# Patient Record
Sex: FEMALE | Race: OTHER | HISPANIC OR LATINO | Employment: UNEMPLOYED | URBAN - METROPOLITAN AREA
[De-identification: names, ages, dates, MRNs, and addresses within clinical notes are randomized per-mention and may not be internally consistent; named-entity substitution may affect disease eponyms.]

---

## 2024-03-02 ENCOUNTER — HOSPITAL ENCOUNTER (EMERGENCY)
Facility: HOSPITAL | Age: 34
Discharge: HOME | End: 2024-03-02
Attending: EMERGENCY MEDICINE
Payer: MEDICAID

## 2024-03-02 VITALS
BODY MASS INDEX: 26.22 KG/M2 | WEIGHT: 148 LBS | SYSTOLIC BLOOD PRESSURE: 111 MMHG | OXYGEN SATURATION: 98 % | DIASTOLIC BLOOD PRESSURE: 76 MMHG | HEIGHT: 63 IN | TEMPERATURE: 98 F | RESPIRATION RATE: 16 BRPM | HEART RATE: 78 BPM

## 2024-03-02 DIAGNOSIS — Z34.91 FIRST TRIMESTER PREGNANCY: ICD-10-CM

## 2024-03-02 DIAGNOSIS — B96.89 BACTERIAL VAGINOSIS: Primary | ICD-10-CM

## 2024-03-02 DIAGNOSIS — N76.0 BACTERIAL VAGINOSIS: Primary | ICD-10-CM

## 2024-03-02 DIAGNOSIS — R10.2 PELVIC PAIN: ICD-10-CM

## 2024-03-02 LAB
BACTERIA #/AREA URNS AUTO: NORMAL /HPF
BACTERIA GENITAL QL WET PREP: ABNORMAL
BILIRUB UR QL STRIP: NEGATIVE
CLARITY UR REFRACT.AUTO: CLEAR
CLUE CELLS VAG QL WET PREP: ABNORMAL
COLOR UR AUTO: YELLOW
FILAMENT FUNGI VAG WET PREP-#/AREA: ABNORMAL
GLUCOSE UR QL STRIP: ABNORMAL
HGB UR QL STRIP: NEGATIVE
KETONES UR QL STRIP: ABNORMAL
LEUKOCYTE ESTERASE UR QL STRIP: ABNORMAL
MICROSCOPIC COMMENT: NORMAL
NITRITE UR QL STRIP: NEGATIVE
PH UR STRIP: 7 [PH] (ref 5–8)
PROT UR QL STRIP: NEGATIVE
RBC #/AREA URNS AUTO: 1 /HPF (ref 0–4)
SP GR UR STRIP: >1.03 (ref 1–1.03)
SPECIMEN SOURCE: ABNORMAL
SQUAMOUS #/AREA URNS AUTO: 3 /HPF
T VAGINALIS GENITAL QL WET PREP: ABNORMAL
URN SPEC COLLECT METH UR: ABNORMAL
WBC #/AREA URNS AUTO: 1 /HPF (ref 0–5)
WBC #/AREA VAG WET PREP: ABNORMAL
YEAST GENITAL QL WET PREP: ABNORMAL
YEAST UR QL AUTO: NORMAL

## 2024-03-02 PROCEDURE — 87491 CHLMYD TRACH DNA AMP PROBE: CPT

## 2024-03-02 PROCEDURE — 87210 SMEAR WET MOUNT SALINE/INK: CPT

## 2024-03-02 PROCEDURE — 81001 URINALYSIS AUTO W/SCOPE: CPT

## 2024-03-02 PROCEDURE — 99284 EMERGENCY DEPT VISIT MOD MDM: CPT

## 2024-03-02 RX ORDER — METRONIDAZOLE 7.5 MG/G
1 GEL VAGINAL 2 TIMES DAILY
Qty: 70 G | Refills: 0 | Status: SHIPPED | OUTPATIENT
Start: 2024-03-02 | End: 2024-03-07

## 2024-03-03 NOTE — ED PROVIDER NOTES
Encounter Date: 3/2/2024       History     Chief Complaint   Patient presents with    Vaginal Discharge     6 weeks preg, lower abd pain yellow discharge, pain with urination, [facility]d [facility]     33-year-old female  Monday 6wks gestation presents to the emergency department due to lower abdominal discomfort over the last few days.  She was previously evaluated for same presentation last week a [facility].  She had a ultrasound with a confirmed IUP.  Patient was presenting today however because the discomfort has continued it was also experiencing white vaginal discharge Denies history of STIs.  She was in a monogamous relationship No vaginal bleeding.  No changes in bowel movements.  No fever nausea vomiting.      Review of patient's allergies indicates:  No Known Allergies  Past Medical History:   Diagnosis Date    Diabetes mellitus      History reviewed. No pertinent surgical history.  History reviewed. No pertinent family history.  Social History     Tobacco Use    Smoking status: Unknown   Substance Use Topics    Drug use: Never     Review of Systems  See HPI  Physical Exam     Initial Vitals [24 1849]   BP Pulse Resp Temp SpO2   (!) 182/91 93 18 98.2 °F (36.8 °C) 100 %      MAP       --         Physical Exam    Vitals reviewed.  Constitutional: She appears well-developed and well-nourished.   Patient was well-appearing comfortable on exam   HENT:   Head: Normocephalic and atraumatic.   Eyes: Conjunctivae and EOM are normal.   Neck:   Normal range of motion.  Cardiovascular:  Normal rate.           Pulmonary/Chest: No respiratory distress.   Abdominal: Abdomen is soft. She exhibits no distension. There is no abdominal tenderness. There is no rebound.   Genitourinary:    Genitourinary Comments: Chaperoned   Exam performed no external lesions or rashes  No vaginal bleeding, there was copious vaginal white discharge present  No significant erythema of the vaginal  canal, cervical os closed, no adnexal tenderness.     Musculoskeletal:         General: Normal range of motion.      Cervical back: Normal range of motion.     Neurological: She is alert and oriented to person, place, and time.   Skin: Skin is warm and dry.   Psychiatric: She has a normal mood and affect. Thought content normal.         ED Course   Procedures  Labs Reviewed   VAGINAL SCREEN - Abnormal; Notable for the following components:       Result Value    Clue Cells Rare (*)     WBC - Vaginal Screen Moderate (*)     Bacteria - Vaginal Screen Many (*)     All other components within normal limits    Narrative:     Release to patient->Immediate   URINALYSIS, REFLEX TO URINE CULTURE - Abnormal; Notable for the following components:    Specific Gravity, UA >1.030 (*)     Glucose, UA 4+ (*)     Ketones, UA Trace (*)     Leukocytes, UA 1+ (*)     All other components within normal limits    Narrative:     Specimen Source->Urine   C. TRACHOMATIS/N. GONORRHOEAE BY AMP DNA   URINALYSIS MICROSCOPIC    Narrative:     Specimen Source->Urine          Imaging Results    None          Medications - No data to display  Medical Decision Making  33-year-old female presenting to emergency department for evaluation of lower abdominal pain and vaginal discharge.  She was a confirmed IUP no vaginal bleeding.  I performed a pelvic exam to rule out G/C bacterial vaginosis and Trichomonas.  Along with vaginal candidiasis.  Also ordered a UA.  No repeat imaging needed today as IUP has been confirmed at [facility] and patient was not experiencing any vaginal bleeding.  Labwork was remarkable for positive bacterial vaginosis prescribed topical metronidazole.  I have also placed a referral to obstetrician so patient can options establish care for this pregnancy.  In the presence of abdominal discomfort in the 1st trimester I explained the signs of miscarriage.  Discussed return precautions she verbalized understanding with plan discharged  home    Amount and/or Complexity of Data Reviewed  Labs: ordered.    Risk  Prescription drug management.                                      Clinical Impression:  Final diagnoses:  [N76.0, B96.89] Bacterial vaginosis (Primary)  [R10.2] Pelvic pain  [Z34.91] First trimester pregnancy          ED Disposition Condition    Discharge Stable          ED Prescriptions       Medication Sig Dispense Start Date End Date Auth. Provider    metroNIDAZOLE (METROGEL) 0.75 % (37.5mg/5 gram) vaginal gel Place 1 applicator vaginally 2 (two) times daily. for 5 days 70 g 3/2/2024 3/7/2024 Blanka Dumont PA-C          Follow-up Information       Follow up With Specialties Details Why Contact Info Additional Information    [facility] Obstetrics and Gynecology Schedule an appointment as soon as possible for a visit   [address]  [facility] [address] [phone] OB GYN - [facility], 5th Floor Please park in [address] and use [place] elevators    [facility] Obstetrics and Gynecology Schedule an appointment as soon as possible for a visit   [address] [address] [phone] Please park in [address] or [address] and use [address]ie [address]. Take Medical Office Bldg. elevators.             Blanka Dumont PA-C  03/04/24 [id number]

## 2024-03-03 NOTE — DISCHARGE INSTRUCTIONS
Call the clinic listed above to establish care with an obstetrician for appropriate labs and imaging during pregnancy.  They can also help manage her medications during pregnancy.  You can take Tylenol or acetaminophen for pain.  Avoid ibuprofen or anti-inflammatories    Your labwork showed bacteria vaginosis.  This has not a sexually transmitted infection but it can cause a thick white discharge.  I have prescribed you a medicine that you will put in the vaginal canal to help clear it.

## 2024-03-03 NOTE — ED NOTES
Sri Cassandra Leslye Farrell, an 33 y.o. female presents to the ED   Abdominal pain, fever and vaginal discharge for 2 days    Chief Complaint   Patient presents with    Vaginal Discharge     6 weeks preg, lower abd pain yellow discharge, pain with urination, 8d ago had ultrasound at [facility]     Review of patient's allergies indicates:  Not on File  No past medical history on file.        LOC: The patient is awake, alert, aware of environment with an appropriate affect. Oriented x4, speaking appropriately  APPEARANCE: Pt resting comfortably, in no acute distress, pt is clean and well groomed, clothing properly fastened  SKIN:The skin is warm and dry, color consistent with ethnicity, patient has normal skin turgor and moist mucus membranes  RESPIRATORY:Airway is open and patent, respirations are spontaneous, patient has a normal effort and rate, no accessory muscle use noted.  CARDIAC: Normal rate and rhythm, no peripheral edema noted, capillary refill < 3 seconds, bilateral radial pulses 2+.  ABDOMEN: +abdominal pain 6 weeks pregnant  NEUROLOGIC: PERRLA, facial expression is symmetrical, patient moving all extremities spontaneously, normal sensation in all extremities when touched with a finger.  Follows all commands appropriately  MUSCULOSKELETAL: Patient moving all extremities spontaneously, no obvious swelling or deformities noted.

## 2024-03-05 LAB
C TRACH DNA SPEC QL NAA+PROBE: NOT DETECTED
N GONORRHOEA DNA SPEC QL NAA+PROBE: NOT DETECTED

## 2024-03-07 ENCOUNTER — INITIAL PRENATAL (OUTPATIENT)
Dept: OBSTETRICS AND GYNECOLOGY | Facility: CLINIC | Age: 34
End: 2024-03-07
Payer: MEDICAID

## 2024-03-07 VITALS
BODY MASS INDEX: 26.99 KG/M2 | WEIGHT: 152.31 LBS | DIASTOLIC BLOOD PRESSURE: 65 MMHG | SYSTOLIC BLOOD PRESSURE: 110 MMHG

## 2024-03-07 DIAGNOSIS — O24.111 PREGNANCY WITH TYPE 2 DIABETES MELLITUS IN FIRST TRIMESTER: ICD-10-CM

## 2024-03-07 DIAGNOSIS — R10.2 PELVIC PAIN: ICD-10-CM

## 2024-03-07 DIAGNOSIS — Z34.91 FIRST TRIMESTER PREGNANCY: ICD-10-CM

## 2024-03-07 DIAGNOSIS — O24.111 TYPE 2 DIABETES MELLITUS AFFECTING PREGNANCY IN FIRST TRIMESTER, ANTEPARTUM: ICD-10-CM

## 2024-03-07 DIAGNOSIS — K59.00 CONSTIPATION, UNSPECIFIED CONSTIPATION TYPE: Primary | ICD-10-CM

## 2024-03-07 PROCEDURE — 81514 NFCT DS BV&VAGINITIS DNA ALG: CPT | Performed by: STUDENT IN AN ORGANIZED HEALTH CARE EDUCATION/TRAINING PROGRAM

## 2024-03-07 PROCEDURE — 99214 OFFICE O/P EST MOD 30 MIN: CPT | Mod: PBBFAC,TH | Performed by: STUDENT IN AN ORGANIZED HEALTH CARE EDUCATION/TRAINING PROGRAM

## 2024-03-07 PROCEDURE — 99204 OFFICE O/P NEW MOD 45 MIN: CPT | Mod: TH,S$PBB,, | Performed by: STUDENT IN AN ORGANIZED HEALTH CARE EDUCATION/TRAINING PROGRAM

## 2024-03-07 PROCEDURE — 87624 HPV HI-RISK TYP POOLED RSLT: CPT | Performed by: STUDENT IN AN ORGANIZED HEALTH CARE EDUCATION/TRAINING PROGRAM

## 2024-03-07 PROCEDURE — 99999 PR PBB SHADOW E&M-EST. PATIENT-LVL IV: CPT | Mod: PBBFAC,,, | Performed by: STUDENT IN AN ORGANIZED HEALTH CARE EDUCATION/TRAINING PROGRAM

## 2024-03-07 PROCEDURE — 87086 URINE CULTURE/COLONY COUNT: CPT | Performed by: STUDENT IN AN ORGANIZED HEALTH CARE EDUCATION/TRAINING PROGRAM

## 2024-03-07 PROCEDURE — 88175 CYTOPATH C/V AUTO FLUID REDO: CPT | Performed by: STUDENT IN AN ORGANIZED HEALTH CARE EDUCATION/TRAINING PROGRAM

## 2024-03-07 PROCEDURE — 87491 CHLMYD TRACH DNA AMP PROBE: CPT | Performed by: STUDENT IN AN ORGANIZED HEALTH CARE EDUCATION/TRAINING PROGRAM

## 2024-03-07 RX ORDER — METFORMIN HYDROCHLORIDE 500 MG/1
500 TABLET ORAL
Qty: 30 TABLET | Refills: 0 | Status: SHIPPED | OUTPATIENT
Start: 2024-03-07 | End: 2024-03-25 | Stop reason: ALTCHOICE

## 2024-03-07 RX ORDER — SENNOSIDES 8.6 MG/1
1 TABLET ORAL DAILY
Qty: 30 TABLET | Refills: 1 | Status: SHIPPED | OUTPATIENT
Start: 2024-03-07

## 2024-03-07 NOTE — PROGRESS NOTES
34 yo  at 8w1d by approximate LMP presents for IOB    Had an ED visit on  that showed a GS and YS but no comment on fetal pole    Previously lived in [place] and took metformin (850mg BID) and glibenclamide (glynase), has been prescribed insulin since moving to  but day before yesterday has run out.      fasting today, reports using 44 units of insulin but does not remember which type.  A1c obtained at ED visit 12.2 on 24      Vitals  BP: 110/65  Weight: 69.1 kg (152 lb 5.4 oz)     Assessment & Plan    - Discussed importance of good glycemic control in pregnancy and likelihood of needing to resume insulin. Pt could not remember type of insulin but says she took it at night  - Plan to re-start metformin at 500 daily and increase as tolerated  - Information for obtaining diabetic testing supplies at [facility] drugs provided to pt, instructed on way to track BG 4x daily  - Will place MFM consultation for pre-existing DM in pregnancy after dating US performed  - Continue PNV  - Discussed that only GS and YS seen on US, will need to repeat to obtain to confirm pregnancy and LETI  - RTC in about 2 weeks     Problem Noted   Type 2 Diabetes Mellitus Affecting Pregnancy in First Trimester, Antepartum 3/8/2024    [facility] checklist for antepartum care of pregestational DM  First Trimester  [ ] Record onset age of DM  [ ] History of DM complications  - ketoacidosis, hypoglycemia, gastroparesis, nephropathy, retinopathy, HTN, CAD  [ ] PE of lower extremities  [ ] referral for retinal exam  [ ] Pneumococcal vaccine  [ ] Lab tests  - A1c   - CMP  - TSH  - P/Cr   - EKG if >36 yo or cardiac risk factors present    Second trimester  [ ] ASA-81 between 12 and 28 weeks  [ ] Detailed anatomy US  [ ] Fetal echo    Third trimester  [ ] Antepartum surveillance at 32-34 weeks     Delivery planning  [ ] US for fetal growth (consider CB if EFW >4500g)  [ ] Delivery timing  - Good glycemic control: 39 0/7 to 39 6/7  - Poor glycemic  control: 36 0/7 to 38 6/7  - Complications such as FGR, Pre-E: individualized

## 2024-03-08 PROBLEM — O24.111 TYPE 2 DIABETES MELLITUS AFFECTING PREGNANCY IN FIRST TRIMESTER, ANTEPARTUM: Status: ACTIVE | Noted: 2024-03-08

## 2024-03-09 LAB
BACTERIA UR CULT: NORMAL
C TRACH DNA SPEC QL NAA+PROBE: NOT DETECTED
N GONORRHOEA DNA SPEC QL NAA+PROBE: NOT DETECTED

## 2024-03-11 ENCOUNTER — PROCEDURE VISIT (OUTPATIENT)
Dept: MATERNAL FETAL MEDICINE | Facility: CLINIC | Age: 34
End: 2024-03-11
Payer: MEDICAID

## 2024-03-11 DIAGNOSIS — Z36.89 ENCOUNTER FOR FETAL ANATOMIC SURVEY: Primary | ICD-10-CM

## 2024-03-11 DIAGNOSIS — Z34.91 FIRST TRIMESTER PREGNANCY: ICD-10-CM

## 2024-03-11 LAB
BACTERIAL VAGINOSIS DNA: NEGATIVE
CANDIDA GLABRATA DNA: NEGATIVE
CANDIDA KRUSEI DNA: NEGATIVE
CANDIDA RRNA VAG QL PROBE: POSITIVE
T VAGINALIS RRNA GENITAL QL PROBE: NEGATIVE

## 2024-03-11 PROCEDURE — 76801 OB US < 14 WKS SINGLE FETUS: CPT | Mod: PBBFAC | Performed by: OBSTETRICS & GYNECOLOGY

## 2024-03-12 ENCOUNTER — TELEPHONE (OUTPATIENT)
Dept: MATERNAL FETAL MEDICINE | Facility: CLINIC | Age: 34
End: 2024-03-12
Payer: MEDICAID

## 2024-03-12 ENCOUNTER — LAB VISIT (OUTPATIENT)
Dept: LAB | Facility: HOSPITAL | Age: 34
End: 2024-03-12
Payer: MEDICAID

## 2024-03-12 ENCOUNTER — TELEPHONE (OUTPATIENT)
Dept: OBSTETRICS AND GYNECOLOGY | Facility: CLINIC | Age: 34
End: 2024-03-12
Payer: MEDICAID

## 2024-03-12 DIAGNOSIS — O24.111 PREGNANCY WITH TYPE 2 DIABETES MELLITUS IN FIRST TRIMESTER: ICD-10-CM

## 2024-03-12 DIAGNOSIS — O24.111 TYPE 2 DIABETES MELLITUS AFFECTING PREGNANCY IN FIRST TRIMESTER, ANTEPARTUM: Primary | ICD-10-CM

## 2024-03-12 DIAGNOSIS — Z34.91 FIRST TRIMESTER PREGNANCY: ICD-10-CM

## 2024-03-12 DIAGNOSIS — B37.31 VAGINAL CANDIDIASIS: Primary | ICD-10-CM

## 2024-03-12 DIAGNOSIS — Z34.91 FETAL HEART TONES PRESENT, FIRST TRIMESTER: Primary | ICD-10-CM

## 2024-03-12 LAB
ABO + RH BLD: NORMAL
ALBUMIN SERPL BCP-MCNC: 3.7 G/DL (ref 3.5–5.2)
ALP SERPL-CCNC: 95 U/L (ref 55–135)
ALT SERPL W/O P-5'-P-CCNC: 19 U/L (ref 10–44)
ANION GAP SERPL CALC-SCNC: 6 MMOL/L (ref 8–16)
AST SERPL-CCNC: 14 U/L (ref 10–40)
BASOPHILS # BLD AUTO: 0.04 K/UL (ref 0–0.2)
BASOPHILS NFR BLD: 0.4 % (ref 0–1.9)
BILIRUB SERPL-MCNC: 0.3 MG/DL (ref 0.1–1)
BLD GP AB SCN CELLS X3 SERPL QL: NORMAL
BUN SERPL-MCNC: 11 MG/DL (ref 6–20)
CALCIUM SERPL-MCNC: 9.4 MG/DL (ref 8.7–10.5)
CHLORIDE SERPL-SCNC: 105 MMOL/L (ref 95–110)
CO2 SERPL-SCNC: 23 MMOL/L (ref 23–29)
CREAT SERPL-MCNC: 0.7 MG/DL (ref 0.5–1.4)
DIFFERENTIAL METHOD BLD: ABNORMAL
EOSINOPHIL # BLD AUTO: 0.1 K/UL (ref 0–0.5)
EOSINOPHIL NFR BLD: 1.2 % (ref 0–8)
ERYTHROCYTE [DISTWIDTH] IN BLOOD BY AUTOMATED COUNT: 14.2 % (ref 11.5–14.5)
EST. GFR  (NO RACE VARIABLE): >60 ML/MIN/1.73 M^2
GLUCOSE SERPL-MCNC: 247 MG/DL (ref 70–110)
HBV SURFACE AG SERPL QL IA: NORMAL
HCT VFR BLD AUTO: 36.5 % (ref 37–48.5)
HCV AB SERPL QL IA: NORMAL
HGB BLD-MCNC: 12 G/DL (ref 12–16)
HIV 1+2 AB+HIV1 P24 AG SERPL QL IA: NORMAL
IMM GRANULOCYTES # BLD AUTO: 0.06 K/UL (ref 0–0.04)
IMM GRANULOCYTES NFR BLD AUTO: 0.6 % (ref 0–0.5)
LYMPHOCYTES # BLD AUTO: 2.4 K/UL (ref 1–4.8)
LYMPHOCYTES NFR BLD: 22.9 % (ref 18–48)
MCH RBC QN AUTO: 27.2 PG (ref 27–31)
MCHC RBC AUTO-ENTMCNC: 32.9 G/DL (ref 32–36)
MCV RBC AUTO: 83 FL (ref 82–98)
MONOCYTES # BLD AUTO: 0.4 K/UL (ref 0.3–1)
MONOCYTES NFR BLD: 4.2 % (ref 4–15)
NEUTROPHILS # BLD AUTO: 7.4 K/UL (ref 1.8–7.7)
NEUTROPHILS NFR BLD: 70.7 % (ref 38–73)
NRBC BLD-RTO: 0 /100 WBC
PLATELET # BLD AUTO: 358 K/UL (ref 150–450)
PMV BLD AUTO: 10.8 FL (ref 9.2–12.9)
POTASSIUM SERPL-SCNC: 3.8 MMOL/L (ref 3.5–5.1)
PROT SERPL-MCNC: 7.6 G/DL (ref 6–8.4)
RBC # BLD AUTO: 4.41 M/UL (ref 4–5.4)
SODIUM SERPL-SCNC: 134 MMOL/L (ref 136–145)
SPECIMEN OUTDATE: NORMAL
WBC # BLD AUTO: 10.45 K/UL (ref 3.9–12.7)

## 2024-03-12 PROCEDURE — 87340 HEPATITIS B SURFACE AG IA: CPT | Performed by: STUDENT IN AN ORGANIZED HEALTH CARE EDUCATION/TRAINING PROGRAM

## 2024-03-12 PROCEDURE — 86803 HEPATITIS C AB TEST: CPT | Performed by: STUDENT IN AN ORGANIZED HEALTH CARE EDUCATION/TRAINING PROGRAM

## 2024-03-12 PROCEDURE — 83020 HEMOGLOBIN ELECTROPHORESIS: CPT | Performed by: STUDENT IN AN ORGANIZED HEALTH CARE EDUCATION/TRAINING PROGRAM

## 2024-03-12 PROCEDURE — 85025 COMPLETE CBC W/AUTO DIFF WBC: CPT | Performed by: STUDENT IN AN ORGANIZED HEALTH CARE EDUCATION/TRAINING PROGRAM

## 2024-03-12 PROCEDURE — 80053 COMPREHEN METABOLIC PANEL: CPT | Performed by: STUDENT IN AN ORGANIZED HEALTH CARE EDUCATION/TRAINING PROGRAM

## 2024-03-12 PROCEDURE — 36415 COLL VENOUS BLD VENIPUNCTURE: CPT | Performed by: STUDENT IN AN ORGANIZED HEALTH CARE EDUCATION/TRAINING PROGRAM

## 2024-03-12 PROCEDURE — 86592 SYPHILIS TEST NON-TREP QUAL: CPT | Performed by: STUDENT IN AN ORGANIZED HEALTH CARE EDUCATION/TRAINING PROGRAM

## 2024-03-12 PROCEDURE — 86901 BLOOD TYPING SEROLOGIC RH(D): CPT | Performed by: STUDENT IN AN ORGANIZED HEALTH CARE EDUCATION/TRAINING PROGRAM

## 2024-03-12 PROCEDURE — 87389 HIV-1 AG W/HIV-1&-2 AB AG IA: CPT | Performed by: STUDENT IN AN ORGANIZED HEALTH CARE EDUCATION/TRAINING PROGRAM

## 2024-03-12 PROCEDURE — 86762 RUBELLA ANTIBODY: CPT | Performed by: STUDENT IN AN ORGANIZED HEALTH CARE EDUCATION/TRAINING PROGRAM

## 2024-03-12 RX ORDER — ASPIRIN 325 MG
1 TABLET, DELAYED RELEASE (ENTERIC COATED) ORAL NIGHTLY
Qty: 45 G | Refills: 1 | Status: SHIPPED | OUTPATIENT
Start: 2024-03-12 | End: 2024-03-19

## 2024-03-12 NOTE — TELEPHONE ENCOUNTER
Called and spoke to patient using an [facility] . Relayed message from provider. Pt RADHA Porter MA  [phone]    ----- Message from Red Cid III, MD sent at 3/12/2024 10:33 AM CDT -----  Please let Sri know I sent a prescription in for a yeast infection, she is to use it nightly for 7 nights in the vagina.   Also I have placed a referral to maternal fetal medicine to make a plan for diabetes management and to expect to be contacted for this

## 2024-03-12 NOTE — TELEPHONE ENCOUNTER
Call to patient with [facility]  number [phone]. Called to schedule her Maternal Fetal Medicine consult. Asked patient if she was recording her blood sugars and she stated yes. Asked if any were over 200 on her recordings and she stated no. The highest was 180. She stated she was taking Metformin 500mg with breakfast. Scheduled patient for first available appointment with office. She was instructed to bring her blood sugar logs with her to the appointment. She stated understanding.

## 2024-03-13 LAB
RPR SER QL: NORMAL
RUBV IGG SER-ACNC: 24.6 IU/ML
RUBV IGG SER-IMP: REACTIVE

## 2024-03-15 LAB
HGB A2 MFR BLD HPLC: 2.6 % (ref 2.2–3.2)
HGB FRACT BLD ELPH-IMP: NORMAL
HGB FRACT BLD ELPH-IMP: NORMAL

## 2024-03-18 ENCOUNTER — PROCEDURE VISIT (OUTPATIENT)
Dept: MATERNAL FETAL MEDICINE | Facility: CLINIC | Age: 34
End: 2024-03-18
Payer: MEDICAID

## 2024-03-18 ENCOUNTER — OFFICE VISIT (OUTPATIENT)
Dept: MATERNAL FETAL MEDICINE | Facility: CLINIC | Age: 34
End: 2024-03-18
Payer: MEDICAID

## 2024-03-18 VITALS
SYSTOLIC BLOOD PRESSURE: 110 MMHG | WEIGHT: 152.75 LBS | BODY MASS INDEX: 27.07 KG/M2 | HEIGHT: 63 IN | DIASTOLIC BLOOD PRESSURE: 62 MMHG

## 2024-03-18 DIAGNOSIS — O24.111 TYPE 2 DIABETES MELLITUS AFFECTING PREGNANCY IN FIRST TRIMESTER, ANTEPARTUM: ICD-10-CM

## 2024-03-18 DIAGNOSIS — O24.111 TYPE 2 DIABETES MELLITUS AFFECTING PREGNANCY IN FIRST TRIMESTER, ANTEPARTUM: Primary | ICD-10-CM

## 2024-03-18 DIAGNOSIS — Z34.91 FETAL HEART TONES PRESENT, FIRST TRIMESTER: ICD-10-CM

## 2024-03-18 DIAGNOSIS — Z36.82 ENCOUNTER FOR ANTENATAL SCREENING FOR NUCHAL TRANSLUCENCY: Primary | ICD-10-CM

## 2024-03-18 LAB
FINAL PATHOLOGIC DIAGNOSIS: NORMAL
Lab: NORMAL

## 2024-03-18 PROCEDURE — 3046F HEMOGLOBIN A1C LEVEL >9.0%: CPT | Mod: CPTII,,, | Performed by: OBSTETRICS & GYNECOLOGY

## 2024-03-18 PROCEDURE — 3074F SYST BP LT 130 MM HG: CPT | Mod: CPTII,,, | Performed by: OBSTETRICS & GYNECOLOGY

## 2024-03-18 PROCEDURE — 3078F DIAST BP <80 MM HG: CPT | Mod: CPTII,,, | Performed by: OBSTETRICS & GYNECOLOGY

## 2024-03-18 PROCEDURE — 99204 OFFICE O/P NEW MOD 45 MIN: CPT | Mod: S$PBB,TH,, | Performed by: OBSTETRICS & GYNECOLOGY

## 2024-03-18 PROCEDURE — 76815 OB US LIMITED FETUS(S): CPT | Mod: PBBFAC | Performed by: OBSTETRICS & GYNECOLOGY

## 2024-03-18 PROCEDURE — 3008F BODY MASS INDEX DOCD: CPT | Mod: CPTII,,, | Performed by: OBSTETRICS & GYNECOLOGY

## 2024-03-18 PROCEDURE — 99213 OFFICE O/P EST LOW 20 MIN: CPT | Mod: PBBFAC,TH,25 | Performed by: OBSTETRICS & GYNECOLOGY

## 2024-03-18 PROCEDURE — 1159F MED LIST DOCD IN RCRD: CPT | Mod: CPTII,,, | Performed by: OBSTETRICS & GYNECOLOGY

## 2024-03-18 PROCEDURE — 99999 PR PBB SHADOW E&M-EST. PATIENT-LVL III: CPT | Mod: PBBFAC,,, | Performed by: OBSTETRICS & GYNECOLOGY

## 2024-03-18 PROCEDURE — 76815 OB US LIMITED FETUS(S): CPT | Mod: 26,S$PBB,, | Performed by: OBSTETRICS & GYNECOLOGY

## 2024-03-18 RX ORDER — INSULIN ASPART 100 [IU]/ML
5 INJECTION, SOLUTION INTRAVENOUS; SUBCUTANEOUS
Qty: 9 ML | Refills: 4 | Status: SHIPPED | OUTPATIENT
Start: 2024-03-18 | End: 2024-03-25

## 2024-03-18 RX ORDER — LANCETS 33 GAUGE
EACH MISCELLANEOUS
COMMUNITY
Start: 2024-03-07 | End: 2024-03-25 | Stop reason: SDUPTHER

## 2024-03-18 RX ORDER — CALCIUM CITRATE/VITAMIN D3 200MG-6.25
TABLET ORAL
COMMUNITY
Start: 2024-03-07 | End: 2024-03-25 | Stop reason: SDUPTHER

## 2024-03-18 RX ORDER — PEN NEEDLE, DIABETIC 30 GX3/16"
1 NEEDLE, DISPOSABLE MISCELLANEOUS 4 TIMES DAILY
Qty: 200 EACH | Refills: 3 | Status: SHIPPED | OUTPATIENT
Start: 2024-03-18 | End: 2024-06-08 | Stop reason: SDUPTHER

## 2024-03-18 RX ORDER — BLOOD-GLUCOSE METER
EACH MISCELLANEOUS
COMMUNITY
Start: 2024-03-07

## 2024-03-18 RX ORDER — FOLIC ACID 0.4 MG
400 TABLET ORAL DAILY
COMMUNITY

## 2024-03-18 RX ORDER — INSULIN DETEMIR 100 [IU]/ML
20 INJECTION, SOLUTION SUBCUTANEOUS NIGHTLY
Qty: 9 ML | Refills: 4 | Status: SHIPPED | OUTPATIENT
Start: 2024-03-18 | End: 2024-03-25

## 2024-03-18 NOTE — PROGRESS NOTES
"MATERNAL-FETAL MEDICINE   CONSULT NOTE    Provider requesting consultation: [facility]    Visit was conducted in the patient's native language - Turkmen    SUBJECTIVE:     Ms. Sri Gomez is a 33 y.o.  female with IUP at 8w6d who is seen in consultation by MFM for evaluation and management of:  Problem   Type 2 Diabetes Mellitus Affecting Pregnancy in First Trimester, Antepartum     Patient has no complaints today. She is overall feeling well.  Patient denies any contractions/cramping, vaginal bleeding or leakage of fluid.       Medication List with Changes/Refills   Current Medications    METFORMIN (GLUCOPHAGE) 500 MG TABLET    Take 1 tablet (500 mg total) by mouth daily with breakfast.    TRUE METRIX GLUCOSE METER MISC    use TO test blood glucose FOUR TIMES DAILY    TRUE METRIX GLUCOSE TEST STRIP STRP    use TO test blood glucose FOUR TIMES DAILY    TRUEPLUS LANCETS 33 GAUGE MISC    use TO test blood glucose FOUR TIMES DAILY   PNV daily    Review of patient's allergies indicates:  No Known Allergies    PMH:  Past Medical History:   Diagnosis Date    Diabetes mellitus      PObHx:  OB History    Para Term  AB Living   1             SAB IAB Ectopic Multiple Live Births                  # Outcome Date GA Lbr Juan/2nd Weight Sex Delivery Anes PTL Lv   1 Current              PSH:No past surgical history on file.    Family history:family history is not on file.    Social history: reports that she has never smoked. She has never used smokeless tobacco. She reports that she does not currently use alcohol. She reports that she does not use drugs.    Genetic history: The patient denies any inherited genetic diseases or birth defects in herself or her partner's personal history or family.    Objective:   /62 (BP Location: Left arm)   Ht 5' 2.9" (1.598 m)   Wt 69.3 kg (152 lb 12.5 oz)   LMP 01/10/2024 (Approximate)   BMI 27.15 kg/m²     Physical Exam  deferred    Ultrasound performed. See " "viewpoint for full ultrasound report.  Severino live IUP    Significant labs/imaging:  Lab Results   Component Value Date    HGBA1C 12.2 (H) 2024     Lab Results   Component Value Date    WBC 10.45 2024    HGB 12.0 2024    HCT 36.5 (L) 2024    MCV 83 2024     2024       Lab Results   Component Value Date    ALT 19 2024    AST 14 2024    ALKPHOS 95 2024    BILITOT 0.3 2024     Lab Results   Component Value Date    CREATININE 0.7 2024         ASSESSMENT/PLAN:     33 y.o.  female with IUP at 8w6d     Type 2 diabetes mellitus affecting pregnancy in first trimester, antepartum  The patient reports diagnosis of T2DM in [place] 9 years prior.  She was initially on Metfomin/Glyburide and then switched to Lantus once she immigrated to the  2 years prior. She also reports having a hx of hyperthyroidism, but has been off of medications for "years"  She report being on Lantus 44u just prior to pregnancy, but has not been on any medications since about 3 weeks prior to conception. She was recently started on Metformin 500mg daily, which was increased to BID a few days prior    The patient was counseled regarding the risks of diabetes in pregnancy. These risks include but are not limited to an increased risk of , preeclampsia/gestational hypertension, fetal structural anomalies, macrosomia, prematurity, shoulder dystocia/birth injury,  hyperbilirubinemia and electrolyte issues, pulmonary immaturity and sudden stillbirth especially in those patients with poor glucose control. I also discussed with the patient the need for increased fetal surveillance with serial fetal growth assessments and third trimester fetal testing. I also reviewed the possibility of need for early delivery in those with poor glucose control or evidence of fetal growth abnormalities.    Recommendations (Please refer to [facility] guidelines):  Baseline evaluation (to " be ordered by primary OB provider):  24 hour urine protein or urine P/C ratio  Maternal EKG; echocardiogram if EKG is abnormal  Maternal ophthalmic exam (if hasn't been performed in last year) and podiatry exam  TSH - Especially in setting of report hx of thyroid disease  Hemoglobin A1C every trimester  Diabetic education referral and counseling re: goal blood sugars (< 95 mg/dl for fasting, < 120 mg/dl for 2 hour postprandial) - order placed for this  Start/Continue to check blood sugars 4x daily  Fasting and 2-hour postprandial glucose monitoring.   Goals of fasting levels below 95 mg/dL and postprandial levels below 120 mg/dL.   Patient will send BS log weekly through portal on those weeks she is not seen in clinic  Medications:   Start low dose aspirin 81 mg daily at 12-16 weeks for preeclampsia risk reduction  1 mg folic acid daily  Insulin - see below  Multiple dose injectable insulin  Patient's current regimen is:  Metformin 500 BID  After consultation, the following adjustments were made:  Levemir 20u Qhs  Aspart 5/5/5 WM  Stop Metformin  Ultrasounds:  Nuchal translucency scan at 12-13 weeks - ordered  Targeted anatomy survey at 20 weeks  Serial growth ultrasounds q 4-6 weeks at 26-28 weeks  A fetal echocardiogram is recommended at 22-24 weeks with pediatric cardiology - ordered  Prenatal testing  Twice weekly BPP/ NST + AFV starting at 32 weeks. (Should be ordered and arranged by the patient's primary OB provider).    Delivery timin 0/7 to 38 and 6/7 weeks if under good control without comorbidities  37 0/7 to 37 and 6/7 weeks if longstanding diabetes or poorly controlled, polyhydramnios, EFW>90th percentile, or BMI >= 40  36 0/7 to 37 and 6/7 weeks if vascular complications, prior stillbirth or other complicating conditions.  Recommend consideration of earlier delivery if IUGR, HTN, or other complications  Recommend offering  for delivery is EFW is 4500g or more near the time of  "delivery    Insulin management during labor and delivery  Give usual dose of intermediate acting (NPH) or long-acting insulin at bedtime  Hold morning dose of insulin or reduce to ½ dose   Patients who require insulin should be scheduled as the first available (7 am or 7:30 am)  given the need for NPO status  Check glucose every 2-4 hours in latent labor; hourly in active labor  If blood glucose is less than 70 mg/dl, change IVF to D5 ½ NS at rate of 100-150 cc/hr and monitor blood glucose hourly   IV infusion of regular insulin is recommended if glucose levels exceed 120 mg/dl  Postpartum management  Insulin should be reduced by 1/2 of the pre-delivery dose within the first 6-24 hours following delivery.  Patients who were well-controlled on oral regimens can often return to these following delivery.  Patients who are breastfeeding should be advised to have small snacks before breastfeeding to reduce the risk of hypoglycemia.  Patients should be referred to primary MD or Endocrinology for postpartum management.    The patient was advised to call for blood sugars less than 70 or greater than 200 prior to her next appointment. She was also advised that if she notes nausea/vomiting, inability to tolerate PO, or concerns about being able to take her insulin that she should contact her provider or present to the OB ED.    Strict hypoglycemic precautions were reviewed, including symptoms that may be associated to hypoglycemia (glucose less than 70).  Steps reviewed if hypoglycemia occurs: Have help available/calling for help, follow the "15-15 rule": Take 15 grams of carbohydrate (see examples below) to raise your blood sugar and recheck it after 15 minutes. If still below 70 mg/dL, take another 15 gram serving of carbohydrate.  Repeat these steps until your blood sugar is at least 70 mg/dL. Once your blood sugar is back to normal, eat a small serving of protein to make sure it doesnt lower again. Call 911 if " persistent hypoglycemia or LOC.  Examples of 15g of carbohydrate:  *4 ounces (1/2 cup) of juice or regular soda (not diet)  *1 tablespoon of sugar, honey, or corn syrup  *Hard candies, jellybeans, or gumdrops--see food label for how many to consume  When treating a low blood glucose, the choice of carbohydrate source is important. Complex carbohydrates or foods that contain fats along with carbs (like chocolate) can slow the absorption of glucose and should not be used to treat an emergency low.  Make a note about any episodes of low blood sugar and talk with your provider if they are recurrent       FOLLOW UP:   A follow up ultrasound will be made for 4 weeks from today. A follow up MFM MD visit will be made for virtual BS check in 1 week.       The patient was given an opportunity to ask questions about the management of her high risk pregnancy problems. She expressed an understanding of and agreement to the above impression and plan. All questions were answered to her satisfaction.    55 minutes of total time spent on the encounter, which includes face to face time and non-face to face time preparing to see the patient (eg, review of tests), obtaining and/or reviewing separately obtained history, documenting clinical information in the electronic or other health record, independently interpreting results (not separately reported) and communicating results to the patient/family/caregiver, or care coordination (not separately reported).        Robinson Joiner MD   Maternal-Fetal Medicine      Electronically Signed by Robinson Joiner March 18, 2024

## 2024-03-18 NOTE — ASSESSMENT & PLAN NOTE
"The patient reports diagnosis of T2DM in [place] 9 years prior.  She was initially on Metfomin/Glyburide and then switched to Lantus once [place] immig[place] to [place] 2 years prior. She also reports having a hx of hyperthyroidism, but has been off of medications for "years"  She report being on Lantus 44u just prior to pregnancy, but has not been on any medications since about 3 weeks prior to conception. She was recently started on Metformin 500mg daily, which was increased to BID a few days prior    The patient was counseled regarding the risks of diabetes in pregnancy. These risks include but are not limited to an increased risk of , preeclampsia/gestational hypertension, fetal structural anomalies, macrosomia, prematurity, shoulder dystocia/birth injury,  hyperbilirubinemia and electrolyte issues, pulmonary immaturity and sudden stillbirth especially in those patients with poor glucose control. I also discussed with the patient the need for increased fetal surveillance with serial fetal growth assessments and third trimester fetal testing. I also reviewed the possibility of need for early delivery in those with poor glucose control or evidence of fetal growth abnormalities.    Recommendations (Please refer to [facility] guidelines):  Baseline evaluation (to be ordered by primary OB provider):  24 hour urine protein or urine P/C ratio  Maternal EKG; echocardiogram if EKG is abnormal  Maternal ophthalmic exam (if hasn't been performed in last year) and podiatry exam  TSH - Especially in setting of report hx of thyroid disease  Hemoglobin A1C every trimester  Diabetic education referral and counseling re: goal blood sugars (< 95 mg/dl for fasting, < 120 mg/dl for 2 hour postprandial) - order placed for this  Start/Continue to check blood sugars 4x daily  Fasting and 2-hour postprandial glucose monitoring.   Goals of fasting levels below 95 mg/dL and postprandial levels below 120 mg/dL.   Patient will send " BS log weekly through portal on those weeks she is not seen in clinic  Medications:   Start low dose aspirin 81 mg daily at 12-16 weeks for preeclampsia risk reduction  1 mg folic acid daily  Insulin - see below  Multiple dose injectable insulin  Patient's current regimen is:  Metformin 500 BID  After consultation, the following adjustments were made:  Levemir 20u Qhs  Aspart /5/5 WM  Stop Metformin  Ultrasounds:  Nuchal translucency scan at 12-13 weeks - ordered  Targeted anatomy survey at 20 weeks  Serial growth ultrasounds q 4-6 weeks at 26-28 weeks  A fetal echocardiogram is recommended at 22-24 weeks with pediatric cardiology - ordered  Prenatal testing  Twice weekly BPP/ NST + AFV starting at 32 weeks. (Should be ordered and arranged by the patient's primary OB provider).    Delivery timin 0/7 to 38 and 6/7 weeks if under good control without comorbidities  37 0/7 to 37 and 6/7 weeks if longstanding diabetes or poorly controlled, polyhydramnios, EFW>90th percentile, or BMI >= 40  36 0/7 to 37 and 6/7 weeks if vascular complications, prior stillbirth or other complicating conditions.  Recommend consideration of earlier delivery if IUGR, HTN, or other complications  Recommend offering  for delivery is EFW is 4500g or more near the time of delivery    Insulin management during labor and delivery  Give usual dose of intermediate acting (NPH) or long-acting insulin at bedtime  Hold morning dose of insulin or reduce to ½ dose   Patients who require insulin should be scheduled as the first available (7 am or 7:30 am)  given the need for NPO status  Check glucose every 2-4 hours in latent labor; hourly in active labor  If blood glucose is less than 70 mg/dl, change IVF to D5 ½ NS at rate of 100-150 cc/hr and monitor blood glucose hourly   IV infusion of regular insulin is recommended if glucose levels exceed 120 mg/dl  Postpartum management  Insulin should be reduced by 1/2 of the pre-delivery  "dose within the first 6-24 hours following delivery.  Patients who were well-controlled on oral regimens can often return to these following delivery.  Patients who are breastfeeding should be advised to have small snacks before breastfeeding to reduce the risk of hypoglycemia.  Patients should be referred to primary MD or Endocrinology for postpartum management.    The patient was advised to call for blood sugars less than 70 or greater than 200 prior to her next appointment. She was also advised that if she notes nausea/vomiting, inability to tolerate PO, or concerns about being able to take her insulin that she should contact her provider or present to the OB ED.    Strict hypoglycemic precautions were reviewed, including symptoms that may be associated to hypoglycemia (glucose less than 70).  Steps reviewed if hypoglycemia occurs: Have help available/calling for help, follow the "15-15 rule": Take 15 grams of carbohydrate (see examples below) to raise your blood sugar and recheck it after 15 minutes. If still below 70 mg/dL, take another 15 gram serving of carbohydrate.  Repeat these steps until your blood sugar is at least 70 mg/dL. Once your blood sugar is back to normal, eat a small serving of protein to make sure it doesnt lower again. Call 911 if persistent hypoglycemia or LOC.  Examples of 15g of carbohydrate:  *4 ounces (1/2 cup) of juice or regular soda (not diet)  *1 tablespoon of sugar, honey, or corn syrup  *Hard candies, jellybeans, or gumdrops--see food label for how many to consume  When treating a low blood glucose, the choice of carbohydrate source is important. Complex carbohydrates or foods that contain fats along with carbs (like chocolate) can slow the absorption of glucose and should not be used to treat an emergency low.  Make a note about any episodes of low blood sugar and talk with your provider if they are recurrent     "

## 2024-03-19 LAB
HPV HR 12 DNA SPEC QL NAA+PROBE: NEGATIVE
HPV16 AG SPEC QL: NEGATIVE
HPV18 DNA SPEC QL NAA+PROBE: NEGATIVE

## 2024-03-21 ENCOUNTER — ROUTINE PRENATAL (OUTPATIENT)
Dept: OBSTETRICS AND GYNECOLOGY | Facility: CLINIC | Age: 34
End: 2024-03-21
Payer: MEDICAID

## 2024-03-21 VITALS
DIASTOLIC BLOOD PRESSURE: 66 MMHG | BODY MASS INDEX: 27.15 KG/M2 | SYSTOLIC BLOOD PRESSURE: 107 MMHG | WEIGHT: 152.75 LBS

## 2024-03-21 DIAGNOSIS — O09.91 SUPERVISION OF HIGH RISK PREGNANCY IN FIRST TRIMESTER: Primary | ICD-10-CM

## 2024-03-21 DIAGNOSIS — O24.311 PRE-EXISTING DIABETES MELLITUS DURING PREGNANCY IN FIRST TRIMESTER: ICD-10-CM

## 2024-03-21 DIAGNOSIS — Z3A.09 9 WEEKS GESTATION OF PREGNANCY: ICD-10-CM

## 2024-03-21 DIAGNOSIS — K59.00 CONSTIPATION, UNSPECIFIED CONSTIPATION TYPE: ICD-10-CM

## 2024-03-21 PROCEDURE — 99999 PR PBB SHADOW E&M-EST. PATIENT-LVL II: CPT | Mod: PBBFAC,,, | Performed by: STUDENT IN AN ORGANIZED HEALTH CARE EDUCATION/TRAINING PROGRAM

## 2024-03-21 PROCEDURE — 99212 OFFICE O/P EST SF 10 MIN: CPT | Mod: PBBFAC,TH | Performed by: STUDENT IN AN ORGANIZED HEALTH CARE EDUCATION/TRAINING PROGRAM

## 2024-03-21 PROCEDURE — 99214 OFFICE O/P EST MOD 30 MIN: CPT | Mod: TH,S$PBB,, | Performed by: STUDENT IN AN ORGANIZED HEALTH CARE EDUCATION/TRAINING PROGRAM

## 2024-03-21 RX ORDER — POLYETHYLENE GLYCOL 3350 17 G/17G
17 POWDER, FOR SOLUTION ORAL DAILY
Qty: 10 EACH | Refills: 0 | Status: SHIPPED | OUTPATIENT
Start: 2024-03-21

## 2024-03-21 NOTE — PROGRESS NOTES
34 yo  at 9w2d presents for [facility] #[id number]    Reports no issues administering insulin or checking BG. Has had periumbilical pain but is also still constipated.     BG reviewed - since starting insulin 3/18:  F: 126-142 ()  B: 106-119 ()  L: 128-154 (3/3)  D: 128-173 ()     Vitals  BP: 107/66  Weight: 69.3 kg (152 lb 12.5 oz)     Assessment & Plan   - Continue PNV, start ASA-81 at 12 weeks ()  - Continue logging BG, follow up with MFM to titrate insulin  - Miralax as needed for constipation but hold if having watery stools. Counseled on fiber intake   - Rec tylenol for abdominal pain if lingering after BM  - Discussed additional bloodwork (MFM consult note), will place order. Pt cannot have drawn today due to lab being closed.  - Asking about transferring to [place] because it is close to work    Follow up in 2 weeks to check BG log

## 2024-03-25 ENCOUNTER — PATIENT MESSAGE (OUTPATIENT)
Dept: MATERNAL FETAL MEDICINE | Facility: CLINIC | Age: 34
End: 2024-03-25
Payer: MEDICAID

## 2024-03-25 ENCOUNTER — OFFICE VISIT (OUTPATIENT)
Dept: MATERNAL FETAL MEDICINE | Facility: CLINIC | Age: 34
End: 2024-03-25
Payer: MEDICAID

## 2024-03-25 DIAGNOSIS — O24.111 TYPE 2 DIABETES MELLITUS AFFECTING PREGNANCY IN FIRST TRIMESTER, ANTEPARTUM: Primary | ICD-10-CM

## 2024-03-25 PROCEDURE — 99214 OFFICE O/P EST MOD 30 MIN: CPT | Mod: TH,95,, | Performed by: OBSTETRICS & GYNECOLOGY

## 2024-03-25 PROCEDURE — 3046F HEMOGLOBIN A1C LEVEL >9.0%: CPT | Mod: CPTII,95,, | Performed by: OBSTETRICS & GYNECOLOGY

## 2024-03-25 RX ORDER — CALCIUM CITRATE/VITAMIN D3 200MG-6.25
1 TABLET ORAL 4 TIMES DAILY
Qty: 200 EACH | Refills: 2 | Status: SHIPPED | OUTPATIENT
Start: 2024-03-25

## 2024-03-25 RX ORDER — LANCETS 33 GAUGE
1 EACH MISCELLANEOUS 4 TIMES DAILY
Qty: 200 EACH | Refills: 3 | Status: SHIPPED | OUTPATIENT
Start: 2024-03-25

## 2024-03-25 RX ORDER — INSULIN ASPART 100 [IU]/ML
INJECTION, SOLUTION INTRAVENOUS; SUBCUTANEOUS
Qty: 9 ML | Refills: 4 | Status: SHIPPED | OUTPATIENT
Start: 2024-03-25 | End: 2024-04-03

## 2024-03-25 RX ORDER — INSULIN DETEMIR 100 [IU]/ML
26 INJECTION, SOLUTION SUBCUTANEOUS NIGHTLY
Qty: 9 ML | Refills: 4 | Status: SHIPPED | OUTPATIENT
Start: 2024-03-25 | End: 2024-04-03 | Stop reason: ALTCHOICE

## 2024-03-25 NOTE — ASSESSMENT & PLAN NOTE
"The patient reports diagnosis of T2DM in [place] 9 years prior.  She was initially on Metfomin/Glyburide and then switched to Lantus once [place] 2 [place] prior. She also reports having a hx of hyperthyroidism, but has been off of medications for "years"  She report being on Lantus 44u just prior to pregnancy, but has not been on any medications since about 3 weeks prior to conception. She was recently started on Metformin 500mg daily, which was increased to BID a few days prior    Age of Dx:   Comorbidities: None  Prepregnancy regimen:           Lantus 44u daily  Baseline HgA1c: 12.2% (24)  Last A1c:   Lab Results   Component Value Date    HGBA1C 12.2 (H) 2024     Current regimen (as of 2024):   Levemir 20u Qhs  Aspart 5/5/5 WM  FETAL ECHO - ordered  MATERNAL ECHO - pending  Dietary counseling - scheduled for     Blood sugar review:  - Sugars per above. Control is improved, but still suboptimal  She reports good tolerance of insulin    Recommendations:  Given sugars values above we will adjust her regimen:  Levemir 26u Qhs  Aspart 5/6/6 WM  Continue to check BS 4x daily  Send BS log weekly through portal.   Hypoglycemia precautions were once again reviewed.  F/U check in 1 week - virtual scheduled  Twice weekly BPP/ NST + AFV starting at 32 weeks  Growth in 4 weeks from prior (scheduled)  Please see prior note for baseline labs/workup recommended - patient to have blood drawn today  Rx for lancets and strips sent to pharmacy today.    Delivery timin 0/7 to 38 and 6/7 weeks if under good control without comorbidities  37 0/7 to 37 and 67 weeks if longstanding diabetes or poorly controlled, polyhydramnios, EFW>90th percentile, or BMI >= 40  36 0/7 to 37 and 6/7 weeks if vascular complications, prior stillbirth or other complicating conditions.  Recommend consideration of earlier delivery if IUGR, HTN, or other complications  Recommend offering  for delivery is EFW is " 4500g or more near the time of delivery

## 2024-03-25 NOTE — PROGRESS NOTES
"Maternal Fetal Medicine follow up consult    The patient location is: home  The chief complaint leading to consultation is: BS check    Visit type: audiovisual    Face to Face time with patient: 17m 25s    Each patient to whom he or she provides medical services by telemedicine is:  (1) informed of the relationship between the physician and patient and the respective role of any other health care provider with respect to management of the patient; and (2) notified that he or she may decline to receive medical services by telemedicine and may withdraw from such care at any time.    SUBJECTIVE:     Sri Gomez is a 33 y.o.  female with IUP at 9w6d who is seen in follow up consultation by [facility].  Pregnancy complications include:   Problem   Type 2 Diabetes Mellitus Affecting Pregnancy in First Trimester, Antepartum     Previous notes reviewed.   No changes to medical, surgical, family, social, or obstetric history.    Interval history since last M visit:   Patient has no complaints today. She is overall feeling well.  Patient denies any contractions/cramping, vaginal bleeding or leakage of fluid.    Medications:  Current Outpatient Medications   Medication Instructions    folic acid (FOLVITE) 400 mcg, Oral, Daily    insulin aspart U-100 (NOVOLOG) 100 unit/mL (3 mL) InPn pen Inject 5 Units into the skin before breakfast AND 6 Units with lunch AND 6 Units before dinner.    LEVEMIR FLEXPEN 26 Units, Subcutaneous, Nightly    pen needle, diabetic 32 gauge x 5/32" Ndle 1 Syringe, Misc.(Non-Drug; Combo Route), 4 times daily    polyethylene glycol (GLYCOLAX) 17 g, Oral, Daily    senna (SENOKOT) 8.6 mg tablet 1 tablet, Oral, Daily    TRUE METRIX GLUCOSE METER Misc use TO test blood glucose FOUR TIMES DAILY    TRUE METRIX GLUCOSE TEST STRIP Strp 1 strip, Subcutaneous, 4 times daily    TRUEPLUS LANCETS 33 gauge Misc 1 lancet , Subcutaneous, 4 times daily     Care team members:  Jose Roberto - Primary OB   " "  OBJECTIVE:   LMP 01/10/2024 (Approximate)     Physical Exam:  Deferred    Significant labs/imaging:      ASSESSMENT/PLAN:     33 y.o.  female with IUP at 9w6d    Type 2 diabetes mellitus affecting pregnancy in first trimester, antepartum  The patient reports diagnosis of T2DM in [place] 9 years prior.  She was initially on Metfomin/Glyburide and then switched to Lantus once [place]. She also reports having a hx of hyperthyroidism, but has been off of medications for "years"  She report being on Lantus 44u just prior to pregnancy, but has not been on any medications since about 3 weeks prior to conception. She was recently started on Metformin 500mg daily, which was increased to BID a few days prior    Age of Dx:   Comorbidities: None  Prepregnancy regimen:           Lantus 44u daily  Baseline HgA1c: 12.2% (24)  Last A1c:   Lab Results   Component Value Date    HGBA1C 12.2 (H) 2024     Current regimen (as of 2024):   Levemir 20u Qhs  Aspart 5/5/5 WM  FETAL ECHO - ordered  MATERNAL ECHO - pending  Dietary counseling - scheduled for     Blood sugar review:  - Sugars per above. Control is improved, but still suboptimal  She reports good tolerance of insulin    Recommendations:  Given sugars values above we will adjust her regimen:  Levemir 26u Qhs  Aspart 5/6/6 WM  Continue to check BS 4x daily  Send BS log weekly through portal.   Hypoglycemia precautions were once again reviewed.  F/U check in 1 week - virtual scheduled  Twice weekly BPP/ NST + AFV starting at 32 weeks  Growth in 4 weeks from prior (scheduled)  Please see prior note for baseline labs/workup recommended - patient to have blood drawn today  Rx for lancets and strips sent to pharmacy today.    Delivery timin 0/7 to 38 and 6/7 weeks if under good control without comorbidities  37 0/7 to 37 and 67 weeks if longstanding diabetes or poorly controlled, polyhydramnios, EFW>90th percentile, or BMI " >= 40  36 0/7 to 37 and 6/7 weeks if vascular complications, prior stillbirth or other complicating conditions.  Recommend consideration of earlier delivery if IUGR, HTN, or other complications  Recommend offering  for delivery is EFW is 4500g or more near the time of delivery      FOLLOW UP:   A follow up MFM MD visit will be made for 1 week for BS check.       Robinson Joiner MD   Maternal-Fetal Medicine      Electronically Signed by Robinson Joiner 2024

## 2024-03-27 ENCOUNTER — LAB VISIT (OUTPATIENT)
Dept: LAB | Facility: HOSPITAL | Age: 34
End: 2024-03-27
Payer: MEDICAID

## 2024-03-27 ENCOUNTER — PATIENT MESSAGE (OUTPATIENT)
Dept: PEDIATRIC CARDIOLOGY | Facility: CLINIC | Age: 34
End: 2024-03-27
Payer: MEDICAID

## 2024-03-27 ENCOUNTER — TELEPHONE (OUTPATIENT)
Dept: PEDIATRIC CARDIOLOGY | Facility: CLINIC | Age: 34
End: 2024-03-27
Payer: MEDICAID

## 2024-03-27 DIAGNOSIS — O24.311 PRE-EXISTING DIABETES MELLITUS DURING PREGNANCY IN FIRST TRIMESTER: ICD-10-CM

## 2024-03-27 LAB
ESTIMATED AVG GLUCOSE: 229 MG/DL (ref 68–131)
HBA1C MFR BLD: 9.6 % (ref 4–5.6)
T4 FREE SERPL-MCNC: 0.83 NG/DL (ref 0.71–1.51)
TSH SERPL DL<=0.005 MIU/L-ACNC: 4.56 UIU/ML (ref 0.4–4)

## 2024-03-27 PROCEDURE — 84439 ASSAY OF FREE THYROXINE: CPT | Performed by: STUDENT IN AN ORGANIZED HEALTH CARE EDUCATION/TRAINING PROGRAM

## 2024-03-27 PROCEDURE — 84443 ASSAY THYROID STIM HORMONE: CPT | Performed by: STUDENT IN AN ORGANIZED HEALTH CARE EDUCATION/TRAINING PROGRAM

## 2024-03-27 PROCEDURE — 83036 HEMOGLOBIN GLYCOSYLATED A1C: CPT | Performed by: STUDENT IN AN ORGANIZED HEALTH CARE EDUCATION/TRAINING PROGRAM

## 2024-03-27 PROCEDURE — 36415 COLL VENOUS BLD VENIPUNCTURE: CPT | Performed by: STUDENT IN AN ORGANIZED HEALTH CARE EDUCATION/TRAINING PROGRAM

## 2024-03-27 NOTE — TELEPHONE ENCOUNTER
Call placed to patient with the assistance of Ken Valadez with [facility] in an attempt to schedule her fetal echo. Call answered by voicemail. A voicemail was left by Allyson requesting a return call to [phone] to schedule her fetal echo.

## 2024-04-03 ENCOUNTER — PATIENT MESSAGE (OUTPATIENT)
Dept: MATERNAL FETAL MEDICINE | Facility: CLINIC | Age: 34
End: 2024-04-03

## 2024-04-03 ENCOUNTER — OFFICE VISIT (OUTPATIENT)
Dept: MATERNAL FETAL MEDICINE | Facility: CLINIC | Age: 34
End: 2024-04-03
Payer: MEDICAID

## 2024-04-03 DIAGNOSIS — O24.111 TYPE 2 DIABETES MELLITUS AFFECTING PREGNANCY IN FIRST TRIMESTER, ANTEPARTUM: Primary | ICD-10-CM

## 2024-04-03 PROCEDURE — 3046F HEMOGLOBIN A1C LEVEL >9.0%: CPT | Mod: CPTII,95,, | Performed by: OBSTETRICS & GYNECOLOGY

## 2024-04-03 PROCEDURE — 99214 OFFICE O/P EST MOD 30 MIN: CPT | Mod: TH,95,, | Performed by: OBSTETRICS & GYNECOLOGY

## 2024-04-03 RX ORDER — INSULIN HUMAN 100 [IU]/ML
INJECTION, SUSPENSION SUBCUTANEOUS
Qty: 10.2 ML | Refills: 11 | Status: SHIPPED | OUTPATIENT
Start: 2024-04-03 | End: 2024-04-10

## 2024-04-03 RX ORDER — INSULIN ASPART 100 [IU]/ML
INJECTION, SOLUTION INTRAVENOUS; SUBCUTANEOUS
Qty: 9 ML | Refills: 4 | Status: SHIPPED | OUTPATIENT
Start: 2024-04-03 | End: 2025-04-03

## 2024-04-03 NOTE — ASSESSMENT & PLAN NOTE
Age of Dx: 2015 (in [place] 9 years prior)  Comorbidities: None  Prepregnancy regimen:           Lantus 44u daily  Baseline HgA1c: 12.2% (2/20/24)  Last A1c:   Lab Results   Component Value Date    HGBA1C 9.6 (H) 03/27/2024     Current regimen (as of 04/03/2024):   Levemir 25u Qhs  Aspart 5/6/6 WM  FETAL ECHO - ordered  MATERNAL ECHO - pending  Dietary counseling - scheduled for 4/4    Blood sugar review:  - Sugars per above. Control is still suboptimal, specifically for fastings and occasionally in late meals.  She reports good tolerance of insulin.  We discussed the mode of action for her insulin type. Given that most of her control issue is during fastings, I believe switching to a different 'long' acting insulin will help, specifically NPH. We discussed the mid acting timing and the needs for twice daily dosing. We discussed the risks for switching and the risk of hypoglycemia with new insulin type.   Thyroid studies consistent with pregnancy changes on thyroid. No intervention needed.    Recommendations:  Given sugars values above we will adjust her regimen:  NPH 20/14  Aspart 5/x/6 WM (stop lunch insulin)  Stop Levemir  Continue to check BS 4x daily  Send BS log weekly through portal.   Hypoglycemia precautions were once again reviewed.  F/U check in 1 week - virtual scheduled  Twice weekly BPP/ NST + AFV starting at 32 weeks  Growth in 4 weeks from prior (scheduled)  New A1c/Thyroid labs noted per above.    Delivery timing:  Per prior recs

## 2024-04-03 NOTE — PROGRESS NOTES
"Maternal Fetal Medicine follow up consult    The patient location is: home  The chief complaint leading to consultation is: BS check    Visit type: audiovisual    Face to Face time with patient: 9m 54s    Each patient to whom he or she provides medical services by telemedicine is:  (1) informed of the relationship between the physician and patient and the respective role of any other health care provider with respect to management of the patient; and (2) notified that he or she may decline to receive medical services by telemedicine and may withdraw from such care at any time.    SUBJECTIVE:     Sri Gomez is a 33 y.o.  female with IUP at 11w1d who is seen in follow up consultation by M.  Pregnancy complications include:   Problem   Type 2 Diabetes Mellitus Affecting Pregnancy in First Trimester, Antepartum     Previous notes reviewed.   No changes to medical, surgical, family, social, or obstetric history.    Interval history since last M visit:   Patient has no complaints today. She is overall feeling well.  Patient denies any contractions/cramping, vaginal bleeding or leakage of fluid.  Tolerating meds well.    Medications:  Current Outpatient Medications   Medication Instructions    folic acid (FOLVITE) 400 mcg, Oral, Daily    insulin aspart U-100 (NOVOLOG) 100 unit/mL (3 mL) InPn pen Inject 5 Units into the skin before breakfast AND 6 Units before dinner.    insulin NPH isoph U-100 human (HUMULIN N NPH INSULIN KWIKPEN) 100 unit/mL (3 mL) InPn Inject 20 Units into the skin before breakfast AND 14 Units every evening.    pen needle, diabetic 32 gauge x 5/32" Ndle 1 Syringe, Misc.(Non-Drug; Combo Route), 4 times daily    polyethylene glycol (GLYCOLAX) 17 g, Oral, Daily    senna (SENOKOT) 8.6 mg tablet 1 tablet, Oral, Daily    TRUE METRIX GLUCOSE METER Misc use TO test blood glucose FOUR TIMES DAILY    TRUE METRIX GLUCOSE TEST STRIP Strp 1 strip, Subcutaneous, 4 times daily    " TRUEPLUS LANCETS 33 gauge Misc 1 lancet , Subcutaneous, 4 times daily     Care team members:  Jose Roberto > Darryl - Primary OB     OBJECTIVE:   LMP 01/10/2024 (Approximate)     Physical Exam:  Deferred    Significant labs/imaging:      ASSESSMENT/PLAN:     33 y.o.  female with IUP at 11w1d    Type 2 diabetes mellitus affecting pregnancy in first trimester, antepartum  Age of Dx:  (in [place] 9 years prior)  Comorbidities: None  Prepregnancy regimen:           Lantus 44u daily  Baseline HgA1c: 12.2% (24)  Last A1c:   Lab Results   Component Value Date    HGBA1C 9.6 (H) 2024     Current regimen (as of 2024):   Levemir 25u Qhs  Aspart 5/6/6 WM  FETAL ECHO - ordered  MATERNAL ECHO - pending  Dietary counseling - scheduled for     Blood sugar review:  - Sugars per above. Control is still suboptimal, specifically for fastings and occasionally in late meals.  She reports good tolerance of insulin.  We discussed the mode of action for her insulin type. Given that most of her control issue is during fastings, I believe switching to a different 'long' acting insulin will help, specifically NPH. We discussed the mid acting timing and the needs for twice daily dosing. We discussed the risks for switching and the risk of hypoglycemia with new insulin type.   Thyroid studies consistent with pregnancy changes on thyroid. No intervention needed.    Recommendations:  Given sugars values above we will adjust her regimen:  NPH 20/14  Aspart 5/x/6 WM (stop lunch insulin)  Stop Levemir  Continue to check BS 4x daily  Send BS log weekly through portal.   Hypoglycemia precautions were once again reviewed.  F/U check in 1 week - virtual scheduled  Twice weekly BPP/ NST + AFV starting at 32 weeks  Growth in 4 weeks from prior (scheduled)  New A1c/Thyroid labs noted per above.    Delivery timing:  Per prior recs      FOLLOW UP:   A follow up [facility] visit will be made for 1 week for BS check.       Robinson Joiner MD    Maternal-Fetal Medicine      Electronically Signed by Robinson Joiner April 3, 2024

## 2024-04-04 ENCOUNTER — CLINICAL SUPPORT (OUTPATIENT)
Dept: DIABETES | Facility: CLINIC | Age: 34
End: 2024-04-04
Payer: MEDICAID

## 2024-04-04 DIAGNOSIS — O24.111 TYPE 2 DIABETES MELLITUS AFFECTING PREGNANCY IN FIRST TRIMESTER, ANTEPARTUM: ICD-10-CM

## 2024-04-04 PROCEDURE — G0108 DIAB MANAGE TRN  PER INDIV: HCPCS | Mod: PBBFAC,PO | Performed by: DIETITIAN, REGISTERED

## 2024-04-04 PROCEDURE — 99999PBSHW PR PBB SHADOW TECHNICAL ONLY FILED TO HB: Mod: PBBFAC,,,

## 2024-04-05 NOTE — PROGRESS NOTES
Diabetes Care Specialist Progress Note  Author: Yodit Corbin RD  Date: 4/5/2024    Program Intake  Reason for Diabetes Program Visit:: Initial Diabetes Assessment  Current diabetes risk level:: moderate  In the last 12 months, have you:: used emergency room services  Was the ER or hospital admission related to diabetes?: Yes  Permission to speak with others about care:: no  Continuous Glucose Monitoring  Patient has CGM: No    Lab Results   Component Value Date    HGBA1C 9.6 (H) 03/27/2024       Clinical    Problem Review  Reviewed Problem List with Patient: yes  Active comorbidities affecting diabetes self-care.: no    Clinical Assessment  Current Diabetes Treatment: Injectable  Have you ever experienced hypoglycemia (low blood sugar)?: no (unsure - she gets really hungry between meals, but does not know if it is a low BG or just related to hunger.)  Have you ever experienced hyperglycemia (high blood sugar)?: yes  In the last month, how often have you experienced high blood sugar?: once a day  Are you able to tell when your blood sugar is high?: No (comment)  Have you ever been hospitalized because your blood sugar was high?: no (was in ER for hyperglycemia)    Medication Information  How do you obtain your medications?: Patient drives  How many days a week do you miss your medications?: Never  Do you sometimes have difficulty refilling your medications?: No  Medication adherence impacting ability to self-manage diabetes?: No    Labs  Do you have regular lab work to monitor your medications?: Yes  Type of Regular Lab Work: A1c, BMP  Lab Compliance Barriers: No    Nutritional Status  Diet: Regular  Meal Plan 24 Hour Recall: Breakfast, Lunch, Dinner, Snack  Meal Plan 24 Hour Recall - Breakfast: milk, papaya, tortilla with cheese  Meal Plan 24 Hour Recall - Lunch: checken stew, lettuce, 1/2 potato  Meal Plan 24 Hour Recall - Dinner: same as lunch or dinner  Meal Plan 24 Hour Recall - Snack: fruit, drinks water or  water with pineapple juice  Change in appetite?: Yes  Dentation:: Intact  Recent Changes in Weight: Weight Gain (due to pregnancy)  Current nutritional status an area of need that is impacting patient's ability to self-manage diabetes?: Yes    Additional Social History    Support  Does anyone support you with your diabetes care?: yes  Who supports you?: self, family member  Who takes you to your medical appointments?: self  Does the current support meet the patient's needs?: Yes  Is Support an area impacting ability to self-manage diabetes?: No    Access to Mass Media & Technology  Does the patient have access to any of the following devices or technologies?: Smart phone  Media or technology needs impacting ability to self-manage diabetes?: No    Cognitive/Behavioral Health  Alert and Oriented: Yes  Difficulty Thinking: No  Requires Prompting: No  Requires assistance for routine expression?: No  Cognitive or behavioral barriers impacting ability to self-manage diabetes?: No    Culture/Synagogue  Culture or Islam beliefs that may impact ability to access healthcare: No    Communication  Language preference: English  Hearing Problems: No  Vision Problems: No  Communication needs impacting ability to self-manage diabetes?: No    Health Literacy  Preferred Learning Method: Face to Face  How often do you need to have someone help you read instructions, pamphlets, or written material from your doctor or pharmacy?: Rarely  Health literacy needs impacting ability to self-manage diabetes?: No      Diabetes Self-Management Skills Assessment    Diabetes Disease Process/Treatment Options  Patient/caregiver able to state what happens when someone has diabetes.: somewhat  Patient/caregiver knows what type of diabetes they have.: yes  Diabetes Type : Type II  Patient/caregiver able to identify at least three signs and symptoms of diabetes.: yes  Identified signs and symptoms:: increased thirst, frequent urination  Patient able  to identify at least three risk factors for diabetes.: no  Diabetes Disease Process/Treatment Options: Skills Assessment Completed: Yes  Assessment indicates:: Instruction Needed  Area of need?: Yes    Nutrition/Healthy Eating  Challenges to healthy eating:: portion control  Method of carbohydrate measurement:: no method  Patient can identify foods that impact blood sugar.: yes  Patient-identified foods:: sweets, starches (bread, pasta, rice, cereal)  Nutrition/Healthy Eating Skills Assessment Completed:: Yes  Assessment indicates:: Instruction Needed  Area of need?: Yes    Physical Activity/Exercise  Patient's daily activity level:: lightly active  Patient formally exercises outside of work.: yes  Exercise Type: walking  Intensity: Low  Frequency: four or more times a week  Duration: 30 min  Patient can identify forms of physical activity.: yes  Stated forms of physical activity:: any movement performed by muscles that uses energy  Patient can identify reasons why exercise/physical activity is important in diabetes management.: no  Physical Activity/Exercise Skills Assessment Completed: : Yes  Assessment indicates:: Instruction Needed  Area of need?: Yes    Medications  Patient is able to describe current diabetes management routine.: yes  Diabetes management routine:: oral medications, insulin, diet  Patient is able to identify current diabetes medications, dosages, and appropriate timing of medications.: yes  Patient understands the purpose of the medications taken for diabetes.: yes  Patient reports problems or concerns with current medication regimen.: no  Medication Skills Assessment Completed:: Yes  Assessment indicates:: Adequate understanding  Area of need?: No    Home Blood Glucose Monitoring  Patient states that blood sugar is checked at home daily.: yes  Monitoring Method:: home glucometer  How often do you check your blood sugar?: 4 times a day  When do you check your blood sugar?: Before breakfast, 2  hours after meal  When you check what is your typical blood sugar range? : Fasting BG are elevated - 117-162 with kelton in the 120-130 range; Postprandial: after breakfast below 120 mg/dL; lunch and dinner: 110-178 with most in the 120-150 mg/dL  Blood glucose logs:: yes  Blood glucose logs reviewed today?: yes  Home Blood Glucose Monitoring Skills Assessment Completed: : Yes  Assessment indicates:: Instruction Needed  Area of need?: Yes    Acute Complications  Acute Complications Skills Assessment Completed: : No  Deffered due to:: Time    Chronic Complications  Chronic Complications Skills Assessment Completed: : No  Deferred due to:: Time    Psychosocial/Coping  Psychosocial/Coping Skills Assessment Completed: : No  Deffered due to:: Time      Assessment Summary and Plan    Based on today's diabetes care assessment, the following areas of need were identified:          4/4/2024    12:01 AM   Social   Support No   Access to Mass Media/Tech No   Cognitive/Behavioral Health No   Culture/Episcopalian No   Communication No   Health Literacy No            4/4/2024    12:01 AM   Clinical   Medication Adherence No   Lab Compliance No   Nutritional Status Yes, see care plan below            4/4/2024    12:01 AM   Diabetes Self-Management Skills   Diabetes Disease Process/Treatment Options Yes, taught pathos of DM and how it affects pregnancy   Nutrition/Healthy Eating Yes, se care plan below   Physical Activity/Exercise Yes,  taught effects of activity on BG   Medication No   Home Blood Glucose Monitoring Yes, taught BG target ranges          Today's interventions were provided through individual discussion, instruction, and written materials were provided.      Patient verbalized understanding of instruction and written materials.  Pt was able to return back demonstration of instructions today. Patient understood key points, needs reinforcement and further instruction.     Diabetes Self-Management Care Plan:    Today's  Diabetes Self-Management Care Plan was developed with Sri's input. Sri has agreed to work toward the following goal(s) to improve his/her overall diabetes control.            Follow Up Plan     Follow up in about 1 month (around 5/4/2024). Review BG log, carb counting and meal planning, check weight; assess for hypoglycemia.    Today's care plan and follow up schedule was discussed with patient.  Sri verbalized understanding of the care plan, goals, and agrees to follow up plan.        The patient was encouraged to communicate with his/her health care provider/physician and care team regarding his/her condition(s) and treatment.  I provided the patient with my contact information today and encouraged to contact me via phone or [facility] Patient Portal as needed.     Length of Visit   Total Time: 60 Minutes

## 2024-04-08 ENCOUNTER — ROUTINE PRENATAL (OUTPATIENT)
Dept: OBSTETRICS AND GYNECOLOGY | Facility: CLINIC | Age: 34
End: 2024-04-08
Payer: MEDICAID

## 2024-04-08 ENCOUNTER — PATIENT MESSAGE (OUTPATIENT)
Dept: MATERNAL FETAL MEDICINE | Facility: CLINIC | Age: 34
End: 2024-04-08
Payer: MEDICAID

## 2024-04-08 VITALS — BODY MASS INDEX: 27.72 KG/M2 | WEIGHT: 156 LBS | DIASTOLIC BLOOD PRESSURE: 73 MMHG | SYSTOLIC BLOOD PRESSURE: 109 MMHG

## 2024-04-08 DIAGNOSIS — O09.91 SUPERVISION OF HIGH RISK PREGNANCY IN FIRST TRIMESTER: Primary | ICD-10-CM

## 2024-04-08 DIAGNOSIS — O24.311 PRE-EXISTING DIABETES MELLITUS DURING PREGNANCY IN FIRST TRIMESTER: ICD-10-CM

## 2024-04-08 PROCEDURE — 99212 OFFICE O/P EST SF 10 MIN: CPT | Mod: PBBFAC,TH,PO | Performed by: OBSTETRICS & GYNECOLOGY

## 2024-04-08 PROCEDURE — 99999 PR PBB SHADOW E&M-EST. PATIENT-LVL II: CPT | Mod: PBBFAC,,, | Performed by: OBSTETRICS & GYNECOLOGY

## 2024-04-08 PROCEDURE — 99214 OFFICE O/P EST MOD 30 MIN: CPT | Mod: TH,S$PBB,, | Performed by: OBSTETRICS & GYNECOLOGY

## 2024-04-08 NOTE — PROGRESS NOTES
#[id number]    HPI:   34 y.o.        Patient doing well except constipation and epigastric discomfort. Can use Miralax and Pepcid. No vaginal bleeding or cramping noted. Discussed the need for an anatomy scan approximately at 20 weeks. Discussed quad/aneuploidy screen.     ROS:  GENERAL: Denies weight gain or weight loss. Feeling well overall.   HEAD: No headache.   ABDOMEN: No  diarrhea, nausea, vomiting.   URINARY: No frequency, dysuria, hematuria, or burning on urination.  EXTREMITIES: No swelling     Vitals signs, FHTs, urine dip, and PE findings documented, reviewed and available in OB flow chart.       I spent a total of 15 minutes on the day of the visit.This includes face to face time and non-face to face time preparing to see the patient (eg, review of tests), Obtaining and/or reviewing separately obtained history, Documenting clinical information in the electronic or other health record, Independently interpreting resultsand communicating results to the patient/family/caregiver, or Care coordination.           ASSESSMENT:  11w6d  Reflux  Constipation  Type 2 DM--forgot BS log--says 80 fasting this am and 110 after lunch  [facility] doing virtual management of BS     PLAN:  RTO 5 weeks

## 2024-04-10 ENCOUNTER — OFFICE VISIT (OUTPATIENT)
Dept: MATERNAL FETAL MEDICINE | Facility: CLINIC | Age: 34
End: 2024-04-10
Payer: MEDICAID

## 2024-04-10 ENCOUNTER — PATIENT MESSAGE (OUTPATIENT)
Dept: MATERNAL FETAL MEDICINE | Facility: CLINIC | Age: 34
End: 2024-04-10
Payer: MEDICAID

## 2024-04-10 DIAGNOSIS — O24.111 TYPE 2 DIABETES MELLITUS AFFECTING PREGNANCY IN FIRST TRIMESTER, ANTEPARTUM: Primary | ICD-10-CM

## 2024-04-10 PROCEDURE — 99214 OFFICE O/P EST MOD 30 MIN: CPT | Mod: TH,95,, | Performed by: OBSTETRICS & GYNECOLOGY

## 2024-04-10 PROCEDURE — 3046F HEMOGLOBIN A1C LEVEL >9.0%: CPT | Mod: CPTII,95,, | Performed by: OBSTETRICS & GYNECOLOGY

## 2024-04-10 RX ORDER — INSULIN HUMAN 100 [IU]/ML
INJECTION, SUSPENSION SUBCUTANEOUS
Qty: 12 ML | Refills: 11 | Status: SHIPPED | OUTPATIENT
Start: 2024-04-10 | End: 2024-04-17

## 2024-04-10 NOTE — PROGRESS NOTES
"Maternal Fetal Medicine follow up consult    The patient location is: home  The chief complaint leading to consultation is: BS check    Visit type: audiovisual    Face to Face time with patient: 13m 13s    Each patient to whom he or she provides medical services by telemedicine is:  (1) informed of the relationship between the physician and patient and the respective role of any other health care provider with respect to management of the patient; and (2) notified that he or she may decline to receive medical services by telemedicine and may withdraw from such care at any time.    SUBJECTIVE:     Sri Gomez is a 34 y.o.  female with IUP at 12w1d who is seen in follow up consultation by M.  Pregnancy complications include:   Problem   Type 2 Diabetes Mellitus Affecting Pregnancy in First Trimester, Antepartum     Previous notes reviewed.   No changes to medical, surgical, family, social, or obstetric history.    Interval history since last M visit:   She is overall feeling well. Does report some epigastric pain/discomfort  Patient denies any contractions/cramping, vaginal bleeding or leakage of fluid.  Tolerating meds well.    Medications:  Current Outpatient Medications   Medication Instructions    folic acid (FOLVITE) 400 mcg, Oral, Daily    insulin aspart U-100 (NOVOLOG) 100 unit/mL (3 mL) InPn pen Inject 5 Units into the skin before breakfast AND 6 Units before dinner.    insulin NPH isoph U-100 human (HUMULIN N NPH INSULIN KWIKPEN) 100 unit/mL (3 mL) InPn Inject 22 Units into the skin before breakfast AND 18 Units every evening.    pen needle, diabetic 32 gauge x 5/32" Ndle 1 Syringe, Misc.(Non-Drug; Combo Route), 4 times daily    polyethylene glycol (GLYCOLAX) 17 g, Oral, Daily    senna (SENOKOT) 8.6 mg tablet 1 tablet, Oral, Daily    TRUE METRIX GLUCOSE METER Misc use TO test blood glucose FOUR TIMES DAILY    TRUE METRIX GLUCOSE TEST STRIP Strp 1 strip, Subcutaneous, 4 times " daily    TRUEPLUS LANCETS 33 gauge Misc 1 lancet , Subcutaneous, 4 times daily     Care team members:  Darryl - Primary OB     OBJECTIVE:   LMP 01/10/2024 (Approximate)     Physical Exam:  Deferred    Significant labs/imaging:      ASSESSMENT/PLAN:     34 y.o.  female with IUP at 12w1d    Type 2 diabetes mellitus affecting pregnancy in first trimester, antepartum  Age of Dx:  (in [place] 9 years prior)  Comorbidities: None  Prepregnancy regimen:           Lantus 44u daily  Baseline HgA1c: 12.2% (24)  Last A1c:   Lab Results   Component Value Date    HGBA1C 9.6 (H) 2024     Current regimen (as of 04/10/2024):   NPH 20/  Aspart 5/X/6 WM  FETAL ECHO - ordered  MATERNAL ECHO - pending  Dietary counseling - scheduled for     Blood sugar review:  - Sugars per above. Control is still suboptimal, specifically for fastings. Post meals mostly at goal, except for lunch (barely above)  She reports good tolerance of insulin.  Will adjust nigghttime insulin    Recommendations:  Given sugars values above we will adjust her regimen:  NPH /  Aspart 5/x/6 WM   Continue to check BS 4x daily  Send BS log weekly through portal.   Hypoglycemia precautions were once again reviewed.  F/U check in 1 week - scheduled  Twice weekly BPP/ NST + AFV starting at 32 weeks  Growth in 4 weeks from prior (scheduled)    Delivery timing:  Per prior recs      Recommend patient to start Tums for possible GI issues/GERD.  Discussed upper back pain and the use of Tylenol.       FOLLOW UP:   A follow up [facility] visit was previously made for 1 week for BS check and growth.      Robinson Joiner MD   Maternal-Fetal Medicine      Electronically Signed by Robinson Joiner April 10, 2024

## 2024-04-10 NOTE — ASSESSMENT & PLAN NOTE
Age of Dx: 2015 (in [place] 9 years prior)  Comorbidities: None  Prepregnancy regimen:           Lantus 44u daily  Baseline HgA1c: 12.2% (2/20/24)  Last A1c:   Lab Results   Component Value Date    HGBA1C 9.6 (H) 03/27/2024     Current regimen (as of 04/10/2024):   NPH 20/14  Aspart 5/X/6 WM  FETAL ECHO - ordered  MATERNAL ECHO - pending  Dietary counseling - scheduled for 4/4    Blood sugar review:  - Sugars per above. Control is still suboptimal, specifically for fastings. Post meals mostly at goal, except for lunch (barely above)  She reports good tolerance of insulin.  Will adjust nigghttime insulin    Recommendations:  Given sugars values above we will adjust her regimen:  NPH 22/18  Aspart 5/x/6 WM   Continue to check BS 4x daily  Send BS log weekly through portal.   Hypoglycemia precautions were once again reviewed.  F/U check in 1 week - scheduled  Twice weekly BPP/ NST + AFV starting at 32 weeks  Growth in 4 weeks from prior (scheduled)    Delivery timing:  Per prior recs

## 2024-04-16 ENCOUNTER — PATIENT MESSAGE (OUTPATIENT)
Dept: MATERNAL FETAL MEDICINE | Facility: CLINIC | Age: 34
End: 2024-04-16
Payer: MEDICAID

## 2024-04-17 ENCOUNTER — OFFICE VISIT (OUTPATIENT)
Dept: MATERNAL FETAL MEDICINE | Facility: CLINIC | Age: 34
End: 2024-04-17
Payer: MEDICAID

## 2024-04-17 ENCOUNTER — PROCEDURE VISIT (OUTPATIENT)
Dept: MATERNAL FETAL MEDICINE | Facility: CLINIC | Age: 34
End: 2024-04-17
Payer: MEDICAID

## 2024-04-17 VITALS
WEIGHT: 153.88 LBS | DIASTOLIC BLOOD PRESSURE: 76 MMHG | BODY MASS INDEX: 27.27 KG/M2 | SYSTOLIC BLOOD PRESSURE: 112 MMHG | HEIGHT: 63 IN

## 2024-04-17 DIAGNOSIS — Z36.89 ENCOUNTER FOR FETAL ANATOMIC SURVEY: Primary | ICD-10-CM

## 2024-04-17 DIAGNOSIS — Z36.82 ENCOUNTER FOR ANTENATAL SCREENING FOR NUCHAL TRANSLUCENCY: ICD-10-CM

## 2024-04-17 DIAGNOSIS — O24.111 TYPE 2 DIABETES MELLITUS AFFECTING PREGNANCY IN FIRST TRIMESTER, ANTEPARTUM: Primary | ICD-10-CM

## 2024-04-17 PROCEDURE — 76813 OB US NUCHAL MEAS 1 GEST: CPT | Mod: PBBFAC | Performed by: OBSTETRICS & GYNECOLOGY

## 2024-04-17 PROCEDURE — 3008F BODY MASS INDEX DOCD: CPT | Mod: CPTII,,, | Performed by: OBSTETRICS & GYNECOLOGY

## 2024-04-17 PROCEDURE — 99214 OFFICE O/P EST MOD 30 MIN: CPT | Mod: S$PBB,TH,, | Performed by: OBSTETRICS & GYNECOLOGY

## 2024-04-17 PROCEDURE — 76813 OB US NUCHAL MEAS 1 GEST: CPT | Mod: 26,S$PBB,, | Performed by: OBSTETRICS & GYNECOLOGY

## 2024-04-17 PROCEDURE — 99999 PR PBB SHADOW E&M-EST. PATIENT-LVL III: CPT | Mod: PBBFAC,,, | Performed by: OBSTETRICS & GYNECOLOGY

## 2024-04-17 PROCEDURE — 3046F HEMOGLOBIN A1C LEVEL >9.0%: CPT | Mod: CPTII,,, | Performed by: OBSTETRICS & GYNECOLOGY

## 2024-04-17 PROCEDURE — 3078F DIAST BP <80 MM HG: CPT | Mod: CPTII,,, | Performed by: OBSTETRICS & GYNECOLOGY

## 2024-04-17 PROCEDURE — 3074F SYST BP LT 130 MM HG: CPT | Mod: CPTII,,, | Performed by: OBSTETRICS & GYNECOLOGY

## 2024-04-17 PROCEDURE — 99213 OFFICE O/P EST LOW 20 MIN: CPT | Mod: PBBFAC,TH | Performed by: OBSTETRICS & GYNECOLOGY

## 2024-04-17 PROCEDURE — 1159F MED LIST DOCD IN RCRD: CPT | Mod: CPTII,,, | Performed by: OBSTETRICS & GYNECOLOGY

## 2024-04-17 RX ORDER — INSULIN HUMAN 100 [IU]/ML
INJECTION, SUSPENSION SUBCUTANEOUS
Qty: 13.2 ML | Refills: 11 | Status: SHIPPED | OUTPATIENT
Start: 2024-04-17 | End: 2024-05-23

## 2024-04-17 NOTE — PROGRESS NOTES
"Maternal Fetal Medicine follow up consult    SUBJECTIVE:     Sri Gomez is a 34 y.o.  female with IUP at 13w1d who is seen in follow up consultation by MFM.  Pregnancy complications include:   Problem   Type 2 Diabetes Mellitus Affecting Pregnancy in First Trimester, Antepartum     Previous notes reviewed.   No changes to medical, surgical, family, social, or obstetric history.    Interval history since last MFM visit:   Patient has no complaints today. She is overall feeling well.  Patient denies any contractions/cramping, vaginal bleeding or leakage of fluid.    Medications:  Current Outpatient Medications   Medication Instructions    folic acid (FOLVITE) 400 mcg, Oral, Daily    insulin aspart U-100 (NOVOLOG) 100 unit/mL (3 mL) InPn pen Inject 5 Units into the skin before breakfast AND 6 Units before dinner.    insulin NPH isoph U-100 human (HUMULIN N NPH INSULIN KWIKPEN) 100 unit/mL (3 mL) InPn Inject 22 Units into the skin before breakfast AND 22 Units every evening.    pen needle, diabetic 32 gauge x 5/32" Ndle 1 Syringe, Misc.(Non-Drug; Combo Route), 4 times daily    polyethylene glycol (GLYCOLAX) 17 g, Oral, Daily    senna (SENOKOT) 8.6 mg tablet 1 tablet, Oral, Daily    TRUE METRIX GLUCOSE METER Misc use TO test blood glucose FOUR TIMES DAILY    TRUE METRIX GLUCOSE TEST STRIP Strp 1 strip, Subcutaneous, 4 times daily    TRUEPLUS LANCETS 33 gauge Misc 1 lancet , Subcutaneous, 4 times daily     Care team members:  Darryl - Primary OB     OBJECTIVE:   /76 (BP Location: Left arm)   Ht 5' 2.9" (1.598 m)   Wt 69.8 kg (153 lb 14.1 oz)   LMP 01/10/2024 (Approximate)   BMI 27.35 kg/m²     Physical Exam:  Deferred    Ultrasound performed. See viewpoint for full ultrasound report.  Unremarkable early anatomy, failed NT.    Significant labs/imaging:  None new    ASSESSMENT/PLAN:     34 y.o.  female with IUP at 13w1d    Type 2 diabetes mellitus affecting pregnancy in first " trimester, antepartum  Age of Dx: 2015 (in [place] 9 years prior)  Comorbidities: None  Prepregnancy regimen:           Lantus 44u daily  Baseline HgA1c: 12.2% (2/20/24)  Last A1c:   Lab Results   Component Value Date    HGBA1C 9.6 (H) 03/27/2024     Current regimen (as of 04/17/2024):   NPH 22/18  Aspart 5/X/6 WM  FETAL ECHO - ordered  MATERNAL ECHO - pending  Dietary counseling - done    Blood sugar review:  - Sugars per above. Control is still suboptimal, specifically for fastings. Post meals mostly at goal with rare elevations  She reports good tolerance of insulin.  She reports feeling hungry late at night and eating some fruit (pineapple, apples, strawberries). We discussed smart snacking and considering more protein rich (ham, cheese, milk) and nuts to help with snacking at that time.  Will adjust nighttime insulin.    Recommendations:  Given sugars values above we will adjust her regimen:  NPH 22/22  Aspart 5/x/6 WM   Continue to check BS 4x daily  Hypoglycemia precautions were once again reviewed.  F/U check in 2 weeks virtually - scheduled  Targeted scan at 18-20 weeks. - scheduled  Twice weekly BPP/ NST + AFV starting at 32 weeks    Delivery timing:  Per prior recs      FOLLOW UP:   A follow up ultrasound will be made for 18-20 weeks' gestation. A follow up MFM MD visit will be made for 2 weeks from today - virtual BS check.      The patient was given an opportunity to ask questions about the management of her high risk pregnancy problems. She expressed an understanding of and agreement to the above impression and plan. All questions were answered to her satisfaction.        Robinson Joiner MD   Maternal-Fetal Medicine      Electronically Signed by Robinson Joiner April 17, 2024

## 2024-04-17 NOTE — ASSESSMENT & PLAN NOTE
Age of Dx: 2015 (in [place] 9 years prior)  Comorbidities: None  Prepregnancy regimen:           Lantus 44u daily  Baseline HgA1c: 12.2% (2/20/24)  Last A1c:   Lab Results   Component Value Date    HGBA1C 9.6 (H) 03/27/2024     Current regimen (as of 04/17/2024):   NPH 22/18  Aspart 5/X/6 WM  FETAL ECHO - ordered  MATERNAL ECHO - pending  Dietary counseling - done    Blood sugar review:  - Sugars per above. Control is still suboptimal, specifically for fastings. Post meals mostly at goal with rare elevations  She reports good tolerance of insulin.  She reports feeling hungry late at night and eating some fruit (pineapple, apples, strawberries). We discussed smart snacking and considering more protein rich (ham, cheese, milk) and nuts to help with snacking at that time.  Will adjust nighttime insulin.    Recommendations:  Given sugars values above we will adjust her regimen:  NPH 22/22  Aspart 5/x/6 WM   Continue to check BS 4x daily  Hypoglycemia precautions were once again reviewed.  F/U check in 2 weeks virtually - scheduled  Targeted scan at 18-20 weeks. - scheduled  Twice weekly BPP/ NST + AFV starting at 32 weeks    Delivery timing:  Per prior recs

## 2024-05-01 ENCOUNTER — PATIENT MESSAGE (OUTPATIENT)
Dept: MATERNAL FETAL MEDICINE | Facility: CLINIC | Age: 34
End: 2024-05-01
Payer: MEDICAID

## 2024-05-06 ENCOUNTER — CLINICAL SUPPORT (OUTPATIENT)
Dept: DIABETES | Facility: CLINIC | Age: 34
End: 2024-05-06
Payer: MEDICAID

## 2024-05-06 ENCOUNTER — PATIENT MESSAGE (OUTPATIENT)
Dept: MATERNAL FETAL MEDICINE | Facility: CLINIC | Age: 34
End: 2024-05-06
Payer: MEDICAID

## 2024-05-06 VITALS — HEIGHT: 63 IN | WEIGHT: 154.63 LBS | BODY MASS INDEX: 27.4 KG/M2

## 2024-05-06 DIAGNOSIS — O24.111 TYPE 2 DIABETES MELLITUS AFFECTING PREGNANCY IN FIRST TRIMESTER, ANTEPARTUM: Primary | ICD-10-CM

## 2024-05-06 PROCEDURE — 99999PBSHW PR PBB SHADOW TECHNICAL ONLY FILED TO HB: Mod: PBBFAC,,,

## 2024-05-06 PROCEDURE — G0108 DIAB MANAGE TRN  PER INDIV: HCPCS | Mod: PBBFAC,PO | Performed by: DIETITIAN, REGISTERED

## 2024-05-06 NOTE — PROGRESS NOTES
"Diabetes Care Specialist Progress Note  Author: Yodit Corbin RD  Date: 5/6/2024    Program Intake  Reason for Diabetes Program Visit:: Intervention; GDM; EDC 10/22/24.  Type of Intervention:: Individual  Individual: Education  Education: Self-Management Skill Review, Nutrition and Meal Planning  Current diabetes risk level:: moderate  In the last 12 months, have you:: used emergency room services  Was the ER or hospital admission related to diabetes?: Yes  Permission to speak with others about care:: no  Continuous Glucose Monitoring  Patient has CGM: No    Lab Results   Component Value Date    HGBA1C 9.6 (H) 03/27/2024       Clinical    Weight: 70.1 kg (154 lb 10.4 oz)   Height: 5' 3" (160 cm)   Body mass index is 27.4 kg/m².         Problem Review  Reviewed Problem List with Patient: yes  Active comorbidities affecting diabetes self-care.: no    Clinical Assessment  Have you ever experienced hypoglycemia (low blood sugar)?: no (unsure - she gets really hungry between meals, but does not know if it is a low BG or just related to hunger.)  Have you ever experienced hyperglycemia (high blood sugar)?: yes  In the last month, how often have you experienced high blood sugar?: once a day  Are you able to tell when your blood sugar is high?: No (comment)  Have you ever been hospitalized because your blood sugar was high?: no (was in ER for hyperglycemia)    Nutritional Status  Meal Plan 24 Hour Recall: Breakfast, Lunch, Dinner, Snack  Meal Plan 24 Hour Recall - Breakfast: milk, papaya, tortilla with cheese  Meal Plan 24 Hour Recall - Lunch: checken stew, lettuce, 1/2 potato  Meal Plan 24 Hour Recall - Dinner: same as lunch or dinner  Meal Plan 24 Hour Recall - Snack: fruit, drinks water or water with pineapple juice    Additional Social History    Support  Does anyone support you with your diabetes care?: yes    Access to [employer] & Technology  Does the patient have access to any of the following devices or " technologies?: Smart phone    Health Literacy  Preferred Learning Method: Face to Face      Diabetes Self-Management Skills Assessment    Diabetes Disease Process/Treatment Options  Patient/caregiver able to state what happens when someone has diabetes.: somewhat  Patient/caregiver knows what type of diabetes they have.: yes  Diabetes Type : Type II  Patient/caregiver able to identify at least three signs and symptoms of diabetes.: yes  Identified signs and symptoms:: increased thirst, frequent urination  Patient able to identify at least three risk factors for diabetes.: no  Assessment indicates:: Instruction Needed  Area of need?: Yes    Nutrition/Healthy Eating  Challenges to healthy eating:: portion control  Method of carbohydrate measurement:: no method  Patient can identify foods that impact blood sugar.: yes  Patient-identified foods:: sweets, starches (bread, pasta, rice, cereal)  Assessment indicates:: Instruction Needed  Area of need?: Yes    Physical Activity/Exercise  Patient's daily activity level:: lightly active  Patient formally exercises outside of work.: yes  Exercise Type: walking  Intensity: Low  Frequency: four or more times a week  Duration: 30 min  Patient can identify forms of physical activity.: yes  Stated forms of physical activity:: any movement performed by muscles that uses energy  Patient can identify reasons why exercise/physical activity is important in diabetes management.: no  Assessment indicates:: Instruction Needed  Area of need?: Yes    Medications  Patient is able to describe current diabetes management routine.: yes  Diabetes management routine:: oral medications, insulin, diet  Patient is able to identify current diabetes medications, dosages, and appropriate timing of medications.: yes  Patient understands the purpose of the medications taken for diabetes.: yes  Patient reports problems or concerns with current medication regimen.: no  Assessment indicates:: Adequate  understanding  Area of need?: No    Home Blood Glucose Monitoring  Patient states that blood sugar is checked at home daily.: yes  Monitoring Method:: home glucometer  Blood glucose logs:: yes  Blood glucose logs reviewed today?: yes  Assessment indicates:: Instruction Needed  Area of need?: Yes    Acute Complications  Deffered due to:: Time    Chronic Complications  Deferred due to:: Time    Psychosocial/Coping  Deffered due to:: Time      Assessment Summary and Plan    Based on today's diabetes care assessment, the following areas of need were identified:          4/4/2024    12:01 AM   Social   Support No   Access to Mass Media/Tech No   Cognitive/Behavioral Health No   Culture/Religious No   Communication No   Health Literacy No            4/4/2024    12:01 AM   Clinical   Medication Adherence No   Lab Compliance No   Nutritional Status Yes, see care plan below            5/6/2024    12:01 AM   Diabetes Self-Management Skills   Diabetes Disease Process/Treatment Options Yes, reviewed previously   Nutrition/Healthy Eating Yes, see care plan below   Physical Activity/Exercise Yes, reviewed previously   Medication No   Home Blood Glucose Monitoring Yes, Pt's fasting and postprandial BGs are in normal range.          Today's interventions were provided through individual discussion, instruction, and written materials were provided.      Patient verbalized understanding of instruction and written materials.  Pt was able to return back demonstration of instructions today. Patient understood key points, needs reinforcement and further instruction.     Diabetes Self-Management Care Plan:    Today's Diabetes Self-Management Care Plan was developed with Sri's input. Sri has agreed to work toward the following goal(s) to improve his/her overall diabetes control.      There are no recently modified care plans to display for this patient.      Follow Up Plan     Follow up in about 2 months (around 7/6/2024).  Review BG numbers, assess for hypo and hydration, weight. Address any dietary questions; assess dietary changes needed.    Today's care plan and follow up schedule was discussed with patient.  Sri verbalized understanding of the care plan, goals, and agrees to follow up plan.        The patient was encouraged to communicate with his/her health care provider/physician and care team regarding his/her condition(s) and treatment.  I provided the patient with my contact information today and encouraged to contact me via phone or [facility] Patient Portal as needed.     Length of Visit   Total Time: 30 Minutes

## 2024-05-07 ENCOUNTER — PATIENT MESSAGE (OUTPATIENT)
Dept: OTHER | Facility: OTHER | Age: 34
End: 2024-05-07
Payer: MEDICAID

## 2024-05-09 ENCOUNTER — PATIENT MESSAGE (OUTPATIENT)
Dept: MATERNAL FETAL MEDICINE | Facility: CLINIC | Age: 34
End: 2024-05-09
Payer: MEDICAID

## 2024-05-09 DIAGNOSIS — O24.111 TYPE 2 DIABETES MELLITUS AFFECTING PREGNANCY IN FIRST TRIMESTER, ANTEPARTUM: Primary | ICD-10-CM

## 2024-05-13 ENCOUNTER — LAB VISIT (OUTPATIENT)
Dept: LAB | Facility: HOSPITAL | Age: 34
End: 2024-05-13
Attending: OBSTETRICS & GYNECOLOGY
Payer: MEDICAID

## 2024-05-13 ENCOUNTER — ROUTINE PRENATAL (OUTPATIENT)
Dept: OBSTETRICS AND GYNECOLOGY | Facility: CLINIC | Age: 34
End: 2024-05-13
Payer: MEDICAID

## 2024-05-13 VITALS
SYSTOLIC BLOOD PRESSURE: 107 MMHG | WEIGHT: 154.88 LBS | DIASTOLIC BLOOD PRESSURE: 75 MMHG | BODY MASS INDEX: 27.43 KG/M2

## 2024-05-13 DIAGNOSIS — O09.91 SUPERVISION OF HIGH RISK PREGNANCY IN FIRST TRIMESTER: ICD-10-CM

## 2024-05-13 DIAGNOSIS — Z34.92 PRENATAL CARE, SECOND TRIMESTER: ICD-10-CM

## 2024-05-13 DIAGNOSIS — Z34.92 PRENATAL CARE, SECOND TRIMESTER: Primary | ICD-10-CM

## 2024-05-13 DIAGNOSIS — O24.111 TYPE 2 DIABETES MELLITUS AFFECTING PREGNANCY IN FIRST TRIMESTER, ANTEPARTUM: Primary | ICD-10-CM

## 2024-05-13 LAB
BILIRUB SERPL-MCNC: NORMAL MG/DL
BLOOD URINE, POC: NORMAL
CLARITY, POC UA: CLEAR
COLOR, POC UA: YELLOW
GLUCOSE UR QL STRIP: NORMAL
KETONES UR QL STRIP: NORMAL
LEUKOCYTE ESTERASE URINE, POC: NORMAL
NITRITE, POC UA: NORMAL
OHS QRS DURATION: 84 MS
OHS QTC CALCULATION: 434 MS
PH, POC UA: NORMAL
PROTEIN, POC: NORMAL
SPECIFIC GRAVITY, POC UA: NORMAL
T4 FREE SERPL-MCNC: 0.78 NG/DL (ref 0.71–1.51)
TSH SERPL DL<=0.005 MIU/L-ACNC: 3.37 UIU/ML (ref 0.4–4)
UROBILINOGEN, POC UA: NORMAL

## 2024-05-13 PROCEDURE — 84443 ASSAY THYROID STIM HORMONE: CPT | Performed by: OBSTETRICS & GYNECOLOGY

## 2024-05-13 PROCEDURE — 93010 ELECTROCARDIOGRAM REPORT: CPT | Mod: ,,, | Performed by: INTERNAL MEDICINE

## 2024-05-13 PROCEDURE — 84439 ASSAY OF FREE THYROXINE: CPT | Performed by: OBSTETRICS & GYNECOLOGY

## 2024-05-13 PROCEDURE — 82105 ALPHA-FETOPROTEIN SERUM: CPT | Performed by: OBSTETRICS & GYNECOLOGY

## 2024-05-13 PROCEDURE — 93005 ELECTROCARDIOGRAM TRACING: CPT

## 2024-05-13 PROCEDURE — 99999 PR PBB SHADOW E&M-EST. PATIENT-LVL III: CPT | Mod: PBBFAC,,, | Performed by: OBSTETRICS & GYNECOLOGY

## 2024-05-13 PROCEDURE — 84156 ASSAY OF PROTEIN URINE: CPT | Performed by: OBSTETRICS & GYNECOLOGY

## 2024-05-13 PROCEDURE — 99999PBSHW POCT URINE DIPSTICK WITHOUT MICROSCOPE: Mod: PBBFAC,,,

## 2024-05-13 PROCEDURE — 36415 COLL VENOUS BLD VENIPUNCTURE: CPT | Performed by: OBSTETRICS & GYNECOLOGY

## 2024-05-13 PROCEDURE — 81002 URINALYSIS NONAUTO W/O SCOPE: CPT | Mod: PBBFAC,PO | Performed by: OBSTETRICS & GYNECOLOGY

## 2024-05-13 PROCEDURE — 81511 FTL CGEN ABNOR FOUR ANAL: CPT | Performed by: OBSTETRICS & GYNECOLOGY

## 2024-05-13 PROCEDURE — 99213 OFFICE O/P EST LOW 20 MIN: CPT | Mod: PBBFAC,TH,PO | Performed by: OBSTETRICS & GYNECOLOGY

## 2024-05-13 PROCEDURE — 99214 OFFICE O/P EST MOD 30 MIN: CPT | Mod: TH,S$PBB,, | Performed by: OBSTETRICS & GYNECOLOGY

## 2024-05-13 RX ORDER — ASPIRIN 81 MG/1
81 TABLET ORAL DAILY
Qty: 30 TABLET | Refills: 5 | COMMUNITY
Start: 2024-05-13

## 2024-05-13 RX ORDER — LEVOTHYROXINE SODIUM 50 UG/1
50 TABLET ORAL
Qty: 30 TABLET | Refills: 11 | Status: SHIPPED | OUTPATIENT
Start: 2024-05-13 | End: 2024-05-14

## 2024-05-13 NOTE — PROGRESS NOTES
#[id number]  HPI:   34 y.o.          Patient with complaints of cramping across abdomen but her hips are asymmetric--can get maternity belt or use KT tape.. Denies vaginal bleeding. Patient WILL/ DECLINES NIPT/ Quad screen. Anatomy scan scheduled.    ROS:  GENERAL: Denies weight gain or weight loss. Feeling well overall.   HEAD: No headache.   ABDOMEN: No abdominal pain, constipation, diarrhea, nausea, vomiting.   URINARY: No frequency, dysuria, hematuria, or burning on urination.  EXTREMITIES: No swelling     Vitals signs, FHTs, urine dip, and PE findings documented, reviewed and available in OB flow chart.       I spent a total of 15 minutes on the day of the visit.This includes face to face time and non-face to face time preparing to see the patient (eg, review of tests), Obtaining and/or reviewing separately obtained history, Documenting clinical information in the electronic or other health record, Independently interpreting resultsand communicating results to the patient/family/caregiver, or Care coordination.       ASSESSMENT:  16w6d    PLAN:  RTO 4 weeks

## 2024-05-14 ENCOUNTER — PATIENT MESSAGE (OUTPATIENT)
Dept: OTHER | Facility: OTHER | Age: 34
End: 2024-05-14
Payer: MEDICAID

## 2024-05-14 ENCOUNTER — TELEPHONE (OUTPATIENT)
Dept: OBSTETRICS AND GYNECOLOGY | Facility: CLINIC | Age: 34
End: 2024-05-14
Payer: MEDICAID

## 2024-05-14 LAB
CREAT UR-MCNC: 139 MG/DL (ref 15–325)
PROT UR-MCNC: 12 MG/DL (ref 0–15)
PROT/CREAT UR: 0.09 MG/G{CREAT} (ref 0–0.2)

## 2024-05-20 ENCOUNTER — PATIENT MESSAGE (OUTPATIENT)
Dept: OBSTETRICS AND GYNECOLOGY | Facility: CLINIC | Age: 34
End: 2024-05-20
Payer: MEDICAID

## 2024-05-20 LAB
# FETUSES US: NORMAL
2ND TRIMESTER 4 SCREEN PNL SERPL: NEGATIVE
2ND TRIMESTER 4 SCREEN SERPL-IMP: NORMAL
AFP MOM SERPL: 0.81
AFP SERPL-MCNC: 24.5 NG/ML
AGE AT DELIVERY: 34
B-HCG MOM SERPL: 1.35
B-HCG SERPL-ACNC: 39.7 IU/ML
FET TS 21 RISK FROM MAT AGE: NORMAL
GA (DAYS): 6 D
GA (WEEKS): 16 WK
GA METHOD: NORMAL
IDDM PATIENT QL: NORMAL
INHIBIN A MOM SERPL: 1.07
INHIBIN A SERPL-MCNC: 167.7 PG/ML
SMOKING STATUS FTND: NO
TS 18 RISK FETUS: NORMAL
TS 21 RISK FETUS: NORMAL
U ESTRIOL MOM SERPL: 0.78
U ESTRIOL SERPL-MCNC: 1.1 NG/ML

## 2024-05-23 ENCOUNTER — OFFICE VISIT (OUTPATIENT)
Dept: MATERNAL FETAL MEDICINE | Facility: CLINIC | Age: 34
End: 2024-05-23
Payer: MEDICAID

## 2024-05-23 ENCOUNTER — PROCEDURE VISIT (OUTPATIENT)
Dept: MATERNAL FETAL MEDICINE | Facility: CLINIC | Age: 34
End: 2024-05-23
Payer: MEDICAID

## 2024-05-23 VITALS
SYSTOLIC BLOOD PRESSURE: 106 MMHG | HEIGHT: 63 IN | WEIGHT: 156.63 LBS | DIASTOLIC BLOOD PRESSURE: 64 MMHG | BODY MASS INDEX: 27.75 KG/M2

## 2024-05-23 DIAGNOSIS — Z36.89 ENCOUNTER FOR FETAL ANATOMIC SURVEY: ICD-10-CM

## 2024-05-23 DIAGNOSIS — O24.111 TYPE 2 DIABETES MELLITUS AFFECTING PREGNANCY IN FIRST TRIMESTER, ANTEPARTUM: Primary | ICD-10-CM

## 2024-05-23 DIAGNOSIS — Z36.2 ENCOUNTER FOR FOLLOW-UP ULTRASOUND OF FETAL ANATOMY: Primary | ICD-10-CM

## 2024-05-23 PROCEDURE — 3078F DIAST BP <80 MM HG: CPT | Mod: CPTII,,, | Performed by: OBSTETRICS & GYNECOLOGY

## 2024-05-23 PROCEDURE — 99214 OFFICE O/P EST MOD 30 MIN: CPT | Mod: S$PBB,TH,, | Performed by: OBSTETRICS & GYNECOLOGY

## 2024-05-23 PROCEDURE — 1159F MED LIST DOCD IN RCRD: CPT | Mod: CPTII,,, | Performed by: OBSTETRICS & GYNECOLOGY

## 2024-05-23 PROCEDURE — 76811 OB US DETAILED SNGL FETUS: CPT | Mod: PBBFAC | Performed by: OBSTETRICS & GYNECOLOGY

## 2024-05-23 PROCEDURE — 3046F HEMOGLOBIN A1C LEVEL >9.0%: CPT | Mod: CPTII,,, | Performed by: OBSTETRICS & GYNECOLOGY

## 2024-05-23 PROCEDURE — 99213 OFFICE O/P EST LOW 20 MIN: CPT | Mod: PBBFAC,TH | Performed by: OBSTETRICS & GYNECOLOGY

## 2024-05-23 PROCEDURE — 3074F SYST BP LT 130 MM HG: CPT | Mod: CPTII,,, | Performed by: OBSTETRICS & GYNECOLOGY

## 2024-05-23 PROCEDURE — 76811 OB US DETAILED SNGL FETUS: CPT | Mod: 26,S$PBB,, | Performed by: OBSTETRICS & GYNECOLOGY

## 2024-05-23 PROCEDURE — 3008F BODY MASS INDEX DOCD: CPT | Mod: CPTII,,, | Performed by: OBSTETRICS & GYNECOLOGY

## 2024-05-23 PROCEDURE — 99999 PR PBB SHADOW E&M-EST. PATIENT-LVL III: CPT | Mod: PBBFAC,,, | Performed by: OBSTETRICS & GYNECOLOGY

## 2024-05-23 RX ORDER — INSULIN HUMAN 100 [IU]/ML
INJECTION, SUSPENSION SUBCUTANEOUS
Qty: 12 ML | Refills: 2 | Status: SHIPPED | OUTPATIENT
Start: 2024-05-23 | End: 2025-05-23

## 2024-05-23 NOTE — PROGRESS NOTES
"Maternal Fetal Medicine follow up consult    Patient is accompanied by her partner today.    SUBJECTIVE:     Sri Gomez is a 34 y.o.  female with IUP at 18w2d who is seen in follow up consultation by MFM.  Pregnancy complications include:   Problem   Type 2 Diabetes Mellitus Affecting Pregnancy in First Trimester, Antepartum     Previous notes reviewed.   No changes to medical, surgical, family, social, or obstetric history.    Interval history since last MFM visit:   Patient has no complaints today. She is overall feeling well.  Patient denies any contractions/cramping, vaginal bleeding or leakage of fluid.    Medications:  Current Outpatient Medications   Medication Instructions    aspirin (ECOTRIN) 81 mg, Oral, Daily    folic acid (FOLVITE) 400 mcg, Oral, Daily    insulin aspart U-100 (NOVOLOG) 100 unit/mL (3 mL) InPn pen Inject 5 Units into the skin before breakfast AND 6 Units before dinner.    insulin NPH isoph U-100 human (HUMULIN N NPH INSULIN KWIKPEN) 100 unit/mL (3 mL) InPn Inject 22 Units into the skin before breakfast AND 22 Units every evening.    pen needle, diabetic 32 gauge x 5/32" Ndle 1 Syringe, Misc.(Non-Drug; Combo Route), 4 times daily    polyethylene glycol (GLYCOLAX) 17 g, Oral, Daily    senna (SENOKOT) 8.6 mg tablet 1 tablet, Oral, Daily    TRUE METRIX GLUCOSE METER Misc use TO test blood glucose FOUR TIMES DAILY    TRUE METRIX GLUCOSE TEST STRIP Strp 1 strip, Subcutaneous, 4 times daily    TRUEPLUS LANCETS 33 gauge Misc 1 lancet , Subcutaneous, 4 times daily     Care team members:  Darryl - Primary OB     OBJECTIVE:   /64 (BP Location: Left arm, Patient Position: Sitting, BP Method: Large (Automatic))   Ht 5' 3" (1.6 m)   Wt 71.1 kg (156 lb 10.2 oz)   LMP 01/10/2024 (Approximate)   BMI 27.75 kg/m²     Physical Exam:  Deferred    Ultrasound performed. See viewpoint for full ultrasound report.  A detailed fetal anatomic ultrasound examination was performed " for the following high risk indication: T2DM.   No fetal structural malformations are identified; however, fetal imaging is incomplete today.   Fetal size today is consistent with established gestational age ( g).   Transabdominal cervical length is normal.   Placental location is posterior without evidence of previa.    Significant labs/imaging:        ASSESSMENT/PLAN:     34 y.o.  female with IUP at 18w2d    Type 2 diabetes mellitus affecting pregnancy in first trimester, antepartum  Age of Dx:  (in [place] 9 years prior)  Comorbidities: None  Prepregnancy regimen:           Lantus 44u daily  Baseline HgA1c: 12.2% (24)  Last A1c:   Lab Results   Component Value Date    HGBA1C 9.6 (H) 2024     Current regimen (as of 2024):   NPH   Aspart 5/X/6 WM  FETAL ECHO - ordered  MATERNAL ECHO - pending  Dietary counseling - done    Blood sugar review:  - Sugars per above. Control is still suboptimal, specifically for fastings. Post meals at goal.    Recommendations:  Given sugars values above we will adjust her regimen:  NPH   Aspart 5/x/6 WM   Continue to check BS 4x daily  Hypoglycemia precautions were once again reviewed.  F/U check in 2 weeks virtually - scheduled  Twice weekly BPP/ NST + AFV starting at 32 weeks    Delivery timing:  Per prior recs      FOLLOW UP:   A follow up ultrasound will be made for 4 weeks. A follow up MFM MD visit will be made for same day.  Patient to send her BS log in 2 weeks.      The patient was given an opportunity to ask questions about the management of her high risk pregnancy problems. She expressed an understanding of and agreement to the above impression and plan. All questions were answered to her satisfaction.        Robinson Joiner MD   Maternal-Fetal Medicine      Electronically Signed by Robinson Joiner May 23, 2024

## 2024-05-23 NOTE — ASSESSMENT & PLAN NOTE
Age of Dx: 2015 (in [place] 9 years prior)  Comorbidities: None  Prepregnancy regimen:           Lantus 44u daily  Baseline HgA1c: 12.2% (2/20/24)  Last A1c:   Lab Results   Component Value Date    HGBA1C 9.6 (H) 03/27/2024     Current regimen (as of 05/23/2024):   NPH 22/22  Aspart 5/X/6 WM  FETAL ECHO - ordered  MATERNAL ECHO - pending  Dietary counseling - done    Blood sugar review:  - Sugars per above. Control is still suboptimal, specifically for fastings. Post meals at goal.    Recommendations:  Given sugars values above we will adjust her regimen:  NPH 22/26  Aspart 5/x/6 WM   Continue to check BS 4x daily  Hypoglycemia precautions were once again reviewed.  F/U check in 2 weeks virtually - scheduled  Twice weekly BPP/ NST + AFV starting at 32 weeks    Delivery timing:  Per prior recs

## 2024-06-04 ENCOUNTER — PATIENT MESSAGE (OUTPATIENT)
Dept: OTHER | Facility: OTHER | Age: 34
End: 2024-06-04
Payer: MEDICAID

## 2024-06-08 DIAGNOSIS — O24.111 TYPE 2 DIABETES MELLITUS AFFECTING PREGNANCY IN FIRST TRIMESTER, ANTEPARTUM: ICD-10-CM

## 2024-06-10 RX ORDER — PEN NEEDLE, DIABETIC 30 GX3/16"
1 NEEDLE, DISPOSABLE MISCELLANEOUS 4 TIMES DAILY
Qty: 200 EACH | Refills: 3 | Status: SHIPPED | OUTPATIENT
Start: 2024-06-10

## 2024-06-17 ENCOUNTER — ROUTINE PRENATAL (OUTPATIENT)
Dept: OBSTETRICS AND GYNECOLOGY | Facility: CLINIC | Age: 34
End: 2024-06-17
Payer: MEDICAID

## 2024-06-17 ENCOUNTER — LAB VISIT (OUTPATIENT)
Dept: LAB | Facility: HOSPITAL | Age: 34
End: 2024-06-17
Attending: OBSTETRICS & GYNECOLOGY
Payer: MEDICAID

## 2024-06-17 VITALS
DIASTOLIC BLOOD PRESSURE: 71 MMHG | SYSTOLIC BLOOD PRESSURE: 105 MMHG | WEIGHT: 157.88 LBS | BODY MASS INDEX: 27.96 KG/M2

## 2024-06-17 DIAGNOSIS — Z34.92 PRENATAL CARE, SECOND TRIMESTER: ICD-10-CM

## 2024-06-17 DIAGNOSIS — Z3A.21 21 WEEKS GESTATION OF PREGNANCY: Primary | ICD-10-CM

## 2024-06-17 LAB
ALBUMIN SERPL BCP-MCNC: 3.1 G/DL (ref 3.5–5.2)
ALP SERPL-CCNC: 107 U/L (ref 55–135)
ALT SERPL W/O P-5'-P-CCNC: 9 U/L (ref 10–44)
ANION GAP SERPL CALC-SCNC: 10 MMOL/L (ref 8–16)
AST SERPL-CCNC: 12 U/L (ref 10–40)
BASOPHILS # BLD AUTO: 0.04 K/UL (ref 0–0.2)
BASOPHILS NFR BLD: 0.4 % (ref 0–1.9)
BILIRUB SERPL-MCNC: 0.2 MG/DL (ref 0.1–1)
BILIRUB SERPL-MCNC: ABNORMAL MG/DL
BLOOD URINE, POC: ABNORMAL
BUN SERPL-MCNC: 10 MG/DL (ref 6–20)
CALCIUM SERPL-MCNC: 9.2 MG/DL (ref 8.7–10.5)
CHLORIDE SERPL-SCNC: 109 MMOL/L (ref 95–110)
CLARITY, POC UA: CLEAR
CO2 SERPL-SCNC: 16 MMOL/L (ref 23–29)
COLOR, POC UA: YELLOW
CREAT SERPL-MCNC: 0.7 MG/DL (ref 0.5–1.4)
DIFFERENTIAL METHOD BLD: ABNORMAL
EOSINOPHIL # BLD AUTO: 0.2 K/UL (ref 0–0.5)
EOSINOPHIL NFR BLD: 1.9 % (ref 0–8)
ERYTHROCYTE [DISTWIDTH] IN BLOOD BY AUTOMATED COUNT: 14.6 % (ref 11.5–14.5)
EST. GFR  (NO RACE VARIABLE): >60 ML/MIN/1.73 M^2
ESTIMATED AVG GLUCOSE: 131 MG/DL (ref 68–131)
GLUCOSE SERPL-MCNC: 128 MG/DL (ref 70–110)
GLUCOSE UR QL STRIP: 250
HBA1C MFR BLD: 6.2 % (ref 4–5.6)
HCT VFR BLD AUTO: 34.5 % (ref 37–48.5)
HGB BLD-MCNC: 11.4 G/DL (ref 12–16)
IMM GRANULOCYTES # BLD AUTO: 0.09 K/UL (ref 0–0.04)
IMM GRANULOCYTES NFR BLD AUTO: 0.8 % (ref 0–0.5)
KETONES UR QL STRIP: ABNORMAL
LEUKOCYTE ESTERASE URINE, POC: ABNORMAL
LYMPHOCYTES # BLD AUTO: 2.4 K/UL (ref 1–4.8)
LYMPHOCYTES NFR BLD: 21.7 % (ref 18–48)
MCH RBC QN AUTO: 27 PG (ref 27–31)
MCHC RBC AUTO-ENTMCNC: 33 G/DL (ref 32–36)
MCV RBC AUTO: 82 FL (ref 82–98)
MONOCYTES # BLD AUTO: 0.6 K/UL (ref 0.3–1)
MONOCYTES NFR BLD: 5.6 % (ref 4–15)
NEUTROPHILS # BLD AUTO: 7.6 K/UL (ref 1.8–7.7)
NEUTROPHILS NFR BLD: 69.6 % (ref 38–73)
NITRITE, POC UA: ABNORMAL
NRBC BLD-RTO: 0 /100 WBC
PH, POC UA: 5
PLATELET # BLD AUTO: 404 K/UL (ref 150–450)
PMV BLD AUTO: 10.1 FL (ref 9.2–12.9)
POTASSIUM SERPL-SCNC: 4.1 MMOL/L (ref 3.5–5.1)
PROT SERPL-MCNC: 7.3 G/DL (ref 6–8.4)
PROTEIN, POC: ABNORMAL
RBC # BLD AUTO: 4.23 M/UL (ref 4–5.4)
SODIUM SERPL-SCNC: 135 MMOL/L (ref 136–145)
SPECIFIC GRAVITY, POC UA: 1.02
UROBILINOGEN, POC UA: ABNORMAL
WBC # BLD AUTO: 10.9 K/UL (ref 3.9–12.7)

## 2024-06-17 PROCEDURE — 83036 HEMOGLOBIN GLYCOSYLATED A1C: CPT | Performed by: OBSTETRICS & GYNECOLOGY

## 2024-06-17 PROCEDURE — 99213 OFFICE O/P EST LOW 20 MIN: CPT | Mod: PBBFAC,TH,PO | Performed by: OBSTETRICS & GYNECOLOGY

## 2024-06-17 PROCEDURE — 99999 PR PBB SHADOW E&M-EST. PATIENT-LVL III: CPT | Mod: PBBFAC,,, | Performed by: OBSTETRICS & GYNECOLOGY

## 2024-06-17 PROCEDURE — 81002 URINALYSIS NONAUTO W/O SCOPE: CPT | Mod: PBBFAC,PO | Performed by: OBSTETRICS & GYNECOLOGY

## 2024-06-17 PROCEDURE — 36415 COLL VENOUS BLD VENIPUNCTURE: CPT | Performed by: OBSTETRICS & GYNECOLOGY

## 2024-06-17 PROCEDURE — 80053 COMPREHEN METABOLIC PANEL: CPT | Performed by: OBSTETRICS & GYNECOLOGY

## 2024-06-17 PROCEDURE — 85025 COMPLETE CBC W/AUTO DIFF WBC: CPT | Performed by: OBSTETRICS & GYNECOLOGY

## 2024-06-17 PROCEDURE — 99999PBSHW POCT URINE DIPSTICK WITHOUT MICROSCOPE: Mod: PBBFAC,,,

## 2024-06-17 RX ORDER — FAMOTIDINE 40 MG/1
40 TABLET, FILM COATED ORAL NIGHTLY
Qty: 30 TABLET | Refills: 5 | Status: SHIPPED | OUTPATIENT
Start: 2024-06-17

## 2024-06-17 RX ORDER — TERCONAZOLE 4 MG/G
1 CREAM VAGINAL NIGHTLY
Qty: 45 G | Refills: 1 | Status: SHIPPED | OUTPATIENT
Start: 2024-06-17 | End: 2024-06-24

## 2024-06-17 NOTE — PROGRESS NOTES
Poncho Dotson #[id number]    HPI:   34 y.o.        Patient with pain in lower abdomen pelvic area (has asymmetry asis) and epigastric pain with reflux (pepcid sent to pharmacy today). Blood sugars reviewed.Has discharge with green clumps (Rx sent) Feeling tired and dizzy--discussed eating protein and will check CBC. no complaints. Denies vaginal bleeding or cramping.  Good FM. Ultrasound reviewed and consents signed. Discussed with patient having glucose testing for gestational diabetes preformed between 24-28 weeks.        ROS:  GENERAL: Denies weight gain or weight loss. Feeling well overall. +FM.   HEAD: No headache.   ABDOMEN: No abdominal pain, constipation, diarrhea, nausea, vomiting.   URINARY: No frequency, dysuria, hematuria, or burning on urination.  EXTREMITIES: No swelling     Vitals signs, FHTs, urine dip, and PE findings documented, reviewed and available in OB flow chart.       I spent a total of 15 minutes on the day of the visit.This includes face to face time and non-face to face time preparing to see the patient (eg, review of tests), Obtaining and/or reviewing separately obtained history, Documenting clinical information in the electronic or other health record, Independently interpreting resultsand communicating results to the patient/family/caregiver, or Care coordination.       ASSESSMENT:  21w6d  Type 2 DM-- insulin followed by Dr. Joiner--BS good today  Discussed protein when needed  Yeast infection--rx sent  Reflux--rx sent     PLAN:   RTO 4 weeks

## 2024-06-18 ENCOUNTER — PATIENT MESSAGE (OUTPATIENT)
Dept: OBSTETRICS AND GYNECOLOGY | Facility: HOSPITAL | Age: 34
End: 2024-06-18
Payer: MEDICAID

## 2024-06-20 ENCOUNTER — OFFICE VISIT (OUTPATIENT)
Dept: MATERNAL FETAL MEDICINE | Facility: CLINIC | Age: 34
End: 2024-06-20
Payer: MEDICAID

## 2024-06-20 ENCOUNTER — PROCEDURE VISIT (OUTPATIENT)
Dept: MATERNAL FETAL MEDICINE | Facility: CLINIC | Age: 34
End: 2024-06-20
Payer: MEDICAID

## 2024-06-20 ENCOUNTER — PATIENT MESSAGE (OUTPATIENT)
Dept: MATERNAL FETAL MEDICINE | Facility: CLINIC | Age: 34
End: 2024-06-20

## 2024-06-20 ENCOUNTER — OFFICE VISIT (OUTPATIENT)
Dept: PEDIATRIC CARDIOLOGY | Facility: CLINIC | Age: 34
End: 2024-06-20
Payer: MEDICAID

## 2024-06-20 ENCOUNTER — CLINICAL SUPPORT (OUTPATIENT)
Dept: PEDIATRIC CARDIOLOGY | Facility: CLINIC | Age: 34
End: 2024-06-20
Payer: MEDICAID

## 2024-06-20 VITALS
SYSTOLIC BLOOD PRESSURE: 103 MMHG | BODY MASS INDEX: 28 KG/M2 | HEIGHT: 63 IN | SYSTOLIC BLOOD PRESSURE: 103 MMHG | BODY MASS INDEX: 28 KG/M2 | HEIGHT: 63 IN | DIASTOLIC BLOOD PRESSURE: 62 MMHG | DIASTOLIC BLOOD PRESSURE: 62 MMHG | WEIGHT: 158.06 LBS | HEART RATE: 103 BPM | WEIGHT: 158.06 LBS

## 2024-06-20 DIAGNOSIS — O24.111 TYPE 2 DIABETES MELLITUS AFFECTING PREGNANCY IN FIRST TRIMESTER, ANTEPARTUM: Primary | ICD-10-CM

## 2024-06-20 DIAGNOSIS — Z36.89 ENCOUNTER FOR ULTRASOUND TO ASSESS FETAL GROWTH: Primary | ICD-10-CM

## 2024-06-20 DIAGNOSIS — O24.111 TYPE 2 DIABETES MELLITUS AFFECTING PREGNANCY IN FIRST TRIMESTER, ANTEPARTUM: ICD-10-CM

## 2024-06-20 DIAGNOSIS — Z36.2 ENCOUNTER FOR FOLLOW-UP ULTRASOUND OF FETAL ANATOMY: ICD-10-CM

## 2024-06-20 PROCEDURE — 76827 ECHO EXAM OF FETAL HEART: CPT | Mod: PBBFAC | Performed by: PEDIATRICS

## 2024-06-20 PROCEDURE — 99213 OFFICE O/P EST LOW 20 MIN: CPT | Mod: PBBFAC,27,TH | Performed by: OBSTETRICS & GYNECOLOGY

## 2024-06-20 PROCEDURE — 99999 PR PBB SHADOW E&M-EST. PATIENT-LVL III: CPT | Mod: PBBFAC,,, | Performed by: OBSTETRICS & GYNECOLOGY

## 2024-06-20 PROCEDURE — 93325 DOPPLER ECHO COLOR FLOW MAPG: CPT | Mod: PBBFAC | Performed by: PEDIATRICS

## 2024-06-20 PROCEDURE — 99999 PR PBB SHADOW E&M-EST. PATIENT-LVL III: CPT | Mod: PBBFAC,,, | Performed by: PEDIATRICS

## 2024-06-20 PROCEDURE — 76816 OB US FOLLOW-UP PER FETUS: CPT | Mod: PBBFAC | Performed by: OBSTETRICS & GYNECOLOGY

## 2024-06-20 PROCEDURE — 76825 ECHO EXAM OF FETAL HEART: CPT | Mod: PBBFAC | Performed by: PEDIATRICS

## 2024-06-20 PROCEDURE — 99213 OFFICE O/P EST LOW 20 MIN: CPT | Mod: PBBFAC,25 | Performed by: PEDIATRICS

## 2024-06-20 RX ORDER — BLOOD-GLUCOSE SENSOR
1 EACH MISCELLANEOUS CONTINUOUS
Qty: 1 EACH | Refills: 12 | Status: SHIPPED | OUTPATIENT
Start: 2024-06-20 | End: 2025-06-20

## 2024-06-20 NOTE — PROGRESS NOTES
"  Maternal Fetal Medicine Follow Up Consult    SUBJECTIVE:   Sri Gomez is a 34 y.o.  female with IUP at 22w2d who is seen in follow up consultation by MFM.  Pregnancy complications include:   Problem   Type 2 Diabetes Mellitus Affecting Pregnancy in First Trimester, Antepartum       Interval history since last MFM visit: being treated for yeast infection. Round ligament pain    Previous notes reviewed. No changes to medical, surgical, family, social, or obstetric history.  Medications:  Current Outpatient Medications   Medication Instructions    aspirin (ECOTRIN) 81 mg, Oral, Daily    famotidine (PEPCID) 40 mg, Oral, Nightly    folic acid (FOLVITE) 400 mcg, Oral, Daily    insulin aspart U-100 (NOVOLOG) 100 unit/mL (3 mL) InPn pen Inject 5 Units into the skin before breakfast AND 6 Units before dinner.    insulin NPH isoph U-100 human (HUMULIN N NPH INSULIN KWIKPEN) 100 unit/mL (3 mL) InPn Inject 22 Units into the skin before breakfast AND 26 Units every evening.    pen needle, diabetic 32 gauge x 5/32" Ndle 1 Syringe, Misc.(Non-Drug; Combo Route), 4 times daily    polyethylene glycol (GLYCOLAX) 17 g, Oral, Daily    senna (SENOKOT) 8.6 mg tablet 1 tablet, Oral, Daily    terconazole (TERAZOL 7) 0.4 % Crea 1 applicator, Vaginal, Nightly    TRUE METRIX GLUCOSE METER Misc use TO test blood glucose FOUR TIMES DAILY    TRUE METRIX GLUCOSE TEST STRIP Strp 1 strip, Subcutaneous, 4 times daily    TRUEPLUS LANCETS 33 gauge Misc 1 lancet , Subcutaneous, 4 times daily       Care team members:  Darryl - Primary OB  Rhys - MFM    OBJECTIVE:   /62 (BP Location: Left arm, Patient Position: Sitting, BP Method: Large (Automatic))   Ht 5' 2.99" (1.6 m)   Wt 71.7 kg (158 lb 1.1 oz)   LMP 01/10/2024 (Approximate)   BMI 28.01 kg/m²     Ultrasound performed. See viewpoint for full ultrasound report.  The fetal anatomic survey was completed today, and no fetal structural abnormalities were identified " of the structures imaged today.   Interval fetal growth has been normal, and the AFV is normal.     Significant labs/imaging:  Quad screen negative  See media for recent glucose log - nearly 100% readings at goal  ASSESSMENT/PLAN:   34 y.o.  female with IUP at 22w2d    Type 2 diabetes mellitus affecting pregnancy in first trimester, antepartum  Age of Dx:  (in [place] 9 years prior)  Comorbidities: None  Prepregnancy regimen:           Lantus 44u daily  Baseline HgA1c: 12.2% (24)  Last A1c:   Lab Results   Component Value Date    HGBA1C 6.2 (H) 2024     Blood sugar review of last 14 days  - Nearly 100% of all values below fasting and postprandial thresholds. No hypoglycemic events.     Current regimen (as of 2024):   NPH /  Aspart 5/X/6 WM  FETAL ECHO - completed today and wnl  MATERNAL ECHO - pending  Dietary counseling - done    Blood sugar review:  - Sugars per above. Control is still suboptimal, specifically for fastings. Post meals at goal.    Recommendations:  No changes to insulin regimen  NPH   Aspart 5/x/6 WM   Continue to check BS 4x daily  Hypoglycemia precautions were once again reviewed.  F/U check in 2 weeks virtually - scheduled  Twice weekly BPP/ NST + AFV starting at 32 weeks  Needs enrollment in connected mom or home BP cuff    Delivery timing:  Per prior recs      F/u in 4 weeks for growth US/MD BRIANA Lewis MD  Maternal Fetal Medicine Fellow   PGY-5

## 2024-06-20 NOTE — PROGRESS NOTES
"[facility] Maternal Fetal Medicine Fetal Cardiology Clinic    Today, I had the pleasure of evaluating Sri Gomez who is now 34 y.o. and carrying her first pregnancy at 22 2/7 weeks gestation with an LETI of 10/22/24. She was referred for evaluation of the fetal heart due to personal diagnosis of Tyle 2 diabetes (most recent HgA1c 6.2 on 24, 9.6 on 3/27/24). No abnormalities noted on the anatomy scan with suboptimal views of the fetal heart during the anatomy scan.      She is carrying a male fetus, named Myke.  She has felt the baby move.       Obstetric History:    .  Her OB history is otherwise unremarkable.     Past Medical History:   Diagnosis Date    Diabetes mellitus          Current Outpatient Medications:     aspirin (ECOTRIN) 81 MG EC tablet, Take 1 tablet (81 mg total) by mouth once daily., Disp: 30 tablet, Rfl: 5    famotidine (PEPCID) 40 MG tablet, Take 1 tablet (40 mg total) by mouth every evening., Disp: 30 tablet, Rfl: 5    folic acid (FOLVITE) 400 MCG tablet, Take 400 mcg by mouth once daily., Disp: , Rfl:     insulin aspart U-100 (NOVOLOG) 100 unit/mL (3 mL) InPn pen, Inject 5 Units into the skin before breakfast AND 6 Units before dinner., Disp: 9 mL, Rfl: 4    insulin NPH isoph U-100 human (HUMULIN N NPH INSULIN KWIKPEN) 100 unit/mL (3 mL) InPn, Inject 22 Units into the skin before breakfast AND 26 Units every evening., Disp: 12 mL, Rfl: 2    pen needle, diabetic 32 gauge x 5/32" Ndle, 1 Syringe by Misc.(Non-Drug; Combo Route) route 4 (four) times daily., Disp: 200 each, Rfl: 3    polyethylene glycol (GLYCOLAX) 17 gram PwPk, Take 17 g by mouth once daily. (Patient not taking: Reported on 2024), Disp: 10 each, Rfl: 0    senna (SENOKOT) 8.6 mg tablet, Take 1 tablet by mouth once daily., Disp: 30 tablet, Rfl: 1    terconazole (TERAZOL 7) 0.4 % Crea, Place 1 applicator vaginally every evening. for 7 days, Disp: 45 g, Rfl: 1    TRUE METRIX GLUCOSE METER Misc, use TO " test blood glucose FOUR TIMES DAILY, Disp: , Rfl:     TRUE METRIX GLUCOSE TEST STRIP Strp, Inject 1 strip into the skin 4 (four) times daily., Disp: 200 each, Rfl: 2    TRUEPLUS LANCETS 33 gauge Misc, Inject 1 lancet  into the skin 4 (four) times daily., Disp: 200 each, Rfl: 3     Review of patient's allergies indicates:  No Known Allergies    Family History: Negative for congenital heart disease, early coronary artery disease, sudden unexplained death, connective tissues disorders, genetic syndromes, multiple miscarriages or other congenital anomalies.    Social History: Ms. Sri Gomez is single.       FETAL ECHOCARDIOGRAM (summary):  Normal fetal echocardiogram.    (Full report in electronic medical record)    Impression:  Single active male fetus at 22 2/7 wga.  Normal fetal echocardiogram.      I reviewed the concerns related to maternal diabetes. With normal fetal anatomy and function at this stage of development, the primary concern is the possibility of developing hypertrophic cardiomyopathy if there is difficulty controlling the maternal glucose and insulin. I carefully reviewed the possibility of this problem in the  period, the problems that it could cause and the high probability that it would regress after the infant is no longer exposed to the maternal glucose and insulin. I also reinforced the need to regulate the insulin and glucose carefully during the remainder of the pregnancy to minimize the risk for this complication.    Todays fetal echocardiogram is normal, within the limitations of fetal echocardiography.  I discussed with her that fetal echocardiography is insufficiently sensitive to rule out all septal defects, anomalies of pulmonary and systemic veins, arch anomalies, and some valvar abnormalities, nor can it ensure that the ductus arteriosus and foramen ovale will spontaneously close.     Recommendations:  Location, timing, and mode of delivery will be  determined by the obstetrical team.  She does not require further follow-up in the fetal echocardiography clinic, but I would be happy to see her again if additional questions or concerns arise.    Should there be any concerns about the baby's heart after birth, a post-bruna echocardiogram and cardiology consultation are recommended.           The above information was discussed in detail including the use of diagrams, 30 minutes were used for the evaluation with half of that time in discussion and counseling.

## 2024-06-20 NOTE — ASSESSMENT & PLAN NOTE
Age of Dx: 2015 (in [place] 9 years prior)  Comorbidities: None  Prepregnancy regimen:           Lantus 44u daily  Baseline HgA1c: 12.2% (2/20/24)  Last A1c:   Lab Results   Component Value Date    HGBA1C 6.2 (H) 06/17/2024     Blood sugar review of last 14 days  - Nearly 100% of all values below fasting and postprandial thresholds. No hypoglycemic events.     Current regimen (as of 06/20/2024):   NPH 22/26  Aspart 5/X/6 WM  FETAL ECHO - completed today and wnl  MATERNAL ECHO - pending  Dietary counseling - done    Blood sugar review:  - Sugars per above. Control is still suboptimal, specifically for fastings. Post meals at goal.    Recommendations:  No changes to insulin regimen  NPH 22/26  Aspart 5/x/6 WM   Continue to check BS 4x daily  Hypoglycemia precautions were once again reviewed.  F/U check in 2 weeks virtually - scheduled  Twice weekly BPP/ NST + AFV starting at 32 weeks  Needs enrollment in connected mom or home BP cuff    Delivery timing:  Per prior recs

## 2024-07-02 ENCOUNTER — PATIENT MESSAGE (OUTPATIENT)
Dept: OTHER | Facility: OTHER | Age: 34
End: 2024-07-02
Payer: MEDICAID

## 2024-07-11 ENCOUNTER — HOSPITAL ENCOUNTER (EMERGENCY)
Facility: OTHER | Age: 34
Discharge: HOME | End: 2024-07-11
Attending: OBSTETRICS & GYNECOLOGY
Payer: MEDICAID

## 2024-07-11 VITALS
WEIGHT: 163.13 LBS | TEMPERATURE: 98 F | BODY MASS INDEX: 28.9 KG/M2 | OXYGEN SATURATION: 100 % | DIASTOLIC BLOOD PRESSURE: 63 MMHG | RESPIRATION RATE: 20 BRPM | SYSTOLIC BLOOD PRESSURE: 110 MMHG | HEART RATE: 89 BPM | HEIGHT: 63 IN

## 2024-07-11 DIAGNOSIS — R42 DIZZINESS: Primary | ICD-10-CM

## 2024-07-11 DIAGNOSIS — R55 POSTURAL DIZZINESS WITH PRESYNCOPE: ICD-10-CM

## 2024-07-11 DIAGNOSIS — Z3A.25 25 WEEKS GESTATION OF PREGNANCY: ICD-10-CM

## 2024-07-11 DIAGNOSIS — B37.31 VAGINAL YEAST INFECTION: ICD-10-CM

## 2024-07-11 DIAGNOSIS — R42 POSTURAL DIZZINESS WITH PRESYNCOPE: ICD-10-CM

## 2024-07-11 DIAGNOSIS — R04.0 NOSEBLEED: ICD-10-CM

## 2024-07-11 LAB
ALBUMIN SERPL BCP-MCNC: 2.8 G/DL (ref 3.5–5.2)
ALP SERPL-CCNC: 100 U/L (ref 55–135)
ALT SERPL W/O P-5'-P-CCNC: 11 U/L (ref 10–44)
ANION GAP SERPL CALC-SCNC: 6 MMOL/L (ref 8–16)
AST SERPL-CCNC: 10 U/L (ref 10–40)
BACTERIA HYPHAE, POC: NEGATIVE
BASOPHILS # BLD AUTO: 0.03 K/UL (ref 0–0.2)
BASOPHILS NFR BLD: 0.3 % (ref 0–1.9)
BILIRUB SERPL-MCNC: 0.2 MG/DL (ref 0.1–1)
BILIRUB SERPL-MCNC: NORMAL MG/DL
BLOOD URINE, POC: NORMAL
BUN SERPL-MCNC: 9 MG/DL (ref 6–20)
CALCIUM SERPL-MCNC: 8.5 MG/DL (ref 8.7–10.5)
CHLORIDE SERPL-SCNC: 109 MMOL/L (ref 95–110)
CO2 SERPL-SCNC: 21 MMOL/L (ref 23–29)
COLOR, POC UA: NORMAL
CREAT SERPL-MCNC: 0.6 MG/DL (ref 0.5–1.4)
DIFFERENTIAL METHOD BLD: ABNORMAL
EOSINOPHIL # BLD AUTO: 0.2 K/UL (ref 0–0.5)
EOSINOPHIL NFR BLD: 2 % (ref 0–8)
ERYTHROCYTE [DISTWIDTH] IN BLOOD BY AUTOMATED COUNT: 14.7 % (ref 11.5–14.5)
EST. GFR  (NO RACE VARIABLE): >60 ML/MIN/1.73 M^2
FERN TEST: NEGATIVE
GARDNERELLA VAGINALIS: NEGATIVE
GLUCOSE SERPL-MCNC: 108 MG/DL (ref 70–110)
GLUCOSE UR QL STRIP: NORMAL
HCT VFR BLD AUTO: 31.3 % (ref 37–48.5)
HGB BLD-MCNC: 10.2 G/DL (ref 12–16)
IMM GRANULOCYTES # BLD AUTO: 0.06 K/UL (ref 0–0.04)
IMM GRANULOCYTES NFR BLD AUTO: 0.6 % (ref 0–0.5)
KETONES UR QL STRIP: NORMAL
LEUKOCYTE ESTERASE URINE, POC: NORMAL
LYMPHOCYTES # BLD AUTO: 2.4 K/UL (ref 1–4.8)
LYMPHOCYTES NFR BLD: 23.1 % (ref 18–48)
MAGNESIUM SERPL-MCNC: 1.8 MG/DL (ref 1.6–2.6)
MCH RBC QN AUTO: 26.4 PG (ref 27–31)
MCHC RBC AUTO-ENTMCNC: 32.6 G/DL (ref 32–36)
MCV RBC AUTO: 81 FL (ref 82–98)
MONOCYTES # BLD AUTO: 0.6 K/UL (ref 0.3–1)
MONOCYTES NFR BLD: 5.8 % (ref 4–15)
NEUTROPHILS # BLD AUTO: 7.2 K/UL (ref 1.8–7.7)
NEUTROPHILS NFR BLD: 68.2 % (ref 38–73)
NITRITE, POC UA: NORMAL
NRBC BLD-RTO: 0 /100 WBC
OTHER MICROSC. OBSERVATIONS: ABNORMAL
PH, BODY FLUID: NEGATIVE
PH, POC UA: NORMAL
PHOSPHATE SERPL-MCNC: 4.1 MG/DL (ref 2.7–4.5)
PLATELET # BLD AUTO: 380 K/UL (ref 150–450)
PMV BLD AUTO: 10.1 FL (ref 9.2–12.9)
POC BACTERIAL VAGINOSIS: NEGATIVE
POC CLUE CELLS: NEGATIVE
POCT GLUCOSE: 110 MG/DL (ref 70–110)
POTASSIUM SERPL-SCNC: 3.6 MMOL/L (ref 3.5–5.1)
PROT SERPL-MCNC: 6.7 G/DL (ref 6–8.4)
PROTEIN, POC: NORMAL
RBC # BLD AUTO: 3.87 M/UL (ref 4–5.4)
SODIUM SERPL-SCNC: 136 MMOL/L (ref 136–145)
SPECIFIC GRAVITY, POC UA: NORMAL
TRICHOMONAS, POC: NEGATIVE
UROBILINOGEN, POC UA: NORMAL
WBC # BLD AUTO: 10.54 K/UL (ref 3.9–12.7)
YEAST WET PREP: POSITIVE
YEAST, POC: POSITIVE

## 2024-07-11 PROCEDURE — 83735 ASSAY OF MAGNESIUM: CPT

## 2024-07-11 PROCEDURE — 93010 ELECTROCARDIOGRAM REPORT: CPT | Mod: ,,, | Performed by: INTERNAL MEDICINE

## 2024-07-11 PROCEDURE — 80053 COMPREHEN METABOLIC PANEL: CPT

## 2024-07-11 PROCEDURE — 93005 ELECTROCARDIOGRAM TRACING: CPT | Mod: 59

## 2024-07-11 PROCEDURE — 99284 EMERGENCY DEPT VISIT MOD MDM: CPT | Mod: 25,,, | Performed by: OBSTETRICS & GYNECOLOGY

## 2024-07-11 PROCEDURE — 59025 FETAL NON-STRESS TEST: CPT

## 2024-07-11 PROCEDURE — 84100 ASSAY OF PHOSPHORUS: CPT

## 2024-07-11 PROCEDURE — 81002 URINALYSIS NONAUTO W/O SCOPE: CPT

## 2024-07-11 PROCEDURE — 82962 GLUCOSE BLOOD TEST: CPT

## 2024-07-11 PROCEDURE — 25000003 PHARM REV CODE 250

## 2024-07-11 PROCEDURE — 85025 COMPLETE CBC W/AUTO DIFF WBC: CPT

## 2024-07-11 PROCEDURE — 59025 FETAL NON-STRESS TEST: CPT | Mod: 26,,, | Performed by: OBSTETRICS & GYNECOLOGY

## 2024-07-11 PROCEDURE — 99284 EMERGENCY DEPT VISIT MOD MDM: CPT | Mod: 25

## 2024-07-11 RX ORDER — TERCONAZOLE 8 MG/G
1 CREAM VAGINAL NIGHTLY
Qty: 20 G | Refills: 0 | Status: SHIPPED | OUTPATIENT
Start: 2024-07-11

## 2024-07-11 RX ORDER — SIMETHICONE 80 MG
1 TABLET,CHEWABLE ORAL ONCE
Status: COMPLETED | OUTPATIENT
Start: 2024-07-11 | End: 2024-07-11

## 2024-07-11 RX ORDER — ACETAMINOPHEN 500 MG
1000 TABLET ORAL ONCE
Status: COMPLETED | OUTPATIENT
Start: 2024-07-11 | End: 2024-07-11

## 2024-07-11 RX ADMIN — ACETAMINOPHEN 1000 MG: 500 TABLET ORAL at 02:07

## 2024-07-11 RX ADMIN — SIMETHICONE 80 MG: 80 TABLET, CHEWABLE ORAL at 02:07

## 2024-07-11 NOTE — ED PROVIDER NOTES
Encounter Date: 2024       History     Chief Complaint   Patient presents with    Headache     Headache, dizziness and right ear pain with bloody discharge x 4 days.     Abdominal Pain     Abd pain x 4 days. +fetal movement felt today. 25w2d. Denies vaginal bleeding.      Sri Gomez is a 34 y.o. F at 25w2d presents complaining of headache, dizziness, thin yellow discharge, and abdominal pain. Her headache is rated a 5-6/10, and she has not tried any medication for the headache. In regards to her dizziness, patient notes she mostly feels dizzy when she is at work. Patient works in a hotel kitchen. She feels her abdominal pain is related to trapped gas and denies any changes in bowel habits, nausea, vomiting. Additional abdominal pain is located in the bilateral inguinal regions which a prior doctor has recommended a belly band for, but patient has not tried the belly band. She has been having thin yellow discharge for 2-3 days for which she has concern for rupture of membranes. Patient also complains of ear pain and nosebleed since yesterday. She has not taken any medications for any of her ailments. Denies any ear discharge, history of nosebleeds, fever, chills, uterine contractions, vaginal bleeding, recent illness. She reports excellent fetal movement.     This IUP is complicated by GDM on insulin.        Review of patient's allergies indicates:  No Known Allergies  Past Medical History:   Diagnosis Date    Diabetes mellitus      No past surgical history on file.  Family History   Problem Relation Name Age of Onset    Diabetes Paternal Grandmother      Hypertension Paternal Grandmother      Hypertension Father      Diabetes Father      Hypertension Mother      Diabetes Mother       Social History     Tobacco Use    Smoking status: Never    Smokeless tobacco: Never   Substance Use Topics    Alcohol use: Not Currently    Drug use: Never     Review of Systems   Constitutional:   Negative for chills and fever.   HENT:  Positive for ear pain and nosebleeds. Negative for congestion, ear discharge, facial swelling, hearing loss, sinus pressure, sinus pain, sore throat and trouble swallowing.    Eyes:  Negative for visual disturbance.   Respiratory:  Negative for cough, shortness of breath and wheezing.    Cardiovascular:  Negative for chest pain, palpitations and leg swelling.   Gastrointestinal:  Positive for abdominal pain.   Genitourinary:  Positive for vaginal discharge. Negative for dysuria and vaginal bleeding.   Musculoskeletal:  Negative for gait problem.   Neurological:  Positive for dizziness.       Physical Exam     Initial Vitals [07/11/24 1223]   BP Pulse Resp Temp SpO2   111/65 95 20 97.8 °F (36.6 °C) 100 %      MAP       --         Physical Exam    Vitals reviewed.  Constitutional: She appears well-developed and well-nourished. She is not diaphoretic. No distress.   HENT:   Head: Normocephalic and atraumatic.   Right Ear: External ear normal.   Left Ear: External ear normal.   Nose: Nose normal.   Mouth/Throat: Oropharynx is clear and moist.   Eyes: EOM are normal.   Neck:   Normal range of motion.  Cardiovascular:  Normal rate, regular rhythm and normal heart sounds.           Pulmonary/Chest: Breath sounds normal. No respiratory distress.   Abdominal: Bowel sounds are normal. She exhibits no distension. There is no abdominal tenderness. There is no rebound and no guarding.   Genitourinary:    Vaginal discharge present.      Genitourinary Comments: White, thick, heterogenous discharge in vagina, cervix nonfriable, negative pooling, negative valsalva, negative ferning, negative nitrazine     Musculoskeletal:      Cervical back: Normal range of motion.     Neurological: She is alert and oriented to person, place, and time.   Skin: Skin is warm and dry.   Psychiatric: She has a normal mood and affect. Her behavior is normal.         ED Course   Obtain Fetal nonstress test  (NST)    Date/Time: 7/11/2024 1:55 AM    Performed by: Karen Stephenson MD  Authorized by: Karen Stephenson MD    Nonstress Test:     Variability:  6-25 BPM    Decelerations:  None    Accelerations:  15 bpm    Baseline:  150    Contractions:  Not present  Biophysical Profile:     Nonstress Test Interpretation: reactive      Overall Impression:  Reassuring  Post-procedure:     Patient tolerance:  Patient tolerated the procedure well with no immediate complications    Labs Reviewed   CBC W/ AUTO DIFFERENTIAL - Abnormal       Result Value    WBC 10.54      RBC 3.87 (*)     Hemoglobin 10.2 (*)     Hematocrit 31.3 (*)     MCV 81 (*)     MCH 26.4 (*)     MCHC 32.6      RDW 14.7 (*)     Platelets 380      MPV 10.1      Immature Granulocytes 0.6 (*)     Gran # (ANC) 7.2      Immature Grans (Abs) 0.06 (*)     Lymph # 2.4      Mono # 0.6      Eos # 0.2      Baso # 0.03      nRBC 0      Gran % 68.2      Lymph % 23.1      Mono % 5.8      Eosinophil % 2.0      Basophil % 0.3      Differential Method Automated     COMPREHENSIVE METABOLIC PANEL - Abnormal    Sodium 136      Potassium 3.6      Chloride 109      CO2 21 (*)     Glucose 108      BUN 9      Creatinine 0.6      Calcium 8.5 (*)     Total Protein 6.7      Albumin 2.8 (*)     Total Bilirubin 0.2      Alkaline Phosphatase 100      AST 10      ALT 11      eGFR >60      Anion Gap 6 (*)    POCT KOH - Abnormal    Yeast, UA Positive (*)     Bacteria Hyphae, POC Negative     POCT WET PREP - Abnormal    Yeast Wet Prep Positive (*)     Gardnerella vaginalis Negative      Trichomonas, UA Negative      Other Microsc. Observations        Clue Cells, POC Negative      POC Bacterial Vaginosis Negative     MAGNESIUM    Magnesium 1.8     PHOSPHORUS    Phosphorus 4.1     POCT URINALYSIS, DIPSTICK OR TABLET REAGENT, AUTOMATED, WITH MICROSCOP    Color, UA        Spec Grav UA        pH, UA        WBC, UA ++      Nitrite, UA        Protein, POC trace      Glucose, UA        Ketones, UA  +small      Urobilinogen, UA        Bilirubin, POC        Blood, UA       POCT PH OF BODY FLUIDS    pH, Body Fluid Negative     POCT FERN TEST, AMNIO    Fern Test Negative     POCT GLUCOSE    POCT Glucose 110          ECG Results              EKG 12-lead (In process)        Collection Time Result Time QRS Duration OHS QTC Calculation    24 14:48:51 24 11:37:37 74 437                     In process by Interface, Lab In [facility] (24 11:37:47)                   Narrative:    Test Reason : R42,R55,    Vent. Rate : 091 BPM     Atrial Rate : 091 BPM     P-R Int : 156 ms          QRS Dur : 074 ms      QT Int : 356 ms       P-R-T Axes : 038 012 034 degrees     QTc Int : 437 ms    Program found technically poor ECG  Normal sinus rhythm  Normal ECG  When compared with ECG of 13-MAY-2024 12:51,  No significant change was found    Referred By: [email or web address]   SELF           Confirmed By:                                   Imaging Results    None          Medications   acetaminophen tablet 1,000 mg (1,000 mg Oral Given 24 1449)   simethicone chewable tablet 80 mg (80 mg Oral Given 24 144)     Medical Decision Making  Sri Gomez is a 34 y.o. F at 25w2d presents complaining of headache, dizziness, thin yellow discharge, and abdominal pain.    Temp:  [97.8 °F (36.6 °C)]   Pulse:  [82-95]   Resp:  [20]   BP: (110-111)/(63-69)   SpO2:  [98 %-100 %]     [place] quiet  NST: 150 bpm baseline, moderate variability, +accels, no decels    1. Abdominal Pain  - Labs wnl as above, upper abdominal pain relieved with Tylenol and simethicone for gas  - Lower abdominal pain consistent with round ligament pain, reassurance provided, recommended belly band and warm bath/compresses  - [place] without ctx    2. Vaginal Yeast Infection  - ROM exam negative pooling, valsalva, ferning, nitrazine. No concern for ROM  - Spec exam as above, POCT wet prep and KOH confirmed presence of yeast  - Rx for terconazole  0.8% x3d  - POC glucose wnl, patient counseled on importance of glucose control in setting of both pregnancy and yeast infection    3. Dizziness/Presyncope  - VSS, NAD  - EKG normal sinus rhythm  - Electrolytes wnl  - Urine dip with small ketones  - Recommended increased PO hydration    4. Nosebleeds  - No active or dried blood from nares on exam  - Patient reports no history of nosebleeds or bleeding/clotting disorders  - Nosebleeds spontaneously stopped within minutes per patient  - Counseled on nosebleeds in pregnancy related to hormonal changes  - Recommended conservative management including humidifier and not sleeping under blowing fan    5. Ear Pain  - Ears without discharge or obvious abnormalities to external ear but OB ED is unable to fully evaluate and treat ear pain  - Recommended patient follow up with PCP to work up ear pain    - Patient stable for discharge at this time  - ED return precautions given  - She voiced understanding and is in agreement with the plan    Karen Stephenson MD  OB/GYN PGY-1    Amount and/or Complexity of Data Reviewed  Labs: ordered. Decision-making details documented in ED Course.    Risk  OTC drugs.  Prescription drug management.              Attending Attestation:   Physician Attestation Statement for Resident:  As the supervising MD   Physician Attestation Statement: I have personally seen and examined this patient.   I agree with the above history.  -:   As the supervising MD I agree with the above PE.     As the supervising MD I agree with the above treatment, course, plan, and disposition.   -: And T reactive and appropriate for gestational age, tocometer with no contractions.    Wet prep consistent with yeast, negative fern, negative nitrazine.    Prescription sent to pharmacy for terconazole to treat the vaginal yeast infection.  EKG normal.    Labs normal.    No active bleeding from the nares on exam in the OB ED.  Agree with discharge to home.    Encouraged patient  to drink adequate amounts of water given her hot working environment.    We will follow up with Ob in 1 week.  I was personally present during the critical portions of the procedure(s) performed by the resident and was immediately available in the ED to provide services and assistance as needed during the entire procedure.   I have reviewed the following: old records at this facility.                ED Course as of 07/20/24 1947   Sat Jul 20, 2024 1943 Yeast, Wet Prep(!): Positive [CD]   1943 Fern Test: Negative [CD]   1943 Ph, Body Fluid: Negative [CD]      ED Course User Index  [CD] Magaly Odonnell MD                           Clinical Impression:  Final diagnoses:  [B37.31] Vaginal yeast infection  [Z3A.25] 25 weeks gestation of pregnancy  [R42] Dizziness (Primary)  [R04.0] Nosebleed          ED Disposition Condition    Discharge Stable          ED Prescriptions       Medication Sig Dispense Start Date End Date Auth. Provider    terconazole (TERAZOL 3) 0.8 % vaginal cream Place 1 applicator vaginally every evening. 20 g 7/11/2024 -- Magaly Odonnell MD          Follow-up Information    None          Karen Stephenson MD  Resident  07/13/24 0158       Magaly Odonnell MD  07/20/24 1947       Magaly Odonnell MD  07/24/24 2051

## 2024-07-11 NOTE — PROGRESS NOTES
Pt admitted to OB ED c/o dizziness, abdominal pain, right ear pain, nose bleed.  Pt reports +FM.   used via [facility].  Dr. Stephenson notified

## 2024-07-15 ENCOUNTER — ROUTINE PRENATAL (OUTPATIENT)
Dept: OBSTETRICS AND GYNECOLOGY | Facility: CLINIC | Age: 34
End: 2024-07-15
Payer: MEDICAID

## 2024-07-15 ENCOUNTER — TELEPHONE (OUTPATIENT)
Dept: OBSTETRICS AND GYNECOLOGY | Facility: CLINIC | Age: 34
End: 2024-07-15

## 2024-07-15 VITALS — DIASTOLIC BLOOD PRESSURE: 64 MMHG | SYSTOLIC BLOOD PRESSURE: 98 MMHG | WEIGHT: 163.38 LBS | BODY MASS INDEX: 28.95 KG/M2

## 2024-07-15 DIAGNOSIS — O24.311 PRE-EXISTING DIABETES MELLITUS DURING PREGNANCY IN FIRST TRIMESTER: ICD-10-CM

## 2024-07-15 DIAGNOSIS — O24.312 PRE-EXISTING DIABETES MELLITUS DURING PREGNANCY IN SECOND TRIMESTER: ICD-10-CM

## 2024-07-15 DIAGNOSIS — Z34.92 PRENATAL CARE, SECOND TRIMESTER: Primary | ICD-10-CM

## 2024-07-15 DIAGNOSIS — H92.01 EARACHE ON RIGHT: ICD-10-CM

## 2024-07-15 PROCEDURE — 99213 OFFICE O/P EST LOW 20 MIN: CPT | Mod: TH,S$PBB,, | Performed by: OBSTETRICS & GYNECOLOGY

## 2024-07-15 PROCEDURE — 99999 PR PBB SHADOW E&M-EST. PATIENT-LVL III: CPT | Mod: PBBFAC,,, | Performed by: OBSTETRICS & GYNECOLOGY

## 2024-07-15 PROCEDURE — 99213 OFFICE O/P EST LOW 20 MIN: CPT | Mod: PBBFAC,TH,PO | Performed by: OBSTETRICS & GYNECOLOGY

## 2024-07-15 NOTE — PROGRESS NOTES
I hereby acknowledge that I am relying upon documentation authored by a medical student working under my supervision and further I hereby attest that I have verified the student documentation or findings by personally performing the physical exam and medical decision making activities of the Evaluation and Management service to be billed.  Gisella Andrews    Changes to note; FM appt made for earache only at this time and to at least establish with FM so when she is no longer pregnant they can manage Diabetes after pregnancy.    Patient did not bring log today

## 2024-07-15 NOTE — PROGRESS NOTES
#[id number] (Srinivasan)    HPI:   34 y.o.        Patient discussed recent visit to emergency room regarding ear pain, nose bleeds, and abdominal pain. Denies contractions. Denies vaginal bleeding or cramping.  Good FM. Ultrasound reviewed and consents signed. Discussed with patient having glucose testing for gestational diabetes preformed between 24-28 weeks.        ROS:  GENERAL: Denies weight gain or weight loss. Feeling well overall. +FM.   HEAD: No headache.   ABDOMEN: No abdominal pain, constipation, diarrhea, nausea, vomiting.   URINARY: No frequency, dysuria, hematuria, or burning on urination.  EXTREMITIES: No swelling     Vitals signs, FHTs (150s bpm), urine dip, and PE findings documented, reviewed and available in OB flow chart. Fundal height 24cm. Discussed Afrin nasal spray and advised patient to see Family Medicine for follow up for ear and diabetes, appointment made. Signed off for blood pressure cuff. Discussed patient will need prenatal testing.      I spent a total of 15 minutes on the day of the visit.This includes face to face time and non-face to face time preparing to see the patient (eg, review of tests), Obtaining and/or reviewing separately obtained history, Documenting clinical information in the electronic or other health record, Independently interpreting resultsand communicating results to the patient/family/caregiver, or Care coordination.       ASSESSMENT:  25w6d    PLAN:   RTO 2-3 weeks for bloodwork and visit

## 2024-07-15 NOTE — TELEPHONE ENCOUNTER
Please schedule BPP weekly starting at 32 weeks  Please schedule NST weekly at 32 weeks (she is now 26 weeks) best to schedule on Mondays as she sees MFM  Have her see me at 30, 32 34, 36 37,38, 39

## 2024-07-16 ENCOUNTER — PATIENT MESSAGE (OUTPATIENT)
Dept: OTHER | Facility: OTHER | Age: 34
End: 2024-07-16
Payer: MEDICAID

## 2024-07-18 ENCOUNTER — OFFICE VISIT (OUTPATIENT)
Dept: MATERNAL FETAL MEDICINE | Facility: CLINIC | Age: 34
End: 2024-07-18
Payer: MEDICAID

## 2024-07-18 ENCOUNTER — PATIENT MESSAGE (OUTPATIENT)
Dept: MATERNAL FETAL MEDICINE | Facility: CLINIC | Age: 34
End: 2024-07-18

## 2024-07-18 ENCOUNTER — PROCEDURE VISIT (OUTPATIENT)
Dept: MATERNAL FETAL MEDICINE | Facility: CLINIC | Age: 34
End: 2024-07-18
Payer: MEDICAID

## 2024-07-18 ENCOUNTER — HOSPITAL ENCOUNTER (EMERGENCY)
Facility: OTHER | Age: 34
Discharge: HOME | End: 2024-07-18
Attending: OBSTETRICS & GYNECOLOGY
Payer: MEDICAID

## 2024-07-18 VITALS
HEIGHT: 63 IN | WEIGHT: 161.69 LBS | BODY MASS INDEX: 28.65 KG/M2 | DIASTOLIC BLOOD PRESSURE: 74 MMHG | SYSTOLIC BLOOD PRESSURE: 120 MMHG

## 2024-07-18 VITALS
DIASTOLIC BLOOD PRESSURE: 65 MMHG | RESPIRATION RATE: 17 BRPM | OXYGEN SATURATION: 100 % | TEMPERATURE: 98 F | SYSTOLIC BLOOD PRESSURE: 109 MMHG | HEART RATE: 81 BPM

## 2024-07-18 DIAGNOSIS — O24.111 TYPE 2 DIABETES MELLITUS AFFECTING PREGNANCY IN FIRST TRIMESTER, ANTEPARTUM: ICD-10-CM

## 2024-07-18 DIAGNOSIS — N93.9 VAGINAL BLEEDING: ICD-10-CM

## 2024-07-18 DIAGNOSIS — O23.592: Primary | ICD-10-CM

## 2024-07-18 DIAGNOSIS — Z36.89 ENCOUNTER FOR ULTRASOUND TO ASSESS FETAL GROWTH: Primary | ICD-10-CM

## 2024-07-18 DIAGNOSIS — O46.90 VAGINAL BLEEDING DURING PREGNANCY: ICD-10-CM

## 2024-07-18 DIAGNOSIS — Z36.89 ENCOUNTER FOR ULTRASOUND TO ASSESS FETAL GROWTH: ICD-10-CM

## 2024-07-18 DIAGNOSIS — Z3A.26 26 WEEKS GESTATION OF PREGNANCY: ICD-10-CM

## 2024-07-18 PROCEDURE — 1159F MED LIST DOCD IN RCRD: CPT | Mod: CPTII,,, | Performed by: OBSTETRICS & GYNECOLOGY

## 2024-07-18 PROCEDURE — 76816 OB US FOLLOW-UP PER FETUS: CPT | Mod: 26,S$PBB,, | Performed by: OBSTETRICS & GYNECOLOGY

## 2024-07-18 PROCEDURE — 59025 FETAL NON-STRESS TEST: CPT

## 2024-07-18 PROCEDURE — 59025 FETAL NON-STRESS TEST: CPT | Mod: 26,,, | Performed by: OBSTETRICS & GYNECOLOGY

## 2024-07-18 PROCEDURE — 76816 OB US FOLLOW-UP PER FETUS: CPT | Mod: PBBFAC | Performed by: OBSTETRICS & GYNECOLOGY

## 2024-07-18 PROCEDURE — 99213 OFFICE O/P EST LOW 20 MIN: CPT | Mod: S$PBB,TH,, | Performed by: OBSTETRICS & GYNECOLOGY

## 2024-07-18 PROCEDURE — 99213 OFFICE O/P EST LOW 20 MIN: CPT | Mod: PBBFAC,TH,25 | Performed by: OBSTETRICS & GYNECOLOGY

## 2024-07-18 PROCEDURE — 3008F BODY MASS INDEX DOCD: CPT | Mod: CPTII,,, | Performed by: OBSTETRICS & GYNECOLOGY

## 2024-07-18 PROCEDURE — 3074F SYST BP LT 130 MM HG: CPT | Mod: CPTII,,, | Performed by: OBSTETRICS & GYNECOLOGY

## 2024-07-18 PROCEDURE — 99283 EMERGENCY DEPT VISIT LOW MDM: CPT | Mod: 25,,, | Performed by: OBSTETRICS & GYNECOLOGY

## 2024-07-18 PROCEDURE — 99999 PR PBB SHADOW E&M-EST. PATIENT-LVL III: CPT | Mod: PBBFAC,,, | Performed by: OBSTETRICS & GYNECOLOGY

## 2024-07-18 PROCEDURE — 3078F DIAST BP <80 MM HG: CPT | Mod: CPTII,,, | Performed by: OBSTETRICS & GYNECOLOGY

## 2024-07-18 PROCEDURE — 81514 NFCT DS BV&VAGINITIS DNA ALG: CPT | Performed by: OBSTETRICS & GYNECOLOGY

## 2024-07-18 PROCEDURE — 99284 EMERGENCY DEPT VISIT MOD MDM: CPT | Mod: 25,27

## 2024-07-18 PROCEDURE — 3044F HG A1C LEVEL LT 7.0%: CPT | Mod: CPTII,,, | Performed by: OBSTETRICS & GYNECOLOGY

## 2024-07-18 RX ORDER — TERCONAZOLE 8 MG/G
1 CREAM VAGINAL NIGHTLY
Qty: 20 G | Refills: 0 | Status: SHIPPED | OUTPATIENT
Start: 2024-07-18 | End: 2024-07-22

## 2024-07-18 NOTE — ED PROVIDER NOTES
Encounter Date: 2024       History   No chief complaint on file.    Sri Gomez is a 34 y.o. F at 26w2d who presents complaining of vaginal bleeding. 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 Patient denies any recent trauma to the area. She presents a photo of the bleeding which shows bright red blood on toilet paper approximately 15 cc worth. Patient reports she went to the bathroom again around 9 am and had more bleeding than in the initial picture. Denies any contractions or abdominal pain aside from her known round ligament pain. She denies cramping. Endorses pelvic pressure. Patient has no further complaints at this time.    This IUP is complicated by GDM on insulin.  Patient denies contractions, reports vaginal bleeding, denies LOF.   Fetal Movement: normal      Review of patient's allergies indicates:  No Known Allergies  Past Medical History:   Diagnosis Date    Diabetes mellitus      No past surgical history on file.  Family History   Problem Relation Name Age of Onset    Diabetes Paternal Grandmother      Hypertension Paternal Grandmother      Hypertension Father      Diabetes Father      Hypertension Mother      Diabetes Mother       Social History     Tobacco Use    Smoking status: Never    Smokeless tobacco: Never   Substance Use Topics    Alcohol use: Not Currently    Drug use: Never     Review of Systems   Constitutional:  Negative for chills and fever.   HENT:  Negative for congestion and sore throat.    Eyes:  Negative for visual disturbance.   Respiratory:  Negative for shortness of breath.    Cardiovascular:  Negative for chest pain.   Gastrointestinal:  Negative for nausea.   Genitourinary:  Positive for vaginal bleeding. Negative for dysuria.   Musculoskeletal:  Negative for back pain.   Skin:  Negative for rash.   Neurological:  Negative for weakness and headaches.   Hematological:  Does not  bruise/bleed easily.       Physical Exam     Initial Vitals   BP Pulse Resp Temp SpO2   07/18/24 1053 07/18/24 1050 07/18/24 1053 07/18/24 1053 07/18/24 1050   109/65 85 17 97.7 °F (36.5 °C) 100 %      MAP       --                Physical Exam    Nursing note and vitals reviewed.  Constitutional: She appears well-developed and well-nourished. She is not diaphoretic. No distress.   HENT:   Head: Normocephalic and atraumatic.   Eyes: EOM are normal.   Neck:   Normal range of motion.  Cardiovascular:  Normal rate.           Pulmonary/Chest: No respiratory distress.   Abdominal: Abdomen is soft. She exhibits no distension. There is no abdominal tenderness. There is no rebound and no guarding.   Genitourinary:    Genitourinary Comments: Copious thick white homogenous discharge with small old blood mixed, approx. 2 cc old blood. No bright red bleeding per cervix, no blood clots. Very tender to speculum exam. Cervix cl/th/hi     Musculoskeletal:         General: No tenderness or edema. Normal range of motion.      Cervical back: Normal range of motion.     Neurological: She is alert and oriented to person, place, and time.   Skin: Skin is warm and dry.   Psychiatric: She has a normal mood and affect. Her behavior is normal.         ED Course   Obtain Fetal nonstress test (NST)    Date/Time: 7/18/2024 11:44 AM    Performed by: Karen Stephenson MD  Authorized by: Karen Stephenson MD    Nonstress Test:     Variability:  6-25 BPM    Decelerations:  None    Accelerations:  10 bpm    Baseline:  140    Uterine Irritability: No      Contractions:  Not present  Biophysical Profile:     Nonstress Test Interpretation: reactive      Overall Impression:  Reassuring  Post-procedure:     Patient tolerance:  Patient tolerated the procedure well with no immediate complications    Labs Reviewed   VAGINOSIS SCREEN BY DNA PROBE          Imaging Results    None          Media Information    Placenta does not appear to be overlying  cervix         Medications - No data to display  Medical Decision Making  Sri Gomez is a 34 y.o. F at 26w2d who presents complaining of vaginal bleeding.    Temp:  [97.7 °F (36.5 °C)] 97.7 °F (36.5 °C)  Pulse:  [81-85] 81  Resp:  [17] 17  SpO2:  [100 %] 100 %  BP: (109)/(65) 109/65    NST: 140 baseline, moderate variability, +accels, no decels  [place]: quiet/no ctx    Vaginal Bleeding in Second Trimester  - VSS, NAD  - Denies abdominal pains/intense cramping  - Posterior placenta; BSUS without evidence of active placental bleeding or placenta overlying cervix  - SSE: copious homogenous white discharge w/o active bleeding c/w previously diagnosed yeast infection  - SVE: cervix cl/th/hi per Dr. Capellan  - Patient reports compliance with tx, was sent terconazole 0.8% for 3 days, pt has one day remaining  - Sent prescription for additional 4 days of terconazole 0.8%  - Counseled patient on untreated infections irritating vagina and cervix and causing vaginal bleeding  - Return precautions given    - Patient stable for discharge at this time  - ED return precautions given  - She voiced understanding and is in agreement with the plan    Karen Stephenson MD  OB/GYN PGY-1                                      Clinical Impression:  Final diagnoses:  [O23.592] Vaginitis affecting pregnancy, current, antepartum, second trimester (Primary)  [Z3A.26] 26 weeks gestation of pregnancy  [N93.9] Vaginal bleeding          ED Disposition Condition    Discharge Stable          ED Prescriptions       Medication Sig Dispense Start Date End Date Auth. Provider    terconazole (TERAZOL 3) 0.8 % vaginal cream Place 1 applicator vaginally nightly. for 4 days 20 g 2024 Karen Stephenson MD          Follow-up Information    None          Karen Stephenson MD  Resident  24 [id number]

## 2024-07-18 NOTE — DISCHARGE INSTRUCTIONS
Comuníquese con fontenot obstetra primario o fuera del horario de atención al [phone] si experimenta alguno de los siguientes síntomas:    Contracciones cada 7 a 10 minutos hanna 1 hora o más.   Un chorro repentino o edgar fuga ric de líquido.  Sangrado vaginal abundante.   Si experimenta dolor de usha ric, visión borrosa, dolor debajo de la kota derecha o hinchazón repentina de las jose, los pies y la anusha.     Recuerda mantenerte hidratado; Abi de 8 a 10 botellas de agua al día.     Mantener todas las citas de seguimiento.

## 2024-07-19 PROBLEM — O46.90 VAGINAL BLEEDING DURING PREGNANCY: Status: ACTIVE | Noted: 2024-07-19

## 2024-07-19 NOTE — ASSESSMENT & PLAN NOTE
Please see [facility] for full H & P from today.  Ultrasound does demonstrate a small KARAN, difficult to determine if this is the etiology for her bleeding.   Given her improved symptoms, ok to continue outpatient monitoring.   Strict precautions were reviewed. Patient to monitor closely and consider [facility] visit if symptoms recur.

## 2024-07-19 NOTE — TELEPHONE ENCOUNTER
BPP and NST appts made. You will be out the end of pts pregnancy. No appts scheduled for 38 & 39 week PEYMAN visit.

## 2024-07-19 NOTE — ASSESSMENT & PLAN NOTE
Age of Dx: 2015 (in [place] 9 years prior)  Comorbidities: None  Prepregnancy regimen:           Lantus 44u daily  Baseline HgA1c: 12.2% (2/20/24)  Last A1c:   Lab Results   Component Value Date    HGBA1C 6.2 (H) 06/17/2024     Blood sugar review was unable to be performed as patient did not bring her BS log today (due to heading to [facility] for bleeding)    Current regimen (as of 07/19/2024):   NPH 22/26  Aspart 5/X/6 WM  FETAL ECHO - completed today and wnl  MATERNAL ECHO - pending  Dietary counseling - done    Recommendations:  No changes to insulin regimen  NPH 22/26  Aspart 5/x/6 WM   Will await patient to send BS log through portal to review and determine if further adjustment will be needed.  Continue to check BS 4x daily  Hypoglycemia precautions were once again reviewed.  F/U check in 2 weeks virtually - scheduled  Twice weekly BPP/ NST + AFV starting at 32 weeks  Needs enrollment in connected mom or home BP cuff, if not done    Delivery timing:  Per prior recs

## 2024-07-19 NOTE — PROGRESS NOTES
"Maternal Fetal Medicine follow up consult    SUBJECTIVE:     Sri Gomez is a 34 y.o.  female with IUP at 26w2d who is seen in follow up consultation by MFM.  Pregnancy complications include:   Problem   Vaginal Bleeding During Pregnancy   Type 2 Diabetes Mellitus Affecting Pregnancy in First Trimester, Antepartum     Previous notes reviewed.   No changes to medical, surgical, family, social, or obstetric history.    Interval history since last M visit:   Patient reports moderate bleeding this morning. 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 She reports two episodes with cramping. She went to [facility] (please see note) and was cleared with plan to continue her antifugal medication.   Currently overall feeling well with rare lower abdominal pain.  Patient denies any leakage of fluid.  She reports good fetal movement.    Medications:  Current Outpatient Medications   Medication Instructions    aspirin (ECOTRIN) 81 mg, Oral, Daily    blood-glucose sensor (DEXCOM G7 SENSOR) Rena 1 Device, Misc.(Non-Drug; Combo Route), Continuous    famotidine (PEPCID) 40 mg, Oral, Nightly    folic acid (FOLVITE) 400 mcg, Oral, Daily    insulin aspart U-100 (NOVOLOG) 100 unit/mL (3 mL) InPn pen Inject 5 Units into the skin before breakfast AND 6 Units before dinner.    insulin NPH isoph U-100 human (HUMULIN N NPH INSULIN KWIKPEN) 100 unit/mL (3 mL) InPn Inject 22 Units into the skin before breakfast AND 26 Units every evening.    pen needle, diabetic 32 gauge x 5/32" Ndle 1 Syringe, Misc.(Non-Drug; Combo Route), 4 times daily    polyethylene glycol (GLYCOLAX) 17 g, Oral, Daily    senna (SENOKOT) 8.6 mg tablet 1 tablet, Oral, Daily    terconazole (TERAZOL 3) 0.8 % vaginal cream 1 applicator, Vaginal, Nightly    terconazole (TERAZOL 3) 0.8 % vaginal cream 1 applicator, Vaginal, Nightly    TRUE METRIX GLUCOSE METER Misc use TO test blood glucose FOUR TIMES DAILY    TRUE METRIX GLUCOSE TEST " "STRIP Strp 1 strip, Subcutaneous, 4 times daily    TRUEPLUS LANCETS 33 gauge Misc 1 lancet , Subcutaneous, 4 times daily     Care team members:  Darryl - Primary OB     OBJECTIVE:   /74 (BP Location: Left arm, Patient Position: Sitting, BP Method: Large (Automatic))   Ht 5' 2.99" (1.6 m)   Wt 73.4 kg (161 lb 11.3 oz)   LMP 01/10/2024 (Approximate)   BMI 28.65 kg/m²     Physical Exam:  Deferred    Ultrasound performed. See viewpoint for full ultrasound report.  Severino live IUP  Fetal size is appropriate for gestational age, with the EFW (1024 g) plotting at the 59% and the AC plotting at the 85%.   A limited repeat fetal anatomic survey appears normal.   The MVP is normal.   Possible small subchorionic hemorrhage is seen. No evidence of active bleeding on ultrasound.    Significant labs/imaging:  None new.   Patient did not bring her BS log    ASSESSMENT/PLAN:     34 y.o.  female with IUP at 26w3d    Type 2 diabetes mellitus affecting pregnancy in first trimester, antepartum  Age of Dx:  (in [place] 9 years prior)  Comorbidities: None  Prepregnancy regimen:           Lantus 44u daily  Baseline HgA1c: 12.2% (24)  Last A1c:   Lab Results   Component Value Date    HGBA1C 6.2 (H) 2024     Blood sugar review was unable to be performed as patient did not bring her BS log today (due to heading to [facility] for bleeding)    Current regimen (as of 2024):   NPH 22/26  Aspart 5/X/6 WM  FETAL ECHO - completed today and wnl  MATERNAL ECHO - pending  Dietary counseling - done    Recommendations:  No changes to insulin regimen  NPH 22/26  Aspart 5/x/6 WM   Will await patient to send BS log through portal to review and determine if further adjustment will be needed.  Continue to check BS 4x daily  Hypoglycemia precautions were once again reviewed.  F/U check in 2 weeks virtually - scheduled  Twice weekly BPP/ NST + AFV starting at 32 weeks  Needs enrollment in connected mom or home BP cuff, if not " done    Delivery timing:  Per prior recs      Vaginal bleeding during pregnancy  Please see [facility] for full H & P from today.  Ultrasound does demonstrate a small KAARN, difficult to determine if this is the etiology for her bleeding.   Given her improved symptoms, ok to continue outpatient monitoring.   Strict precautions were reviewed. Patient to monitor closely and consider [facility] visit if symptoms recur.      Patient was counseled that prenatal ultrasound studies have limitations. They do not detect all fetal, genetic, placental, and maternal abnormalities.     FOLLOW UP:   A follow up ultrasound will be made for 4 weeks from today with a follow up MFM MD visit      The patient was given an opportunity to ask questions about the management of her high risk pregnancy problems. She expressed an understanding of and agreement to the above impression and plan. All questions were answered to her satisfaction.    25 minutes of total time spent on the encounter, which includes face to face time and non-face to face time preparing to see the patient (eg, review of tests), obtaining and/or reviewing separately obtained history, documenting clinical information in the electronic or other health record, independently interpreting results (not separately reported) and communicating results to the patient/family/caregiver, or care coordination (not separately reported).        Robinson Joiner MD   Maternal-Fetal Medicine      Electronically Signed by Robinson Joiner July 19, 2024

## 2024-07-21 LAB
OHS QRS DURATION: 74 MS
OHS QTC CALCULATION: 437 MS

## 2024-07-22 ENCOUNTER — CLINICAL SUPPORT (OUTPATIENT)
Dept: DIABETES | Facility: CLINIC | Age: 34
End: 2024-07-22
Payer: MEDICAID

## 2024-07-22 ENCOUNTER — PATIENT MESSAGE (OUTPATIENT)
Dept: MATERNAL FETAL MEDICINE | Facility: CLINIC | Age: 34
End: 2024-07-22
Payer: MEDICAID

## 2024-07-22 DIAGNOSIS — O24.111 TYPE 2 DIABETES MELLITUS AFFECTING PREGNANCY IN FIRST TRIMESTER, ANTEPARTUM: Primary | ICD-10-CM

## 2024-07-22 PROCEDURE — 99999PBSHW PR PBB SHADOW TECHNICAL ONLY FILED TO HB: Mod: PBBFAC,,,

## 2024-07-22 PROCEDURE — G0108 DIAB MANAGE TRN  PER INDIV: HCPCS | Mod: PBBFAC,PO | Performed by: DIETITIAN, REGISTERED

## 2024-07-22 NOTE — PROGRESS NOTES
Diabetes Care Specialist Progress Note  Author: Yodit Corbin RD  Date: 7/22/2024    Intake    Program Intake  Reason for Diabetes Program Visit:: Intervention  Type of Intervention:: Individual  Individual: Education  Education: Self-Management Skill Review  Current diabetes risk level:: low  In the last 12 months, have you:: used emergency room services  Was the ER or hospital admission related to diabetes?: No  Permission to speak with others about care:: no    Current Diabetes Treatment: Insulin  Method of insulin delivery?: Injections  Injection Type: Pens  Pen Type/Dose: NPH 22u with breakfast and 26u QHS; Novolog 5u with breakfast and 6u with dinner    Continuous Glucose Monitoring  Patient has CGM: No    Lab Results   Component Value Date    HGBA1C 6.2 (H) 06/17/2024               There is no height or weight on file to calculate BMI.    Lifestyle Coping Support & Clinical    Lifestyle/Coping/Support  Does anyone in your family have diabetes or does anyone in your family support you in your diabetes care?:  supports  List anything about Diabetes that causes you stress?: trying to find foods I want to eat during pregnancy (has aversion to a lot of protein foods)  How do you deal with stress/distress?: seek answers  Learning Barriers:: None  Culture or Taoism beliefs that may impact ability to access healthcare: No  Psychosocial/Coping Skills Assessment Completed: : Yes  Assessment indicates:: Adequate understanding  Area of need?: No         Diabetes Self-Management Skills Assessment    Medication Skills Assessment  Patient is able to identify current diabetes medications, dosages, and appropriate timing of medications.: yes  Patient reports problems or concerns with current medication regimen.: no  Patient is  aware that some diabetes medications can cause low blood sugar?: Yes  Medication Skills Assessment Completed:: Yes  Assessment indicates:: Adequate understanding  Area of need?:  No    Diabetes Disease Process/Treatment Options  Diabetes Type?: Type II  When were you diagnosed?: 2075  If previous diabetes education, when/where:: [place]  What are your goals for this education session?: to learn how to lower/manage BG numbers  Is patient aware of what causes diabetes?: Yes  Does patient understand the pathophysiology of diabetes?: Yes  Diabetes Disease Process/Treatment Options: Skills Assessment Completed: Yes  Assessment indicates:: Adequate understanding  Area of need?: No    Nutrition/Healthy Eating  Meal Plan 24 Hour Recall - Breakfast: milk, papaya, tortilla with cheese  Meal Plan 24 Hour Recall - Lunch: checken stew, lettuce, 1/2 potato  Meal Plan 24 Hour Recall - Dinner: same as lunch or dinner  Meal Plan 24 Hour Recall - Snack: fruit, drinks water or water with pineapple juice  Patient can identify foods that impact blood sugar.: yes  Challenges to healthy eating:: portion control  Assessment indicates:: Instruction Needed  Area of need?: Yes    Physical Activity/Exercise  Patient's daily activity level:: lightly active  Patient formally exercises outside of work.: yes  Frequency: four or more times a week  Patient can identify forms of physical activity.: yes  Assessment indicates:: Instruction Needed  Area of need?: Yes    Home Blood Glucose Monitoring  Patient states that blood sugar is checked at home daily.: yes  Monitoring Method:: home glucometer  Assessment indicates:: Instruction Needed  Area of need?: Yes    Acute Complications  Deffered due to:: Time    Chronic Complications  Deferred due to:: Time      Assessment Summary and Plan    Based on today's diabetes care assessment, the following areas of need were identified:          7/22/2024    12:01 AM   Areas of Need   Medications/Current Diabetes Treatment No   Lifestyle Coping Support No   Diabetes Disease Process/Treatment Options No   Nutrition/Healthy Eating Yes   Physical Activity/Exercise Yes   Home Blood Glucose  Monitoring Yes       Today's interventions were provided through individual discussion, instruction, and written materials were provided.      Patient verbalized understanding of instruction and written materials.  Pt was able to return back demonstration of instructions today. Patient understood key points, needs reinforcement and further instruction.     Diabetes Self-Management Care Plan:    Today's Diabetes Self-Management Care Plan was developed with Sri's input. Sri has agreed to work toward the following goal(s) to improve his/her overall diabetes control.            Follow Up Plan     Follow up in about 2 months (around 9/22/2024). Review A1c and BG numbers (especially fasting); assess for hypoglycemia; dietary adjustments if needed.    Today's care plan and follow up schedule was discussed with patient.  Sri verbalized understanding of the care plan, goals, and agrees to follow up plan.        The patient was encouraged to communicate with his/her health care provider/physician and care team regarding his/her condition(s) and treatment.  I provided the patient with my contact information today and encouraged to contact me via phone or [facility] Patient Portal as needed.     Length of Visit   Total Time: 60 Minutes

## 2024-07-30 ENCOUNTER — PATIENT MESSAGE (OUTPATIENT)
Dept: OTHER | Facility: OTHER | Age: 34
End: 2024-07-30
Payer: MEDICAID

## 2024-08-05 ENCOUNTER — ROUTINE PRENATAL (OUTPATIENT)
Dept: OBSTETRICS AND GYNECOLOGY | Facility: CLINIC | Age: 34
End: 2024-08-05
Payer: MEDICAID

## 2024-08-05 VITALS
SYSTOLIC BLOOD PRESSURE: 114 MMHG | BODY MASS INDEX: 28.99 KG/M2 | WEIGHT: 163.56 LBS | DIASTOLIC BLOOD PRESSURE: 75 MMHG

## 2024-08-05 DIAGNOSIS — O26.893 ABDOMINAL PAIN DURING PREGNANCY IN THIRD TRIMESTER: ICD-10-CM

## 2024-08-05 DIAGNOSIS — Z34.92 PRENATAL CARE, SECOND TRIMESTER: Primary | ICD-10-CM

## 2024-08-05 DIAGNOSIS — R10.9 ABDOMINAL PAIN DURING PREGNANCY IN THIRD TRIMESTER: ICD-10-CM

## 2024-08-05 LAB
BILIRUB SERPL-MCNC: NORMAL MG/DL
BLOOD URINE, POC: NORMAL
CLARITY, POC UA: CLEAR
COLOR, POC UA: YELLOW
FIBRONECTIN FETAL SPEC QL: NEGATIVE
GLUCOSE UR QL STRIP: NORMAL
KETONES UR QL STRIP: NORMAL
LEUKOCYTE ESTERASE URINE, POC: NORMAL
NITRITE, POC UA: NORMAL
PH, POC UA: 6.5
PROTEIN, POC: NORMAL
SPECIFIC GRAVITY, POC UA: 1.02
UROBILINOGEN, POC UA: 0.2

## 2024-08-05 PROCEDURE — 99999 PR PBB SHADOW E&M-EST. PATIENT-LVL III: CPT | Mod: PBBFAC,,, | Performed by: OBSTETRICS & GYNECOLOGY

## 2024-08-05 PROCEDURE — 99213 OFFICE O/P EST LOW 20 MIN: CPT | Mod: PBBFAC,TH,PO | Performed by: OBSTETRICS & GYNECOLOGY

## 2024-08-05 PROCEDURE — 99999PBSHW POCT URINE DIPSTICK WITHOUT MICROSCOPE: Mod: PBBFAC,,,

## 2024-08-05 PROCEDURE — 99214 OFFICE O/P EST MOD 30 MIN: CPT | Mod: TH,S$PBB,, | Performed by: OBSTETRICS & GYNECOLOGY

## 2024-08-05 PROCEDURE — 87086 URINE CULTURE/COLONY COUNT: CPT | Performed by: OBSTETRICS & GYNECOLOGY

## 2024-08-05 PROCEDURE — 81002 URINALYSIS NONAUTO W/O SCOPE: CPT | Mod: PBBFAC,PO | Performed by: OBSTETRICS & GYNECOLOGY

## 2024-08-07 ENCOUNTER — OFFICE VISIT (OUTPATIENT)
Dept: FAMILY MEDICINE | Facility: HOSPITAL | Age: 34
End: 2024-08-07
Payer: MEDICAID

## 2024-08-07 ENCOUNTER — HOSPITAL ENCOUNTER (OUTPATIENT)
Facility: HOSPITAL | Age: 34
Discharge: HOME | End: 2024-08-07
Attending: STUDENT IN AN ORGANIZED HEALTH CARE EDUCATION/TRAINING PROGRAM | Admitting: STUDENT IN AN ORGANIZED HEALTH CARE EDUCATION/TRAINING PROGRAM
Payer: MEDICAID

## 2024-08-07 ENCOUNTER — TELEPHONE (OUTPATIENT)
Dept: OBSTETRICS AND GYNECOLOGY | Facility: CLINIC | Age: 34
End: 2024-08-07
Payer: MEDICAID

## 2024-08-07 VITALS
BODY MASS INDEX: 29.06 KG/M2 | RESPIRATION RATE: 16 BRPM | OXYGEN SATURATION: 98 % | WEIGHT: 164 LBS | TEMPERATURE: 98 F | DIASTOLIC BLOOD PRESSURE: 70 MMHG | HEART RATE: 98 BPM | HEIGHT: 63 IN | SYSTOLIC BLOOD PRESSURE: 104 MMHG

## 2024-08-07 VITALS
OXYGEN SATURATION: 100 % | DIASTOLIC BLOOD PRESSURE: 72 MMHG | HEART RATE: 81 BPM | TEMPERATURE: 98 F | RESPIRATION RATE: 19 BRPM | SYSTOLIC BLOOD PRESSURE: 118 MMHG

## 2024-08-07 DIAGNOSIS — R10.9 ABDOMINAL PAIN DURING PREGNANCY, ANTEPARTUM: ICD-10-CM

## 2024-08-07 DIAGNOSIS — O24.111 TYPE 2 DIABETES MELLITUS AFFECTING PREGNANCY IN FIRST TRIMESTER, ANTEPARTUM: ICD-10-CM

## 2024-08-07 DIAGNOSIS — H92.01 EARACHE ON RIGHT: ICD-10-CM

## 2024-08-07 DIAGNOSIS — M65.351 TRIGGER LITTLE FINGER OF RIGHT HAND: ICD-10-CM

## 2024-08-07 DIAGNOSIS — Z76.89 ENCOUNTER TO ESTABLISH CARE: Primary | ICD-10-CM

## 2024-08-07 DIAGNOSIS — O26.899 ABDOMINAL PAIN DURING PREGNANCY, ANTEPARTUM: ICD-10-CM

## 2024-08-07 LAB
BACTERIA #/AREA URNS HPF: NORMAL /HPF
BACTERIA UR CULT: NORMAL
BACTERIA UR CULT: NORMAL
BILIRUB UR QL STRIP: NEGATIVE
CLARITY UR: CLEAR
COLOR UR: YELLOW
GLUCOSE UR QL STRIP: NEGATIVE
HGB UR QL STRIP: NEGATIVE
KETONES UR QL STRIP: ABNORMAL
LEUKOCYTE ESTERASE UR QL STRIP: ABNORMAL
MICROSCOPIC COMMENT: NORMAL
NITRITE UR QL STRIP: NEGATIVE
PH UR STRIP: 7 [PH] (ref 5–8)
PROT UR QL STRIP: NEGATIVE
RBC #/AREA URNS HPF: 0 /HPF (ref 0–4)
SP GR UR STRIP: 1.02 (ref 1–1.03)
SQUAMOUS #/AREA URNS HPF: 1 /HPF
URN SPEC COLLECT METH UR: ABNORMAL
UROBILINOGEN UR STRIP-ACNC: NEGATIVE EU/DL
WBC #/AREA URNS HPF: 2 /HPF (ref 0–5)

## 2024-08-07 PROCEDURE — 81000 URINALYSIS NONAUTO W/SCOPE: CPT | Performed by: OBSTETRICS & GYNECOLOGY

## 2024-08-07 PROCEDURE — 99211 OFF/OP EST MAY X REQ PHY/QHP: CPT | Mod: 25

## 2024-08-07 PROCEDURE — 25000003 PHARM REV CODE 250: Performed by: OBSTETRICS & GYNECOLOGY

## 2024-08-07 PROCEDURE — 59025 FETAL NON-STRESS TEST: CPT | Mod: 76

## 2024-08-07 PROCEDURE — 99215 OFFICE O/P EST HI 40 MIN: CPT | Mod: 25,27

## 2024-08-07 PROCEDURE — 63600175 PHARM REV CODE 636 W HCPCS: Performed by: OBSTETRICS & GYNECOLOGY

## 2024-08-07 RX ORDER — SODIUM CHLORIDE, SODIUM LACTATE, POTASSIUM CHLORIDE, CALCIUM CHLORIDE 600; 310; 30; 20 MG/100ML; MG/100ML; MG/100ML; MG/100ML
INJECTION, SOLUTION INTRAVENOUS ONCE
Status: COMPLETED | OUTPATIENT
Start: 2024-08-07 | End: 2024-08-07

## 2024-08-07 RX ORDER — ACETAMINOPHEN 500 MG
1000 TABLET ORAL ONCE
Status: COMPLETED | OUTPATIENT
Start: 2024-08-07 | End: 2024-08-07

## 2024-08-07 RX ADMIN — SODIUM CHLORIDE, POTASSIUM CHLORIDE, SODIUM LACTATE AND CALCIUM CHLORIDE: 600; 310; 30; 20 INJECTION, SOLUTION INTRAVENOUS at 04:08

## 2024-08-07 RX ADMIN — ACETAMINOPHEN 1000 MG: 500 TABLET ORAL at 04:08

## 2024-08-07 NOTE — NURSING
"Patient reports a decrease in abdominal pain after taking tylenol. No blood noted on chux pad when patient ambulated to the bathroom. Pt states, "it only hurts when I am up walking around". Urine sent to laboratory for a urinalysis. Dr. Mora updated. Bed in lowest position, call light within reach. Patient safety maintained.   "

## 2024-08-07 NOTE — NURSING
Pt cervical exam repeated per Dr. Mora's instructions. Pt cervix is still closed. MD notified of no cervical change. Discharge instructions reviewed with patient. Pt encouraged to increase fluid intake. Pt discharged home for self care. Pt and significant other verbalize understanding and have no further questions at this time.

## 2024-08-07 NOTE — DISCHARGE INSTRUCTIONS
Call clinic [phone] or L & D after hours [phone] for:   vaginal bleeding,   leaking of fluids,   contractions (4-5 in ONE hour),   decreased fetal movements (10 kicks in 2 hours),   headache not relieved by Tylenol,   blurry vision, or   temp of 100.4 or greater.     Begin doing fetal kick counts, at least 10 movements in 2 hours starting at 28 weeks of gestation.     Keep next clinic appointment (see appointment date and time below).

## 2024-08-07 NOTE — NURSING
Patient presents to Labor and Delivery with complaints of abdominal pain and light vaginal bleeding. Pt states that the pain is worse when she is walking, and that the pain had gotten worse on Thursday. She was seen by Dr. Andrews on the 5th and was diagnosed with a yeast infection. She is using Terazol antifungal cream nightly. She is also being followed by [facility] this pregnancy due to her diabetes. She takes a long acting and a rapid acting insulin for her diabetes. VSS. Afebrile. -140. Moderate variability. Mild uterine irritability noted on contraction monitor. Uterus is soft upon palpation. Patient is worried about the baby and the abdominal pain she is experiencing. She has been reassured that it is okay to come get checked out on labor and delivery if she has any concerns. Dr. Andrews notified of the patients arrival to labor and delivery. Patient has no further questions at this time. Bed in lowest position, call light within reach. Patient safety is maintained.

## 2024-08-13 ENCOUNTER — PATIENT MESSAGE (OUTPATIENT)
Dept: OTHER | Facility: OTHER | Age: 34
End: 2024-08-13
Payer: MEDICAID

## 2024-08-13 ENCOUNTER — OFFICE VISIT (OUTPATIENT)
Dept: MATERNAL FETAL MEDICINE | Facility: CLINIC | Age: 34
End: 2024-08-13
Payer: MEDICAID

## 2024-08-13 ENCOUNTER — PROCEDURE VISIT (OUTPATIENT)
Dept: MATERNAL FETAL MEDICINE | Facility: CLINIC | Age: 34
End: 2024-08-13
Payer: MEDICAID

## 2024-08-13 VITALS
WEIGHT: 160.94 LBS | BODY MASS INDEX: 28.52 KG/M2 | HEIGHT: 63 IN | DIASTOLIC BLOOD PRESSURE: 76 MMHG | SYSTOLIC BLOOD PRESSURE: 111 MMHG

## 2024-08-13 DIAGNOSIS — O24.111 TYPE 2 DIABETES MELLITUS AFFECTING PREGNANCY IN FIRST TRIMESTER, ANTEPARTUM: Primary | ICD-10-CM

## 2024-08-13 DIAGNOSIS — Z36.89 ENCOUNTER FOR ULTRASOUND TO ASSESS FETAL GROWTH: ICD-10-CM

## 2024-08-13 DIAGNOSIS — Z36.89 ENCOUNTER FOR ULTRASOUND TO ASSESS FETAL GROWTH: Primary | ICD-10-CM

## 2024-08-13 PROCEDURE — 3008F BODY MASS INDEX DOCD: CPT | Mod: CPTII,,, | Performed by: OBSTETRICS & GYNECOLOGY

## 2024-08-13 PROCEDURE — 76816 OB US FOLLOW-UP PER FETUS: CPT | Mod: PBBFAC | Performed by: OBSTETRICS & GYNECOLOGY

## 2024-08-13 PROCEDURE — 3074F SYST BP LT 130 MM HG: CPT | Mod: CPTII,,, | Performed by: OBSTETRICS & GYNECOLOGY

## 2024-08-13 PROCEDURE — 76816 OB US FOLLOW-UP PER FETUS: CPT | Mod: 26,S$PBB,, | Performed by: OBSTETRICS & GYNECOLOGY

## 2024-08-13 PROCEDURE — 99212 OFFICE O/P EST SF 10 MIN: CPT | Mod: PBBFAC,TH | Performed by: OBSTETRICS & GYNECOLOGY

## 2024-08-13 PROCEDURE — 3044F HG A1C LEVEL LT 7.0%: CPT | Mod: CPTII,,, | Performed by: OBSTETRICS & GYNECOLOGY

## 2024-08-13 PROCEDURE — 99999 PR PBB SHADOW E&M-EST. PATIENT-LVL II: CPT | Mod: PBBFAC,,, | Performed by: OBSTETRICS & GYNECOLOGY

## 2024-08-13 PROCEDURE — 3078F DIAST BP <80 MM HG: CPT | Mod: CPTII,,, | Performed by: OBSTETRICS & GYNECOLOGY

## 2024-08-13 PROCEDURE — 99213 OFFICE O/P EST LOW 20 MIN: CPT | Mod: S$PBB,TH,, | Performed by: OBSTETRICS & GYNECOLOGY

## 2024-08-13 NOTE — PROGRESS NOTES
"Maternal Fetal Medicine follow up consult    SUBJECTIVE:     Sri Gomez is a 34 y.o.  female with IUP at 30w0d who is seen in follow up consultation by MFM.  Pregnancy complications include:   Problem   Type 2 Diabetes Mellitus Affecting Pregnancy in First Trimester, Antepartum       Previous notes reviewed.   No changes to medical, surgical, family, social, or obstetric history.    Interval history since last MFM visit:   Patient has no complaints today. She is overall feeling well.  Patient denies any contractions/cramping, vaginal bleeding or leakage of fluid.  She reports good fetal movement.    Medications:  Current Outpatient Medications   Medication Instructions    aspirin (ECOTRIN) 81 mg, Oral, Daily    blood-glucose sensor (DEXCOM G7 SENSOR) Rena 1 Device, Misc.(Non-Drug; Combo Route), Continuous    famotidine (PEPCID) 40 mg, Oral, Nightly    folic acid (FOLVITE) 400 mcg, Oral, Daily    insulin aspart U-100 (NOVOLOG) 100 unit/mL (3 mL) InPn pen Inject 5 Units into the skin before breakfast AND 6 Units before dinner.    insulin NPH isoph U-100 human (HUMULIN N NPH INSULIN KWIKPEN) 100 unit/mL (3 mL) InPn Inject 22 Units into the skin before breakfast AND 26 Units every evening.    pen needle, diabetic 32 gauge x 5/32" Ndle 1 Syringe, Misc.(Non-Drug; Combo Route), 4 times daily    polyethylene glycol (GLYCOLAX) 17 g, Oral, Daily    senna (SENOKOT) 8.6 mg tablet 1 tablet, Oral, Daily    terconazole (TERAZOL 3) 0.8 % vaginal cream 1 applicator, Vaginal, Nightly    TRUE METRIX GLUCOSE METER Misc use TO test blood glucose FOUR TIMES DAILY    TRUE METRIX GLUCOSE TEST STRIP Strp 1 strip, Subcutaneous, 4 times daily    TRUEPLUS LANCETS 33 gauge Misc 1 lancet , Subcutaneous, 4 times daily     Care team members:  Salvatore - Primary OB     OBJECTIVE:   /76 (BP Location: Left arm, Patient Position: Sitting, BP Method: Medium (Automatic))   Ht 5' 3" (1.6 m)   Wt 73 kg (160 lb 15 " oz)   LMP 01/10/2024 (Approximate)   BMI 28.51 kg/m²     Physical Exam:  Deferred    Ultrasound performed. See viewpoint for full ultrasound report.  Severino live IUP  Fetal size is appropriate for gestational age, with the EFW (1582 g) plotting at the 45% and the AC plotting at the 77%.   A limited repeat fetal anatomic survey appears normal.   The MVP is normal.     Significant labs/imaging:  None new.   Patient did not bring her BS log    ASSESSMENT/PLAN:     34 y.o.  female with IUP at 30w0d    Type 2 diabetes mellitus affecting pregnancy in first trimester, antepartum  Age of Dx:  (in [place] 9 years prior)  Comorbidities: None  Prepregnancy regimen:           Lantus 44u daily  Baseline HgA1c: 12.2% (24)  Last A1c:   Lab Results   Component Value Date    HGBA1C 6.2 (H) 2024     Blood sugar review was unable to be performed as patient did not bring her BS log today. Encouraged patient to remember to bring with every visit or to send via portal on the morning of each visit. Patient reports some persistent elevations at fasting, despite changes to her nighttime insulin made last time. Feeling discouraged. We reviewed placental physiology and the increased resistance to insulin seen later in gestations.    Current regimen (as of 2024):   NPH 22/30  Aspart 5/X/6 WM  FETAL ECHO - completed today and wnl  MATERNAL ECHO - pending  Dietary counseling - done    Recommendations:  No changes to insulin regimen  NPH 22/30  Aspart 5/x/6 WM   Will await patient to send BS log through portal to review and determine if further adjustment will be needed.  Continue to check BS 4x daily  Hypoglycemia precautions were once again reviewed.  F/U check in 2 weeks virtually may be considered pending BS log.  Twice weekly BPP/ NST + AFV starting at 32 weeks  (Should be ordered and arranged by the patient's primary OB provider).  Needs enrollment in connected mom or home BP cuff, if not done    Delivery  timing:  Per prior recs      Patient was counseled that prenatal ultrasound studies have limitations. They do not detect all fetal, genetic, placental, and maternal abnormalities.     FOLLOW UP:   A follow up ultrasound will be made for 4 weeks from today with a follow up MFM MD visit      The patient was given an opportunity to ask questions about the management of her high risk pregnancy problems. She expressed an understanding of and agreement to the above impression and plan. All questions were answered to her satisfaction.        Robinson Joiner MD   Maternal-Fetal Medicine      Electronically Signed by Robinson Joiner August 13, 2024

## 2024-08-13 NOTE — ASSESSMENT & PLAN NOTE
Age of Dx: 2015 (in [place] 9 years prior)  Comorbidities: None  Prepregnancy regimen:           Lantus 44u daily  Baseline HgA1c: 12.2% (2/20/24)  Last A1c:   Lab Results   Component Value Date    HGBA1C 6.2 (H) 06/17/2024     Blood sugar review was unable to be performed as patient did not bring her BS log today. Encouraged patient to remember to bring with every visit or to send via portal on the morning of each visit. Patient reports some persistent elevations at fasting, despite changes to her nighttime insulin made last time. Feeling discouraged. We reviewed placental physiology and the increased resistance to insulin seen later in gestations.    Current regimen (as of 08/13/2024):   NPH 22/30  Aspart 5/X/6 WM  FETAL ECHO - completed today and wnl  MATERNAL ECHO - pending  Dietary counseling - done    Recommendations:  No changes to insulin regimen  NPH 22/30  Aspart 5/x/6 WM   Will await patient to send BS log through portal to review and determine if further adjustment will be needed.  Continue to check BS 4x daily  Hypoglycemia precautions were once again reviewed.  F/U check in 2 weeks virtually may be considered pending BS log.  Twice weekly BPP/ NST + AFV starting at 32 weeks  (Should be ordered and arranged by the patient's primary OB provider).  Needs enrollment in connected mom or home BP cuff, if not done    Delivery timing:  Per prior recs

## 2024-08-19 ENCOUNTER — ROUTINE PRENATAL (OUTPATIENT)
Dept: OBSTETRICS AND GYNECOLOGY | Facility: CLINIC | Age: 34
End: 2024-08-19
Payer: MEDICAID

## 2024-08-19 ENCOUNTER — PROCEDURE VISIT (OUTPATIENT)
Dept: MATERNAL FETAL MEDICINE | Facility: CLINIC | Age: 34
End: 2024-08-19
Payer: MEDICAID

## 2024-08-19 ENCOUNTER — PATIENT MESSAGE (OUTPATIENT)
Dept: MATERNAL FETAL MEDICINE | Facility: CLINIC | Age: 34
End: 2024-08-19
Payer: MEDICAID

## 2024-08-19 VITALS — DIASTOLIC BLOOD PRESSURE: 65 MMHG | SYSTOLIC BLOOD PRESSURE: 93 MMHG | WEIGHT: 165.13 LBS | BODY MASS INDEX: 29.25 KG/M2

## 2024-08-19 DIAGNOSIS — O24.111 TYPE 2 DIABETES MELLITUS AFFECTING PREGNANCY IN FIRST TRIMESTER, ANTEPARTUM: ICD-10-CM

## 2024-08-19 DIAGNOSIS — Z34.03 ENCOUNTER FOR SUPERVISION OF NORMAL FIRST PREGNANCY IN THIRD TRIMESTER: ICD-10-CM

## 2024-08-19 DIAGNOSIS — O24.312 PRE-EXISTING DIABETES MELLITUS DURING PREGNANCY IN SECOND TRIMESTER: ICD-10-CM

## 2024-08-19 DIAGNOSIS — Z3A.30 30 WEEKS GESTATION OF PREGNANCY: Primary | ICD-10-CM

## 2024-08-19 DIAGNOSIS — N89.8 VAGINAL IRRITATION: ICD-10-CM

## 2024-08-19 PROBLEM — Z34.93 ENCOUNTER FOR SUPERVISION OF NORMAL PREGNANCY IN THIRD TRIMESTER: Status: ACTIVE | Noted: 2024-08-19

## 2024-08-19 LAB
BILIRUB SERPL-MCNC: NEGATIVE MG/DL
BLOOD URINE, POC: NORMAL
CLARITY, POC UA: NORMAL
COLOR, POC UA: NORMAL
GLUCOSE UR QL STRIP: 100
KETONES UR QL STRIP: 15
LEUKOCYTE ESTERASE URINE, POC: NORMAL
NITRITE, POC UA: NEGATIVE
PH, POC UA: 6
PROTEIN, POC: 30
SPECIFIC GRAVITY, POC UA: 1.03
UROBILINOGEN, POC UA: 0.2

## 2024-08-19 PROCEDURE — 99213 OFFICE O/P EST LOW 20 MIN: CPT | Mod: PBBFAC,TH,PO | Performed by: STUDENT IN AN ORGANIZED HEALTH CARE EDUCATION/TRAINING PROGRAM

## 2024-08-19 PROCEDURE — 76819 FETAL BIOPHYS PROFIL W/O NST: CPT | Mod: PBBFAC,PO | Performed by: OBSTETRICS & GYNECOLOGY

## 2024-08-19 PROCEDURE — 81514 NFCT DS BV&VAGINITIS DNA ALG: CPT | Performed by: STUDENT IN AN ORGANIZED HEALTH CARE EDUCATION/TRAINING PROGRAM

## 2024-08-19 PROCEDURE — 99999PBSHW POCT URINE DIPSTICK WITHOUT MICROSCOPE: Mod: PBBFAC,,,

## 2024-08-19 PROCEDURE — 99999 PR PBB SHADOW E&M-EST. PATIENT-LVL III: CPT | Mod: PBBFAC,,, | Performed by: STUDENT IN AN ORGANIZED HEALTH CARE EDUCATION/TRAINING PROGRAM

## 2024-08-19 PROCEDURE — 81002 URINALYSIS NONAUTO W/O SCOPE: CPT | Mod: PBBFAC,PO | Performed by: STUDENT IN AN ORGANIZED HEALTH CARE EDUCATION/TRAINING PROGRAM

## 2024-08-19 PROCEDURE — 99213 OFFICE O/P EST LOW 20 MIN: CPT | Mod: TH,S$PBB,, | Performed by: STUDENT IN AN ORGANIZED HEALTH CARE EDUCATION/TRAINING PROGRAM

## 2024-08-19 RX ORDER — INSULIN HUMAN 100 [IU]/ML
INJECTION, SUSPENSION SUBCUTANEOUS
Qty: 12 ML | Refills: 2 | Status: SHIPPED | OUTPATIENT
Start: 2024-08-19 | End: 2025-08-19

## 2024-08-19 RX ORDER — TERCONAZOLE 4 MG/G
1 CREAM VAGINAL NIGHTLY
Qty: 45 G | Refills: 5 | Status: SHIPPED | OUTPATIENT
Start: 2024-08-19 | End: 2024-08-26

## 2024-08-19 RX ORDER — TERCONAZOLE 4 MG/G
1 CREAM VAGINAL NIGHTLY
Qty: 45 G | Refills: 5 | Status: SHIPPED | OUTPATIENT
Start: 2024-08-19 | End: 2024-08-19

## 2024-08-19 NOTE — PROGRESS NOTES
Reviewed Patient's blood sugar log as below:        Last review (8/13): no changes to regimen    Elevated Fastings: 6/6  Elevated post prandial Breakfast: 0/6  Elevated post prandial Lunch: 0/6  Elevated post prandial Dinner: 0/4    Current regimen: NPH 22/30 + Aspart 5/x/6 WM   New Regimen: NPH 22/32 + Aspart 5/x/6 WM     Roxi Clemons PGY5  Maternal Fetal Medicine Fellow

## 2024-08-19 NOTE — PROGRESS NOTES
Chief Complaint   Patient presents with    Routine Prenatal Visit       34 y.o., at 30w6d by Estimated Date of Delivery: 10/22/24    Complaints today: New OB patient to me. Transitioning care from Dr. Andrews who is no longer practicing Obstetrics. No significant complaints at this time apart from concern for vaginal yeast infection.     ROS  OBSTETRICS:   Contractions: n   Bleeding: n   Loss of fluid: n   Fetal movement: y  GASTRO:   Nausea: n   Vomiting: n      OB History    Para Term  AB Living   1             SAB IAB Ectopic Multiple Live Births                  # Outcome Date GA Lbr Juan/2nd Weight Sex Type Anes PTL Lv   1 Current                Dating reviewed  Allergies and problem list reviewed and updated  Medical and surgical history reviewed  Prenatal labs reviewed and updated    PHYSICAL EXAM  BP 93/65   Wt 74.9 kg (165 lb 2 oz)   LMP 01/10/2024 (Approximate)   BMI 29.25 kg/m²     GENERAL: No acute distress  NEURO: Alert and oriented x3  PSYCH: Normal mood and affect  PULMONARY: Non-labored respiration  ABDomen: Soft, gravid, nontender    ASSESSMENT AND PLAN     Problems (from 24 to present)       Problem Noted Resolved    Encounter for supervision of normal pregnancy in third trimester 2024 by Uvaldo Wyatt MD No    Type 2 diabetes mellitus affecting pregnancy in first trimester, antepartum 3/8/2024 by Red Cid III, MD No    Overview Signed 3/8/2024  9:52 AM by Red Cid III, MD     [facility] checklist for antepartum care of pregestational DM  First Trimester  [ ] Record onset age of DM  [ ] History of DM complications  - ketoacidosis, hypoglycemia, gastroparesis, nephropathy, retinopathy, HTN, CAD  [ ] PE of lower extremities  [ ] referral for retinal exam  [ ] Pneumococcal vaccine  [ ] Lab tests  - A1c   - CMP  - TSH  - P/Cr   - EKG if >36 yo or cardiac risk factors present    Second trimester  [ ] ASA-81 between 12 and 28 weeks  [ ] Detailed anatomy US  [ ] Fetal  echo    Third trimester  [ ] Antepartum surveillance at 32-34 weeks     Delivery planning  [ ] US for fetal growth (consider CB if EFW >4500g)  [ ] Delivery timing  - Good glycemic control: 39 0/7 to 39 6/7  - Poor glycemic control: 36 0/7 to 38 6/7  - Complications such as FGR, Pre-E: individualized                 Thorough review of prenatal work-up to date  Insulin managed by M  Antental testing scheduled  Affirm collected today. Exam     labor precautions given  Follow-up: 2 weeks

## 2024-08-22 ENCOUNTER — HOSPITAL ENCOUNTER (OUTPATIENT)
Facility: HOSPITAL | Age: 34
Discharge: HOME | End: 2024-08-22
Attending: STUDENT IN AN ORGANIZED HEALTH CARE EDUCATION/TRAINING PROGRAM
Payer: MEDICAID

## 2024-08-22 VITALS — SYSTOLIC BLOOD PRESSURE: 103 MMHG | HEART RATE: 95 BPM | DIASTOLIC BLOOD PRESSURE: 67 MMHG | OXYGEN SATURATION: 98 %

## 2024-08-22 DIAGNOSIS — Z3A.31 31 WEEKS GESTATION OF PREGNANCY: ICD-10-CM

## 2024-08-22 LAB
ALBUMIN SERPL BCP-MCNC: 2.7 G/DL (ref 3.5–5.2)
ALP SERPL-CCNC: 180 U/L (ref 55–135)
ALT SERPL W/O P-5'-P-CCNC: 7 U/L (ref 10–44)
ANION GAP SERPL CALC-SCNC: 8 MMOL/L (ref 8–16)
AST SERPL-CCNC: 10 U/L (ref 10–40)
BASOPHILS # BLD AUTO: 0.05 K/UL (ref 0–0.2)
BASOPHILS NFR BLD: 0.5 % (ref 0–1.9)
BILIRUB SERPL-MCNC: 0.3 MG/DL (ref 0.1–1)
BUN SERPL-MCNC: 9 MG/DL (ref 6–20)
CALCIUM SERPL-MCNC: 9 MG/DL (ref 8.7–10.5)
CHLORIDE SERPL-SCNC: 111 MMOL/L (ref 95–110)
CO2 SERPL-SCNC: 16 MMOL/L (ref 23–29)
CREAT SERPL-MCNC: 0.7 MG/DL (ref 0.5–1.4)
DIFFERENTIAL METHOD BLD: ABNORMAL
EOSINOPHIL # BLD AUTO: 0.1 K/UL (ref 0–0.5)
EOSINOPHIL NFR BLD: 0.8 % (ref 0–8)
ERYTHROCYTE [DISTWIDTH] IN BLOOD BY AUTOMATED COUNT: 14.9 % (ref 11.5–14.5)
EST. GFR  (NO RACE VARIABLE): >60 ML/MIN/1.73 M^2
GLUCOSE SERPL-MCNC: 92 MG/DL (ref 70–110)
HCT VFR BLD AUTO: 31.4 % (ref 37–48.5)
HGB BLD-MCNC: 10.2 G/DL (ref 12–16)
IMM GRANULOCYTES # BLD AUTO: 0.05 K/UL (ref 0–0.04)
IMM GRANULOCYTES NFR BLD AUTO: 0.5 % (ref 0–0.5)
INFLUENZA A, MOLECULAR: NEGATIVE
INFLUENZA B, MOLECULAR: NEGATIVE
LYMPHOCYTES # BLD AUTO: 2.1 K/UL (ref 1–4.8)
LYMPHOCYTES NFR BLD: 19 % (ref 18–48)
MCH RBC QN AUTO: 24.9 PG (ref 27–31)
MCHC RBC AUTO-ENTMCNC: 32.5 G/DL (ref 32–36)
MCV RBC AUTO: 77 FL (ref 82–98)
MONOCYTES # BLD AUTO: 0.5 K/UL (ref 0.3–1)
MONOCYTES NFR BLD: 4.8 % (ref 4–15)
NEUTROPHILS # BLD AUTO: 8.2 K/UL (ref 1.8–7.7)
NEUTROPHILS NFR BLD: 74.4 % (ref 38–73)
NRBC BLD-RTO: 0 /100 WBC
PLATELET # BLD AUTO: 401 K/UL (ref 150–450)
PMV BLD AUTO: 10.5 FL (ref 9.2–12.9)
POCT GLUCOSE: 83 MG/DL (ref 70–110)
POTASSIUM SERPL-SCNC: 4.2 MMOL/L (ref 3.5–5.1)
PROT SERPL-MCNC: 6.8 G/DL (ref 6–8.4)
RBC # BLD AUTO: 4.1 M/UL (ref 4–5.4)
SARS-COV-2 RDRP RESP QL NAA+PROBE: NEGATIVE
SODIUM SERPL-SCNC: 135 MMOL/L (ref 136–145)
SPECIMEN SOURCE: NORMAL
WBC # BLD AUTO: 11.01 K/UL (ref 3.9–12.7)

## 2024-08-22 PROCEDURE — 63600175 PHARM REV CODE 636 W HCPCS: Performed by: STUDENT IN AN ORGANIZED HEALTH CARE EDUCATION/TRAINING PROGRAM

## 2024-08-22 PROCEDURE — 80053 COMPREHEN METABOLIC PANEL: CPT | Performed by: STUDENT IN AN ORGANIZED HEALTH CARE EDUCATION/TRAINING PROGRAM

## 2024-08-22 PROCEDURE — 59025 FETAL NON-STRESS TEST: CPT

## 2024-08-22 PROCEDURE — 85025 COMPLETE CBC W/AUTO DIFF WBC: CPT | Performed by: STUDENT IN AN ORGANIZED HEALTH CARE EDUCATION/TRAINING PROGRAM

## 2024-08-22 PROCEDURE — 87502 INFLUENZA DNA AMP PROBE: CPT | Performed by: STUDENT IN AN ORGANIZED HEALTH CARE EDUCATION/TRAINING PROGRAM

## 2024-08-22 PROCEDURE — 25000003 PHARM REV CODE 250: Performed by: STUDENT IN AN ORGANIZED HEALTH CARE EDUCATION/TRAINING PROGRAM

## 2024-08-22 PROCEDURE — 99211 OFF/OP EST MAY X REQ PHY/QHP: CPT

## 2024-08-22 PROCEDURE — 36415 COLL VENOUS BLD VENIPUNCTURE: CPT | Performed by: STUDENT IN AN ORGANIZED HEALTH CARE EDUCATION/TRAINING PROGRAM

## 2024-08-22 PROCEDURE — U0002 COVID-19 LAB TEST NON-CDC: HCPCS | Performed by: STUDENT IN AN ORGANIZED HEALTH CARE EDUCATION/TRAINING PROGRAM

## 2024-08-22 RX ORDER — ONDANSETRON 8 MG/1
8 TABLET, ORALLY DISINTEGRATING ORAL EVERY 8 HOURS PRN
Status: DISCONTINUED | OUTPATIENT
Start: 2024-08-22 | End: 2024-08-22 | Stop reason: HOSPADM

## 2024-08-22 RX ORDER — SODIUM CHLORIDE, SODIUM LACTATE, POTASSIUM CHLORIDE, CALCIUM CHLORIDE 600; 310; 30; 20 MG/100ML; MG/100ML; MG/100ML; MG/100ML
INJECTION, SOLUTION INTRAVENOUS CONTINUOUS
Status: DISCONTINUED | OUTPATIENT
Start: 2024-08-22 | End: 2024-08-22 | Stop reason: HOSPADM

## 2024-08-22 RX ORDER — ACETAMINOPHEN 500 MG
500 TABLET ORAL EVERY 6 HOURS PRN
Status: DISCONTINUED | OUTPATIENT
Start: 2024-08-22 | End: 2024-08-22 | Stop reason: HOSPADM

## 2024-08-22 RX ADMIN — SODIUM CHLORIDE, POTASSIUM CHLORIDE, SODIUM LACTATE AND CALCIUM CHLORIDE: 600; 310; 30; 20 INJECTION, SOLUTION INTRAVENOUS at 01:08

## 2024-08-22 RX ADMIN — ONDANSETRON 8 MG: 8 TABLET, ORALLY DISINTEGRATING ORAL at 03:08

## 2024-08-22 NOTE — DISCHARGE INSTRUCTIONS
Call clinic [phone] or L & D after hours at [phone] for vaginal bleeding, leakage of fluids, contractions 4-5 in one hour, decreased fetal movements ( 10 kicks in 2 hours), headache not relieved by Tylenol, blurry vision, or temp of 100.4 or greater.  Begin doing fetal kick counts, at least 10 movements in 2 hours starting at 28 weeks gestation.  Keep next clinic appointment

## 2024-08-22 NOTE — PROGRESS NOTES
OB/GYN Labor and Delivery Triage Note    Subjective:  Pt presents with CC of: NV     denies Contraction,  Vaginal bleeding,  Leakage of fluid,  Symptoms of pre-eclampsia,  reports good fetal movement    Pregnancy is otherwise complicated by: T2DM on insulin     Her OBGYN is: Dr. Wyatt   Next appointment is:  US,  NST, 9/3 PEYMAN    Objective:  /67   Pulse 95   LMP 01/10/2024 (Approximate)   SpO2 98%       Fetal Heart Tracing  Baseline: 145  Variability: mod  Accelerations: +  Decelerations: -    Contractions:  [place]: irritability           Assessment and Plan:   34 y.o.F  with IUP@ 31w2d        N/V  -BG 83, CMP Na 135 K 4.2   -received IVF LR and IV meds > NV improved   -NST reviewed, reactive  -Dispo: home         Carline Andino MD

## 2024-08-22 NOTE — PROGRESS NOTES
Pt presented to labor and delivery c/o nausea. Pt given iv hydration and labs were placed covid and flu sent. All labs were negative.pt sent home with nausea meds and dr ball was notified of pts status. Pt will fu with dr rosales at next scheduled appt

## 2024-08-23 LAB
BACTERIAL VAGINOSIS DNA: POSITIVE
CANDIDA GLABRATA DNA: NEGATIVE
CANDIDA KRUSEI DNA: NEGATIVE
CANDIDA RRNA VAG QL PROBE: POSITIVE
T VAGINALIS RRNA GENITAL QL PROBE: NEGATIVE

## 2024-08-26 ENCOUNTER — PROCEDURE VISIT (OUTPATIENT)
Dept: MATERNAL FETAL MEDICINE | Facility: CLINIC | Age: 34
End: 2024-08-26
Payer: MEDICAID

## 2024-08-26 DIAGNOSIS — O24.312 PRE-EXISTING DIABETES MELLITUS DURING PREGNANCY IN SECOND TRIMESTER: ICD-10-CM

## 2024-08-26 PROCEDURE — 76819 FETAL BIOPHYS PROFIL W/O NST: CPT | Mod: PBBFAC,PO | Performed by: OBSTETRICS & GYNECOLOGY

## 2024-08-27 ENCOUNTER — PATIENT MESSAGE (OUTPATIENT)
Dept: OTHER | Facility: OTHER | Age: 34
End: 2024-08-27
Payer: MEDICAID

## 2024-08-29 ENCOUNTER — HOSPITAL ENCOUNTER (OUTPATIENT)
Dept: OBSTETRICS AND GYNECOLOGY | Facility: HOSPITAL | Age: 34
Discharge: HOME | End: 2024-08-29
Payer: MEDICAID

## 2024-08-29 VITALS
SYSTOLIC BLOOD PRESSURE: 108 MMHG | HEART RATE: 87 BPM | OXYGEN SATURATION: 99 % | DIASTOLIC BLOOD PRESSURE: 72 MMHG | RESPIRATION RATE: 19 BRPM

## 2024-08-29 DIAGNOSIS — O24.312 PRE-EXISTING DIABETES MELLITUS DURING PREGNANCY IN SECOND TRIMESTER: ICD-10-CM

## 2024-08-29 LAB
ACCELERATIONS > 10BPM: NORMAL
ACCELERATIONS > 15 BPM: NORMAL
ACOUSTIC STIMULATION: NORMAL
DECELERATIONS: NORMAL
FHR VARIABILITIES: NORMAL
NST ASSESSMENT: NORMAL
NST BASELINE: NORMAL
NST DURATION: NORMAL
NST INDICATIONS: NORMAL
NST LOCATIONS: NORMAL
NST READ BY: NORMAL
NST RETURN: NORMAL
UTERINE ACTIVITY: NORMAL

## 2024-08-29 PROCEDURE — 59025 FETAL NON-STRESS TEST: CPT

## 2024-09-03 ENCOUNTER — ROUTINE PRENATAL (OUTPATIENT)
Dept: OBSTETRICS AND GYNECOLOGY | Facility: CLINIC | Age: 34
End: 2024-09-03
Payer: MEDICAID

## 2024-09-03 ENCOUNTER — PROCEDURE VISIT (OUTPATIENT)
Dept: MATERNAL FETAL MEDICINE | Facility: CLINIC | Age: 34
End: 2024-09-03
Payer: MEDICAID

## 2024-09-03 VITALS — WEIGHT: 164.69 LBS | BODY MASS INDEX: 29.17 KG/M2

## 2024-09-03 DIAGNOSIS — Z34.03 ENCOUNTER FOR SUPERVISION OF NORMAL FIRST PREGNANCY IN THIRD TRIMESTER: ICD-10-CM

## 2024-09-03 DIAGNOSIS — Z3A.33 33 WEEKS GESTATION OF PREGNANCY: Primary | ICD-10-CM

## 2024-09-03 DIAGNOSIS — O24.111 TYPE 2 DIABETES MELLITUS AFFECTING PREGNANCY IN FIRST TRIMESTER, ANTEPARTUM: ICD-10-CM

## 2024-09-03 DIAGNOSIS — O24.312 PRE-EXISTING DIABETES MELLITUS DURING PREGNANCY IN SECOND TRIMESTER: ICD-10-CM

## 2024-09-03 LAB
BILIRUB SERPL-MCNC: NEGATIVE MG/DL
BLOOD URINE, POC: NORMAL
CLARITY, POC UA: NORMAL
COLOR, POC UA: NORMAL
GLUCOSE UR QL STRIP: 100
KETONES UR QL STRIP: NEGATIVE
LEUKOCYTE ESTERASE URINE, POC: NORMAL
NITRITE, POC UA: NEGATIVE
PH, POC UA: NORMAL
PROTEIN, POC: NORMAL
SPECIFIC GRAVITY, POC UA: NORMAL
UROBILINOGEN, POC UA: NEGATIVE

## 2024-09-03 PROCEDURE — 99213 OFFICE O/P EST LOW 20 MIN: CPT | Mod: PBBFAC,TH,PO | Performed by: STUDENT IN AN ORGANIZED HEALTH CARE EDUCATION/TRAINING PROGRAM

## 2024-09-03 PROCEDURE — 99999PBSHW POCT URINE DIPSTICK WITHOUT MICROSCOPE: Mod: PBBFAC,,,

## 2024-09-03 PROCEDURE — 99999 PR PBB SHADOW E&M-EST. PATIENT-LVL III: CPT | Mod: PBBFAC,,, | Performed by: STUDENT IN AN ORGANIZED HEALTH CARE EDUCATION/TRAINING PROGRAM

## 2024-09-03 PROCEDURE — 76819 FETAL BIOPHYS PROFIL W/O NST: CPT | Mod: PBBFAC,PO | Performed by: STUDENT IN AN ORGANIZED HEALTH CARE EDUCATION/TRAINING PROGRAM

## 2024-09-03 PROCEDURE — 81002 URINALYSIS NONAUTO W/O SCOPE: CPT | Mod: PBBFAC,PO | Performed by: STUDENT IN AN ORGANIZED HEALTH CARE EDUCATION/TRAINING PROGRAM

## 2024-09-03 RX ORDER — FERROUS SULFATE 325(65) MG
325 TABLET, DELAYED RELEASE (ENTERIC COATED) ORAL DAILY
Qty: 90 TABLET | Refills: 0 | Status: SHIPPED | OUTPATIENT
Start: 2024-09-03 | End: 2024-12-02

## 2024-09-03 NOTE — PROGRESS NOTES
Chief Complaint   Patient presents with    Routine Prenatal Visit       34 y.o., at 33w0d by Estimated Date of Delivery: 10/22/24    Complaints today: Patient doing well. Bloods sugars appear to be well managed at this time. Has M follow-up.     ROS  OBSTETRICS:   Contractions: n   Bleeding: n   Loss of fluid: n   Fetal movement: y  GASTRO:   Nausea: n   Vomiting: n      OB History    Para Term  AB Living   1             SAB IAB Ectopic Multiple Live Births                  # Outcome Date GA Lbr Juan/2nd Weight Sex Type Anes PTL Lv   1 Current                Dating reviewed  Allergies and problem list reviewed and updated  Medical and surgical history reviewed  Prenatal labs reviewed and updated    PHYSICAL EXAM  Wt 74.7 kg (164 lb 10.9 oz)   LMP 01/10/2024 (Approximate)   BMI 29.17 kg/m²     GENERAL: No acute distress  NEURO: Alert and oriented x3  PSYCH: Normal mood and affect  PULMONARY: Non-labored respiration  ABDomen: Soft, gravid, nontender    ASSESSMENT AND PLAN     Problems (from 24 to present)       Problem Noted Resolved    Encounter for supervision of normal pregnancy in third trimester 2024 by Uvaldo Wyatt MD No    Type 2 diabetes mellitus affecting pregnancy in first trimester, antepartum 3/8/2024 by Red Cid III, MD No    Overview Signed 3/8/2024  9:52 AM by Red Cid III, MD     [facility] checklist for antepartum care of pregestational DM  First Trimester  [ ] Record onset age of DM  [ ] History of DM complications  - ketoacidosis, hypoglycemia, gastroparesis, nephropathy, retinopathy, HTN, CAD  [ ] PE of lower extremities  [ ] referral for retinal exam  [ ] Pneumococcal vaccine  [ ] Lab tests  - A1c   - CMP  - TSH  - P/Cr   - EKG if >34 yo or cardiac risk factors present    Second trimester  [ ] ASA-81 between 12 and 28 weeks  [ ] Detailed anatomy US  [ ] Fetal echo    Third trimester  [ ] Antepartum surveillance at 32-34 weeks     Delivery planning  [ ] US  for fetal growth (consider CB if EFW >4500g)  [ ] Delivery timing  - Good glycemic control: 39 0/7 to 39 6/7  - Poor glycemic control: 36 0/7 to 38 6/7  - Complications such as FGR, Pre-E: individualized                 Continue insulin regimen per MFM   testing  Consents signed     labor precautions given  Follow-up: 2 weeks

## 2024-09-05 ENCOUNTER — HOSPITAL ENCOUNTER (OUTPATIENT)
Dept: OBSTETRICS AND GYNECOLOGY | Facility: HOSPITAL | Age: 34
Discharge: HOME | End: 2024-09-05
Payer: MEDICAID

## 2024-09-05 VITALS
HEART RATE: 93 BPM | RESPIRATION RATE: 18 BRPM | SYSTOLIC BLOOD PRESSURE: 102 MMHG | OXYGEN SATURATION: 99 % | DIASTOLIC BLOOD PRESSURE: 63 MMHG

## 2024-09-05 DIAGNOSIS — O24.312 PRE-EXISTING DIABETES MELLITUS DURING PREGNANCY IN SECOND TRIMESTER: ICD-10-CM

## 2024-09-05 PROCEDURE — 59025 FETAL NON-STRESS TEST: CPT

## 2024-09-07 ENCOUNTER — HOSPITAL ENCOUNTER (OUTPATIENT)
Facility: HOSPITAL | Age: 34
Discharge: HOME | End: 2024-09-07
Attending: STUDENT IN AN ORGANIZED HEALTH CARE EDUCATION/TRAINING PROGRAM | Admitting: STUDENT IN AN ORGANIZED HEALTH CARE EDUCATION/TRAINING PROGRAM
Payer: MEDICAID

## 2024-09-07 VITALS
DIASTOLIC BLOOD PRESSURE: 66 MMHG | SYSTOLIC BLOOD PRESSURE: 104 MMHG | OXYGEN SATURATION: 100 % | HEART RATE: 78 BPM | TEMPERATURE: 98 F | RESPIRATION RATE: 18 BRPM

## 2024-09-07 DIAGNOSIS — Z3A.33 33 WEEKS GESTATION OF PREGNANCY: ICD-10-CM

## 2024-09-07 LAB
ALBUMIN SERPL BCP-MCNC: 2.5 G/DL (ref 3.5–5.2)
ALP SERPL-CCNC: 178 U/L (ref 55–135)
ALT SERPL W/O P-5'-P-CCNC: 6 U/L (ref 10–44)
ANION GAP SERPL CALC-SCNC: 7 MMOL/L (ref 8–16)
AST SERPL-CCNC: 8 U/L (ref 10–40)
BASOPHILS # BLD AUTO: 0.05 K/UL (ref 0–0.2)
BASOPHILS NFR BLD: 0.5 % (ref 0–1.9)
BILIRUB SERPL-MCNC: 0.3 MG/DL (ref 0.1–1)
BUN SERPL-MCNC: 8 MG/DL (ref 6–20)
CALCIUM SERPL-MCNC: 8.8 MG/DL (ref 8.7–10.5)
CHLORIDE SERPL-SCNC: 111 MMOL/L (ref 95–110)
CO2 SERPL-SCNC: 16 MMOL/L (ref 23–29)
CREAT SERPL-MCNC: 0.7 MG/DL (ref 0.5–1.4)
DIFFERENTIAL METHOD BLD: ABNORMAL
EOSINOPHIL # BLD AUTO: 0.1 K/UL (ref 0–0.5)
EOSINOPHIL NFR BLD: 1.1 % (ref 0–8)
ERYTHROCYTE [DISTWIDTH] IN BLOOD BY AUTOMATED COUNT: 15.4 % (ref 11.5–14.5)
EST. GFR  (NO RACE VARIABLE): >60 ML/MIN/1.73 M^2
GLUCOSE SERPL-MCNC: 86 MG/DL (ref 70–110)
HCT VFR BLD AUTO: 29.5 % (ref 37–48.5)
HGB BLD-MCNC: 9.8 G/DL (ref 12–16)
IMM GRANULOCYTES # BLD AUTO: 0.04 K/UL (ref 0–0.04)
IMM GRANULOCYTES NFR BLD AUTO: 0.4 % (ref 0–0.5)
LYMPHOCYTES # BLD AUTO: 3.3 K/UL (ref 1–4.8)
LYMPHOCYTES NFR BLD: 30.9 % (ref 18–48)
MCH RBC QN AUTO: 24.9 PG (ref 27–31)
MCHC RBC AUTO-ENTMCNC: 33.2 G/DL (ref 32–36)
MCV RBC AUTO: 75 FL (ref 82–98)
MONOCYTES # BLD AUTO: 0.5 K/UL (ref 0.3–1)
MONOCYTES NFR BLD: 4.9 % (ref 4–15)
NEUTROPHILS # BLD AUTO: 6.6 K/UL (ref 1.8–7.7)
NEUTROPHILS NFR BLD: 62.2 % (ref 38–73)
NRBC BLD-RTO: 0 /100 WBC
PLATELET # BLD AUTO: 351 K/UL (ref 150–450)
PMV BLD AUTO: 10.4 FL (ref 9.2–12.9)
POTASSIUM SERPL-SCNC: 3.9 MMOL/L (ref 3.5–5.1)
PROT SERPL-MCNC: 6.3 G/DL (ref 6–8.4)
RBC # BLD AUTO: 3.93 M/UL (ref 4–5.4)
SODIUM SERPL-SCNC: 134 MMOL/L (ref 136–145)
WBC # BLD AUTO: 10.64 K/UL (ref 3.9–12.7)

## 2024-09-07 PROCEDURE — 85025 COMPLETE CBC W/AUTO DIFF WBC: CPT | Performed by: STUDENT IN AN ORGANIZED HEALTH CARE EDUCATION/TRAINING PROGRAM

## 2024-09-07 PROCEDURE — 25000003 PHARM REV CODE 250: Performed by: OBSTETRICS & GYNECOLOGY

## 2024-09-07 PROCEDURE — 80053 COMPREHEN METABOLIC PANEL: CPT | Performed by: STUDENT IN AN ORGANIZED HEALTH CARE EDUCATION/TRAINING PROGRAM

## 2024-09-07 PROCEDURE — 99211 OFF/OP EST MAY X REQ PHY/QHP: CPT | Mod: 25

## 2024-09-07 PROCEDURE — 63600175 PHARM REV CODE 636 W HCPCS: Performed by: OBSTETRICS & GYNECOLOGY

## 2024-09-07 PROCEDURE — 36415 COLL VENOUS BLD VENIPUNCTURE: CPT | Performed by: STUDENT IN AN ORGANIZED HEALTH CARE EDUCATION/TRAINING PROGRAM

## 2024-09-07 PROCEDURE — 59025 FETAL NON-STRESS TEST: CPT

## 2024-09-07 RX ORDER — ALUMINUM HYDROXIDE, MAGNESIUM HYDROXIDE, AND SIMETHICONE 1200; 120; 1200 MG/30ML; MG/30ML; MG/30ML
30 SUSPENSION ORAL ONCE AS NEEDED
Status: COMPLETED | OUTPATIENT
Start: 2024-09-07 | End: 2024-09-07

## 2024-09-07 RX ORDER — FAMOTIDINE 20 MG/1
40 TABLET, FILM COATED ORAL ONCE
Status: COMPLETED | OUTPATIENT
Start: 2024-09-07 | End: 2024-09-07

## 2024-09-07 RX ADMIN — ALUMINUM HYDROXIDE, MAGNESIUM HYDROXIDE, AND SIMETHICONE 30 ML: 1200; 120; 1200 SUSPENSION ORAL at 08:09

## 2024-09-07 RX ADMIN — FAMOTIDINE 40 MG: 20 TABLET ORAL at 06:09

## 2024-09-07 RX ADMIN — SODIUM CHLORIDE, POTASSIUM CHLORIDE, SODIUM LACTATE AND CALCIUM CHLORIDE 1000 ML: 600; 310; 30; 20 INJECTION, SOLUTION INTRAVENOUS at 06:09

## 2024-09-07 NOTE — NURSING
ambulatory to unit with complaints of upper abdominal pain that started last night at 2100. Pain worsen this AM, reports the pain as a sharp, constant pain radiating from umbilicus to epigastric area. Denies vaginal bleeding or leaking of fluid. Reports good fetal movement. Had soup and water only today. Reports history of diabetes and reports taking insulin and checking blood glucose today as ordered.

## 2024-09-08 DIAGNOSIS — O24.111 TYPE 2 DIABETES MELLITUS AFFECTING PREGNANCY IN FIRST TRIMESTER, ANTEPARTUM: ICD-10-CM

## 2024-09-08 NOTE — DISCHARGE INSTRUCTIONS
1 Dolor de usha intenso que no se leeanna con edgar dosis de Tylenol.    2 Vision borrosa o henrik manchas noé de pineda ojos.    3 Ninchazon repentino en la anusha o jose.     4 Aumento de peso en solo unos pocos vance.    5 Dolor de estomago o calambres.    6 Vomito que dura mas de 24 horas.    7 Fiebre mas de 100.4 grados.    8 Sangrado vaginal que es algo mas que manonar.    9 Secrecion vaginal excesiva e in usual.    10 Un flujo de liquido acvoso de la vagina.    11 Avsencia de movmiento del kylah significohiva comenzando en 24-36 semanas.    12 Menos de 37 semanas: mas de 4 contracciones en edgar hora por 2 horas    13 Mas de 37 semanas: contracciones coda 5 minutos por 2 horas.    14 Iron 8-10 vasos.    Sequimiento con fontenot medico cadacita.

## 2024-09-08 NOTE — NURSING
Maalox ineffective at relieving epigastric pain, dr choi notified. Labs ordered and u/s for possible gallbladder involvement.

## 2024-09-09 ENCOUNTER — OFFICE VISIT (OUTPATIENT)
Dept: MATERNAL FETAL MEDICINE | Facility: CLINIC | Age: 34
End: 2024-09-09
Payer: MEDICAID

## 2024-09-09 ENCOUNTER — HOSPITAL ENCOUNTER (EMERGENCY)
Facility: OTHER | Age: 34
Discharge: HOME | End: 2024-09-09
Attending: OBSTETRICS & GYNECOLOGY
Payer: MEDICAID

## 2024-09-09 ENCOUNTER — TELEPHONE (OUTPATIENT)
Dept: MATERNAL FETAL MEDICINE | Facility: CLINIC | Age: 34
End: 2024-09-09
Payer: MEDICAID

## 2024-09-09 ENCOUNTER — PROCEDURE VISIT (OUTPATIENT)
Dept: MATERNAL FETAL MEDICINE | Facility: CLINIC | Age: 34
End: 2024-09-09
Payer: MEDICAID

## 2024-09-09 ENCOUNTER — PATIENT MESSAGE (OUTPATIENT)
Dept: MATERNAL FETAL MEDICINE | Facility: CLINIC | Age: 34
End: 2024-09-09

## 2024-09-09 VITALS
DIASTOLIC BLOOD PRESSURE: 70 MMHG | OXYGEN SATURATION: 100 % | RESPIRATION RATE: 18 BRPM | TEMPERATURE: 98 F | SYSTOLIC BLOOD PRESSURE: 120 MMHG | HEART RATE: 88 BPM

## 2024-09-09 VITALS
BODY MASS INDEX: 29.51 KG/M2 | HEART RATE: 92 BPM | WEIGHT: 166.56 LBS | DIASTOLIC BLOOD PRESSURE: 78 MMHG | SYSTOLIC BLOOD PRESSURE: 110 MMHG | HEIGHT: 63 IN

## 2024-09-09 DIAGNOSIS — O21.9 NAUSEA AND VOMITING DURING PREGNANCY: ICD-10-CM

## 2024-09-09 DIAGNOSIS — Z3A.33 33 WEEKS GESTATION OF PREGNANCY: ICD-10-CM

## 2024-09-09 DIAGNOSIS — Z36.89 ENCOUNTER FOR ULTRASOUND TO CHECK FETAL GROWTH: Primary | ICD-10-CM

## 2024-09-09 DIAGNOSIS — O47.03 PRETERM UTERINE CONTRACTIONS IN THIRD TRIMESTER, ANTEPARTUM: Primary | ICD-10-CM

## 2024-09-09 DIAGNOSIS — Z36.89 ENCOUNTER FOR ULTRASOUND TO ASSESS FETAL GROWTH: ICD-10-CM

## 2024-09-09 DIAGNOSIS — O24.111 TYPE 2 DIABETES MELLITUS AFFECTING PREGNANCY IN FIRST TRIMESTER, ANTEPARTUM: Primary | ICD-10-CM

## 2024-09-09 LAB
BILIRUB SERPL-MCNC: NORMAL MG/DL
BLOOD URINE, POC: NORMAL
COLOR, POC UA: YELLOW
GLUCOSE UR QL STRIP: 100
KETONES UR QL STRIP: NORMAL
LEUKOCYTE ESTERASE URINE, POC: NORMAL
NITRITE, POC UA: NORMAL
PH, POC UA: 6
PROTEIN, POC: NORMAL
SPECIFIC GRAVITY, POC UA: 1.01
UROBILINOGEN, POC UA: NORMAL

## 2024-09-09 PROCEDURE — 99284 EMERGENCY DEPT VISIT MOD MDM: CPT | Mod: 25,,, | Performed by: OBSTETRICS & GYNECOLOGY

## 2024-09-09 PROCEDURE — 59025 FETAL NON-STRESS TEST: CPT

## 2024-09-09 PROCEDURE — 81002 URINALYSIS NONAUTO W/O SCOPE: CPT

## 2024-09-09 PROCEDURE — 99284 EMERGENCY DEPT VISIT MOD MDM: CPT | Mod: 25,27

## 2024-09-09 PROCEDURE — 99213 OFFICE O/P EST LOW 20 MIN: CPT | Mod: PBBFAC,TH | Performed by: OBSTETRICS & GYNECOLOGY

## 2024-09-09 PROCEDURE — 59025 FETAL NON-STRESS TEST: CPT | Mod: 26,,, | Performed by: OBSTETRICS & GYNECOLOGY

## 2024-09-09 PROCEDURE — 76819 FETAL BIOPHYS PROFIL W/O NST: CPT | Mod: PBBFAC | Performed by: OBSTETRICS & GYNECOLOGY

## 2024-09-09 PROCEDURE — 76816 OB US FOLLOW-UP PER FETUS: CPT | Mod: PBBFAC | Performed by: OBSTETRICS & GYNECOLOGY

## 2024-09-09 PROCEDURE — 99999 PR PBB SHADOW E&M-EST. PATIENT-LVL III: CPT | Mod: PBBFAC,,, | Performed by: OBSTETRICS & GYNECOLOGY

## 2024-09-09 RX ORDER — INSULIN HUMAN 100 [IU]/ML
INJECTION, SUSPENSION SUBCUTANEOUS
Qty: 12 ML | Refills: 2 | Status: SHIPPED | OUTPATIENT
Start: 2024-09-09 | End: 2025-09-09

## 2024-09-09 RX ORDER — METOCLOPRAMIDE 10 MG/1
10 TABLET ORAL EVERY 6 HOURS PRN
Qty: 30 TABLET | Refills: 1 | Status: SHIPPED | OUTPATIENT
Start: 2024-09-09 | End: 2025-09-09

## 2024-09-09 RX ORDER — METFORMIN HYDROCHLORIDE 500 MG/1
TABLET ORAL
COMMUNITY
Start: 2023-09-15 | End: 2024-09-09 | Stop reason: ALTCHOICE

## 2024-09-09 RX ORDER — CALCIUM CITRATE/VITAMIN D3 200MG-6.25
1 TABLET ORAL 4 TIMES DAILY
Qty: 200 EACH | Refills: 2 | Status: SHIPPED | OUTPATIENT
Start: 2024-09-09

## 2024-09-09 RX ORDER — INSULIN ASPART 100 [IU]/ML
INJECTION, SOLUTION INTRAVENOUS; SUBCUTANEOUS
Qty: 9 ML | Refills: 4 | Status: SHIPPED | OUTPATIENT
Start: 2024-09-09 | End: 2025-09-09

## 2024-09-09 RX ORDER — INSULIN DETEMIR 100 [IU]/ML
INJECTION, SOLUTION SUBCUTANEOUS
COMMUNITY
Start: 2024-08-20 | End: 2024-09-09 | Stop reason: ALTCHOICE

## 2024-09-09 RX ORDER — METFORMIN HYDROCHLORIDE 850 MG/1
TABLET ORAL
COMMUNITY
Start: 2023-08-16

## 2024-09-09 RX ORDER — PEN NEEDLE, DIABETIC 30 GX3/16"
1 NEEDLE, DISPOSABLE MISCELLANEOUS 4 TIMES DAILY
Qty: 200 EACH | Refills: 3 | Status: SHIPPED | OUTPATIENT
Start: 2024-09-09

## 2024-09-09 NOTE — DISCHARGE INSTRUCTIONS
Contact your primary OB or after hours at [phone] (option 1) if you experience any of the following:    Contractions every 7-10 minutes for 1 or more hours.   A sudden gush or constant leaking of fluid.  Heavy vaginal bleeding.   If you experience a constant headache, blurry vision, pain underneath your right rib, or sudden swelling of your hands, feet, and face.   If you are 28 weeks pregnant or greater, you can measure kick counts with a goal of 10 or more movements within 2 hours.     Remember to stay hydrated; drink 8-10 bottles of water a day.     Maintain all follow-up appointments.

## 2024-09-09 NOTE — PROGRESS NOTES
"Maternal Fetal Medicine follow up consult    SUBJECTIVE:     Sri Gomez is a 34 y.o.  female with IUP at 33w6d who is seen in follow up consultation by MFM.  Pregnancy complications include:   Problem   Type 2 Diabetes Mellitus Affecting Pregnancy in First Trimester, Antepartum     Previous notes reviewed.   No changes to medical, surgical, family, social, or obstetric history.    Interval history since last MFM visit:   Reports ongoing contractions since late last week. Reports pain is more generalized in her abdomen and happening every 30 min. Occasionally accompanied by N/V. Had [facility] visit on  for upper abd pain and was discharged home after neg US.  Patient denies any vaginal bleeding or leakage of fluid.  She reports good fetal movement.    Medications:  Current Outpatient Medications   Medication Instructions    aspirin (ECOTRIN) 81 mg, Oral, Daily    blood-glucose sensor (DEXCOM G7 SENSOR) Rena 1 Device, Misc.(Non-Drug; Combo Route), Continuous    famotidine (PEPCID) 40 mg, Oral, Nightly    ferrous sulfate 325 mg, Oral, Daily    folic acid (FOLVITE) 400 mcg, Oral, Daily    insulin aspart U-100 (NOVOLOG) 100 unit/mL (3 mL) InPn pen Inject 5 Units into the skin before breakfast AND 6 Units before dinner.    insulin NPH isoph U-100 human (HUMULIN N NPH INSULIN KWIKPEN) 100 unit/mL (3 mL) InPn Inject 22 Units into the skin before breakfast AND 38 Units every evening.    metFORMIN (GLUCOPHAGE) 850 MG tablet 1 tablet with breakfast Orally Once a day    metoclopramide HCl (REGLAN) 10 mg, Oral, Every 6 hours PRN    multivit with minerals/lutein (MULTIVITAMIN 50 PLUS ORAL) Oral    pen needle, diabetic 32 gauge x 5/32" Ndle 1 Syringe, Misc.(Non-Drug; Combo Route), 4 times daily    polyethylene glycol (GLYCOLAX) 17 g, Oral, Daily    senna (SENOKOT) 8.6 mg tablet 1 tablet, Oral, Daily    terconazole (TERAZOL 3) 0.8 % vaginal cream 1 applicator, Vaginal, Nightly    TRUE METRIX GLUCOSE METER " "Misc use TO test blood glucose FOUR TIMES DAILY    TRUE METRIX GLUCOSE TEST STRIP Strp 1 strip, Subcutaneous, 4 times daily    TRUEPLUS LANCETS 33 gauge Misc 1 lancet , Subcutaneous, 4 times daily     Care team members:  Edmundo - Primary OB     OBJECTIVE:   /78 (BP Location: Left arm, Patient Position: Sitting, BP Method: Small (Automatic))   Pulse 92   Ht 5' 3" (1.6 m)   Wt 75.5 kg (166 lb 8.9 oz)   LMP 01/10/2024 (Approximate)   BMI 29.50 kg/m²     Physical Exam:  Deferred    Ultrasound performed. See viewpoint for full ultrasound report.  Severino live IUP  Fetal size is appropriate for gestational age, with the EFW (2473 g) plotting at the 38% and the AC plotting at the 92%.   A limited repeat fetal anatomic survey appears normal.   The BPP score is reassuring at 8/8.  The MVP is normal.  cephalic presentation.     Significant labs/imaging:      ASSESSMENT/PLAN:     34 y.o.  female with IUP at 33w6d    Type 2 diabetes mellitus affecting pregnancy in first trimester, antepartum  Age of Dx:  (in [place] 9 years prior)  Comorbidities: None  Prepregnancy regimen:           Lantus 44u daily  Baseline HgA1c: 12.2% (24)  Last A1c:   Lab Results   Component Value Date    HGBA1C 6.2 (H) 2024     Current regimen (as of 2024):   NPH 22/32  Aspart 5/X/6 WM  FETAL ECHO - completed today and wnl  MATERNAL ECHO - still needed  Dietary counseling - done    Blood sugars per above. Fastings all above goal. PP all within goal, patient reports taking them at 1 hr (instead of 2 hrs).  We discussed the need to adjust her NPH at night. Also reviewed to adjust the timing to take before bed, not before meal.     Recommendations:  New insulin regimen.  NPH /38  Aspart 5/x/6 WM   Rx for strips sent  Continue to check BS 4x daily  Hypoglycemia precautions were once again reviewed.  F/U check in 1 week virtually.  Follow up growth in 2.5 weeks with MD visit.  Twice weekly BPP/ NST + AFV starting at 32 " weeks  (Should be ordered and arranged by the patient's primary OB provider).    Delivery timing:  Per prior recs      Of note, patient reports having contractions. Given frequency and intensity, will send to [facility] for rule out.  [facility] alerted.  Will also send reglan for N/V      Patient was counseled that prenatal ultrasound studies have limitations. They do not detect all fetal, genetic, placental, and maternal abnormalities. A normal appearing prenatal ultrasound is reassuring. However, it does not guarantee the absence of an abnormality or predict a normal outcome for the fetus or the mother.     FOLLOW UP: Per above    The patient was given an opportunity to ask questions about the management of her high risk pregnancy problems. She expressed an understanding of and agreement to the above impression and plan. All questions were answered to her satisfaction.        Robinson Joiner MD   Maternal-Fetal Medicine      Electronically Signed by Robinson Joiner September 9, 2024

## 2024-09-09 NOTE — ED PROVIDER NOTES
Encounter Date: 2024       History   No chief complaint on file.    Sri Gomez is a 34 y.o. F at 33w6d presents complaining of contractions.   This IUP is complicated by T2DM, subchorionic hemorrhage.  Patient reports contractions, denies vaginal bleeding, denies LOF.   Fetal Movement: normal    Patient has been having contractions since Saturday. She said on Saturday that were more painful and frequent, but now they are occurring 20-30 minutes apart. She was seen by Dr. Joiner in clinic today who recommended she come to the [facility] for further evaluation of contractions.   She denies fevers, chills, or dysuria.          Review of patient's allergies indicates:  No Known Allergies  Past Medical History:   Diagnosis Date    Diabetes mellitus      No past surgical history on file.  Family History   Problem Relation Name Age of Onset    Diabetes Paternal Grandmother      Hypertension Paternal Grandmother      Hypertension Father      Diabetes Father      Hypertension Mother      Diabetes Mother       Social History     Tobacco Use    Smoking status: Never    Smokeless tobacco: Never   Substance Use Topics    Alcohol use: Not Currently    Drug use: Never     Review of Systems   Constitutional:  Negative for chills and fatigue.   HENT:  Negative for congestion and sore throat.    Eyes:  Negative for visual disturbance.   Respiratory:  Negative for chest tightness and shortness of breath.    Cardiovascular:  Negative for chest pain and leg swelling.   Gastrointestinal:  Positive for abdominal pain.   Genitourinary:  Negative for dysuria, vaginal bleeding and vaginal discharge.   Neurological:  Negative for headaches.       Physical Exam     Initial Vitals   BP Pulse Resp Temp SpO2   -- -- -- -- --      MAP       --         Physical Exam    Vitals reviewed.  Constitutional: She appears well-developed and well-nourished. No distress.   HENT:   Head: Normocephalic and atraumatic.   Neck: Neck  supple.   Normal range of motion.  Cardiovascular:  Normal rate.           Pulmonary/Chest: No respiratory distress.   Abdominal: Abdomen is soft. There is no abdominal tenderness.   Gravid abdomen   Musculoskeletal:      Cervical back: Normal range of motion and neck supple.     Neurological: She is alert and oriented to person, place, and time.   Skin: Skin is warm and dry.   Psychiatric: She has a normal mood and affect. Her behavior is normal. Thought content normal.     OB LABOR EXAM:       Method: Sterile vaginal exam per MD.       Dilatation: 0.   Station: -3.   Effacement: 50%.             ED Course   Obtain Fetal nonstress test (NST)    Date/Time: 2024 2:20 PM    Performed by: Ebonie Bustos MD  Authorized by: Ebonie Bustos MD    Nonstress Test:     Variability:  6-25 BPM    Decelerations:  None    Accelerations:  15 bpm    Baseline:  150    Uterine Irritability: No      Contractions:  Not present  Biophysical Profile:     Nonstress Test Interpretation: reactive      Overall Impression:  Reassuring  Post-procedure:     Patient tolerance:  Patient tolerated the procedure well with no immediate complications    Labs Reviewed   POCT URINALYSIS, DIPSTICK OR TABLET REAGENT, AUTOMATED, WITH MICROSCOP          Imaging Results    None          Medications - No data to display  Medical Decision Making  Sri Gomez is a 34 y.o. F at 33w6d complaining of contractions.    Temp:  [98 °F (36.7 °C)] 98 °F (36.7 °C)  Pulse:  [83-92] 88  Resp:  [18] 18  SpO2:  [100 %] 100 %  BP: (110-120)/(70-78) 120/70    PE: non-tender on abdominal exam, no rebound or guarding.   SVE: cl/th/hi  NST reactive and reassuring  [place] quiet   Udip unremarkable    Patient unlikely to have labor process due to closed cervix and quiet tocometer. Return to ED precautions given for frequent painful contractions occurring 5-6min apart for one hour.  Patient stable for discharge.     Ebonie Bustos MD  Obstetrics &  Gynecology, PGY-1         Amount and/or Complexity of Data Reviewed  Labs: ordered.              Attending Attestation:   Physician Attestation Statement for Resident:  As the supervising MD   Physician Attestation Statement: I have personally seen and examined this patient.   I agree with the above history.  -:   As the supervising MD I agree with the above PE.     As the supervising MD I agree with the above treatment, course, plan, and disposition.   -: I agree with the above edited resident note. Pt seen and examined, chart and labs reviewed.    Briefly, 33 yo G1 at 33w6d presenting from [facility] clinic for r/o PTL. Afebrile, VSS. NST Cat I reactive, [place] quiet. SVE long/closed/high. Udip unremarkable, neg for dehydration or infection. Pt reassured by normal findings.     All questions answered. Stable for d/c home with outpatient follow up     Quiana Templeton MD  OB Hospitalist  2024     I was personally present during the critical portions of the procedure(s) performed by the resident and was immediately available in the ED to provide services and assistance as needed during the entire procedure.  I have reviewed and agree with the residents interpretation of the following: lab data.  I have reviewed the following: old records at this facility.                                       Clinical Impression:  Final diagnoses:  [O47.03]  uterine contractions in third trimester, antepartum (Primary)  [Z3A.33] 33 weeks gestation of pregnancy                 Ebonie Bustos MD  Resident  24       Quiana Templeton MD  24

## 2024-09-09 NOTE — ASSESSMENT & PLAN NOTE
Age of Dx: 2015 (in [place] 9 years prior)  Comorbidities: None  Prepregnancy regimen:           Lantus 44u daily  Baseline HgA1c: 12.2% (2/20/24)  Last A1c:   Lab Results   Component Value Date    HGBA1C 6.2 (H) 06/17/2024     Current regimen (as of 09/09/2024):   NPH 22/32  Aspart 5/X/6 WM  FETAL ECHO - completed today and wnl  MATERNAL ECHO - still needed  Dietary counseling - done    Blood sugars per above. Fastings all above goal. PP all within goal, patient reports taking them at 1 hr (instead of 2 hrs).  We discussed the need to adjust her NPH at night. Also reviewed to adjust the timing to take before bed, not before meal.     Recommendations:  New insulin regimen.  NPH 22/38  Aspart 5/x/6 WM   Rx for strips sent  Continue to check BS 4x daily  Hypoglycemia precautions were once again reviewed.  F/U check in 1 week virtually.  Follow up growth in 2.5 weeks with MD visit.  Twice weekly BPP/ NST + AFV starting at 32 weeks  (Should be ordered and arranged by the patient's primary OB provider).    Delivery timing:  Per prior recs      Of note, patient reports having contractions. Given frequency and intensity, will send to [facility] for rule out.  [facility] alerted.  Will also send reglan for N/V

## 2024-09-09 NOTE — TELEPHONE ENCOUNTER
Monday, September 9, 2024    Contacted translation dept and spoke with Felicita who 3-way called pt as a courtesy to bring her BS log for her in-person visit with Dr. Joiner.     RADHA Ga

## 2024-09-12 ENCOUNTER — HOSPITAL ENCOUNTER (OUTPATIENT)
Dept: OBSTETRICS AND GYNECOLOGY | Facility: HOSPITAL | Age: 34
Discharge: HOME | End: 2024-09-12
Payer: MEDICAID

## 2024-09-12 VITALS
DIASTOLIC BLOOD PRESSURE: 62 MMHG | OXYGEN SATURATION: 98 % | SYSTOLIC BLOOD PRESSURE: 102 MMHG | HEART RATE: 96 BPM | RESPIRATION RATE: 19 BRPM

## 2024-09-12 DIAGNOSIS — O24.312 PRE-EXISTING DIABETES MELLITUS DURING PREGNANCY IN SECOND TRIMESTER: ICD-10-CM

## 2024-09-12 PROCEDURE — 59025 FETAL NON-STRESS TEST: CPT

## 2024-09-16 ENCOUNTER — LAB VISIT (OUTPATIENT)
Dept: LAB | Facility: HOSPITAL | Age: 34
End: 2024-09-16
Attending: STUDENT IN AN ORGANIZED HEALTH CARE EDUCATION/TRAINING PROGRAM
Payer: MEDICAID

## 2024-09-16 ENCOUNTER — PROCEDURE VISIT (OUTPATIENT)
Dept: MATERNAL FETAL MEDICINE | Facility: CLINIC | Age: 34
End: 2024-09-16
Payer: MEDICAID

## 2024-09-16 ENCOUNTER — ROUTINE PRENATAL (OUTPATIENT)
Dept: OBSTETRICS AND GYNECOLOGY | Facility: CLINIC | Age: 34
End: 2024-09-16
Payer: MEDICAID

## 2024-09-16 VITALS
SYSTOLIC BLOOD PRESSURE: 110 MMHG | DIASTOLIC BLOOD PRESSURE: 82 MMHG | WEIGHT: 169.56 LBS | BODY MASS INDEX: 30.03 KG/M2

## 2024-09-16 DIAGNOSIS — O24.312 PRE-EXISTING DIABETES MELLITUS DURING PREGNANCY IN SECOND TRIMESTER: ICD-10-CM

## 2024-09-16 DIAGNOSIS — Z3A.34 34 WEEKS GESTATION OF PREGNANCY: Primary | ICD-10-CM

## 2024-09-16 DIAGNOSIS — R35.0 FREQUENCY OF MICTURITION: ICD-10-CM

## 2024-09-16 DIAGNOSIS — Z34.03 ENCOUNTER FOR SUPERVISION OF NORMAL FIRST PREGNANCY IN THIRD TRIMESTER: ICD-10-CM

## 2024-09-16 LAB
BASOPHILS # BLD AUTO: 0.06 K/UL (ref 0–0.2)
BASOPHILS NFR BLD: 0.7 % (ref 0–1.9)
BILIRUB SERPL-MCNC: NEGATIVE MG/DL
BLOOD URINE, POC: NEGATIVE
CLARITY, POC UA: CLEAR
COLOR, POC UA: ABNORMAL
DIFFERENTIAL METHOD BLD: ABNORMAL
EOSINOPHIL # BLD AUTO: 0.1 K/UL (ref 0–0.5)
EOSINOPHIL NFR BLD: 1.4 % (ref 0–8)
ERYTHROCYTE [DISTWIDTH] IN BLOOD BY AUTOMATED COUNT: 15.8 % (ref 11.5–14.5)
GLUCOSE UR QL STRIP: ABNORMAL
HCT VFR BLD AUTO: 31.7 % (ref 37–48.5)
HGB BLD-MCNC: 10.2 G/DL (ref 12–16)
HIV 1+2 AB+HIV1 P24 AG SERPL QL IA: NORMAL
IMM GRANULOCYTES # BLD AUTO: 0.04 K/UL (ref 0–0.04)
IMM GRANULOCYTES NFR BLD AUTO: 0.4 % (ref 0–0.5)
KETONES UR QL STRIP: ABNORMAL
LEUKOCYTE ESTERASE URINE, POC: ABNORMAL
LYMPHOCYTES # BLD AUTO: 2.9 K/UL (ref 1–4.8)
LYMPHOCYTES NFR BLD: 31.4 % (ref 18–48)
MCH RBC QN AUTO: 23.8 PG (ref 27–31)
MCHC RBC AUTO-ENTMCNC: 32.2 G/DL (ref 32–36)
MCV RBC AUTO: 74 FL (ref 82–98)
MONOCYTES # BLD AUTO: 0.6 K/UL (ref 0.3–1)
MONOCYTES NFR BLD: 6.3 % (ref 4–15)
NEUTROPHILS # BLD AUTO: 5.5 K/UL (ref 1.8–7.7)
NEUTROPHILS NFR BLD: 59.8 % (ref 38–73)
NITRITE, POC UA: NEGATIVE
NRBC BLD-RTO: 0 /100 WBC
PH, POC UA: 7
PLATELET # BLD AUTO: 372 K/UL (ref 150–450)
PMV BLD AUTO: 11.4 FL (ref 9.2–12.9)
PROTEIN, POC: ABNORMAL
RBC # BLD AUTO: 4.28 M/UL (ref 4–5.4)
SPECIFIC GRAVITY, POC UA: 1.02
TREPONEMA PALLIDUM IGG+IGM AB [PRESENCE] IN SERUM OR PLASMA BY IMMUNOASSAY: NONREACTIVE
UROBILINOGEN, POC UA: ABNORMAL
WBC # BLD AUTO: 9.15 K/UL (ref 3.9–12.7)

## 2024-09-16 PROCEDURE — 85025 COMPLETE CBC W/AUTO DIFF WBC: CPT | Performed by: STUDENT IN AN ORGANIZED HEALTH CARE EDUCATION/TRAINING PROGRAM

## 2024-09-16 PROCEDURE — 99213 OFFICE O/P EST LOW 20 MIN: CPT | Mod: TH,S$PBB,, | Performed by: STUDENT IN AN ORGANIZED HEALTH CARE EDUCATION/TRAINING PROGRAM

## 2024-09-16 PROCEDURE — 36415 COLL VENOUS BLD VENIPUNCTURE: CPT | Performed by: STUDENT IN AN ORGANIZED HEALTH CARE EDUCATION/TRAINING PROGRAM

## 2024-09-16 PROCEDURE — 87389 HIV-1 AG W/HIV-1&-2 AB AG IA: CPT | Performed by: STUDENT IN AN ORGANIZED HEALTH CARE EDUCATION/TRAINING PROGRAM

## 2024-09-16 PROCEDURE — 99212 OFFICE O/P EST SF 10 MIN: CPT | Mod: PBBFAC,TH,PO | Performed by: STUDENT IN AN ORGANIZED HEALTH CARE EDUCATION/TRAINING PROGRAM

## 2024-09-16 PROCEDURE — 87653 STREP B DNA AMP PROBE: CPT | Performed by: STUDENT IN AN ORGANIZED HEALTH CARE EDUCATION/TRAINING PROGRAM

## 2024-09-16 PROCEDURE — 87086 URINE CULTURE/COLONY COUNT: CPT | Performed by: STUDENT IN AN ORGANIZED HEALTH CARE EDUCATION/TRAINING PROGRAM

## 2024-09-16 PROCEDURE — 99999PBSHW POCT URINE DIPSTICK WITHOUT MICROSCOPE: Mod: PBBFAC,,,

## 2024-09-16 PROCEDURE — 99999 PR PBB SHADOW E&M-EST. PATIENT-LVL II: CPT | Mod: PBBFAC,,, | Performed by: STUDENT IN AN ORGANIZED HEALTH CARE EDUCATION/TRAINING PROGRAM

## 2024-09-16 PROCEDURE — 86593 SYPHILIS TEST NON-TREP QUANT: CPT | Performed by: STUDENT IN AN ORGANIZED HEALTH CARE EDUCATION/TRAINING PROGRAM

## 2024-09-16 PROCEDURE — 81002 URINALYSIS NONAUTO W/O SCOPE: CPT | Mod: PBBFAC,PO | Performed by: STUDENT IN AN ORGANIZED HEALTH CARE EDUCATION/TRAINING PROGRAM

## 2024-09-16 PROCEDURE — 76819 FETAL BIOPHYS PROFIL W/O NST: CPT | Mod: PBBFAC,PO | Performed by: STUDENT IN AN ORGANIZED HEALTH CARE EDUCATION/TRAINING PROGRAM

## 2024-09-16 RX ORDER — NITROFURANTOIN 25; 75 MG/1; MG/1
100 CAPSULE ORAL 2 TIMES DAILY
Qty: 10 CAPSULE | Refills: 0 | Status: SHIPPED | OUTPATIENT
Start: 2024-09-16 | End: 2024-09-21

## 2024-09-16 NOTE — PROGRESS NOTES
"Chief Complaint   Patient presents with    Routine Prenatal Visit       34 y.o., at 34w6d by Estimated Date of Delivery: 10/22/24    Complaints today: Patient doing "better." Continues to have some cramping but improved from previous. Does have some urinary frequency. Fetal movements are presents.     ROS  OBSTETRICS:   Contractions: Cramping   Bleeding: n   Loss of fluid: n   Fetal movement: Y  GASTRO:   Nausea: n   Vomiting: n      OB History    Para Term  AB Living   1             SAB IAB Ectopic Multiple Live Births                  # Outcome Date GA Lbr Juan/2nd Weight Sex Type Anes PTL Lv   1 Current                Dating reviewed  Allergies and problem list reviewed and updated  Medical and surgical history reviewed  Prenatal labs reviewed and updated    PHYSICAL EXAM  /82   Wt 76.9 kg (169 lb 8.5 oz)   LMP 01/10/2024 (Approximate)   BMI 30.03 kg/m²     GENERAL: No acute distress  NEURO: Alert and oriented x3  PSYCH: Normal mood and affect  PULMONARY: Non-labored respiration  ABDomen: Soft, gravid, nontender    ASSESSMENT AND PLAN     Problems (from 24 to present)       Problem Noted Resolved    Encounter for supervision of normal pregnancy in third trimester 2024 by Uvaldo Wyatt MD No    Type 2 diabetes mellitus affecting pregnancy in first trimester, antepartum 3/8/2024 by Red Cid III, MD No    Overview Signed 3/8/2024  9:52 AM by Red Cid III, MD     [facility] checklist for antepartum care of pregestational DM  First Trimester  [ ] Record onset age of DM  [ ] History of DM complications  - ketoacidosis, hypoglycemia, gastroparesis, nephropathy, retinopathy, HTN, CAD  [ ] PE of lower extremities  [ ] referral for retinal exam  [ ] Pneumococcal vaccine  [ ] Lab tests  - A1c   - CMP  - TSH  - P/Cr   - EKG if >36 yo or cardiac risk factors present    Second trimester  [ ] ASA-81 between 12 and 28 weeks  [ ] Detailed anatomy US  [ ] Fetal echo    Third trimester  [ " ] Antepartum surveillance at 32-34 weeks     Delivery planning  [ ] US for fetal growth (consider CB if EFW >4500g)  [ ] Delivery timing  - Good glycemic control: 39 0/7 to 39 6/7  - Poor glycemic control: 36 0/7 to 38 6/7  - Complications such as FGR, Pre-E: individualized                 GBS/3rds today  Insulin per MFM, recent increased PM NPH  Urine culture, Empiric treatment given for UTI     labor precautions given  Follow-up: 1 weeks

## 2024-09-17 ENCOUNTER — OFFICE VISIT (OUTPATIENT)
Dept: MATERNAL FETAL MEDICINE | Facility: CLINIC | Age: 34
End: 2024-09-17
Payer: MEDICAID

## 2024-09-17 DIAGNOSIS — O24.111 TYPE 2 DIABETES MELLITUS AFFECTING PREGNANCY IN FIRST TRIMESTER, ANTEPARTUM: Primary | ICD-10-CM

## 2024-09-17 PROCEDURE — 99213 OFFICE O/P EST LOW 20 MIN: CPT | Mod: TH,95,, | Performed by: OBSTETRICS & GYNECOLOGY

## 2024-09-17 PROCEDURE — 3044F HG A1C LEVEL LT 7.0%: CPT | Mod: CPTII,95,, | Performed by: OBSTETRICS & GYNECOLOGY

## 2024-09-17 NOTE — ASSESSMENT & PLAN NOTE
Age of Dx: 2015 (in [place] 9 years prior)  Comorbidities: None  Prepregnancy regimen:           Lantus 44u daily  Baseline HgA1c: 12.2% (2/20/24)  Last A1c:   Lab Results   Component Value Date    HGBA1C 6.2 (H) 06/17/2024     Current regimen (as of 09/17/2024):   NPH 22/38  Aspart 5/X/6 WM  FETAL ECHO - WNL  MATERNAL ECHO - still needed  Dietary counseling - done    Blood sugars per above. All within goal. No concerns and no issues with insulin tolerance.    Recommendations:  Continue current insulin regimen.  NPH 22/38  Aspart 5/x/6 WM   Continue to check BS 4x daily  Hypoglycemia precautions were once again reviewed.  F/U check in 2 weeks in person as previously scheduled.  Twice weekly BPP/ NST + AFV starting at 32 weeks  (Should be ordered and arranged by the patient's primary OB provider).    Delivery timing:  Per prior recs

## 2024-09-17 NOTE — PROGRESS NOTES
Maternal Fetal Medicine follow up consult    The patient location is: home  The chief complaint leading to consultation is: BS check    Visit type: audiovisual    Face to Face time with patient: 3m 05s    Each patient to whom he or she provides medical services by telemedicine is:  (1) informed of the relationship between the physician and patient and the respective role of any other health care provider with respect to management of the patient; and (2) notified that he or she may decline to receive medical services by telemedicine and may withdraw from such care at any time.    SUBJECTIVE:     Sri Gomez is a 34 y.o.  female with IUP at 33w6d who is seen in follow up consultation by M.  Pregnancy complications include:   Problem   Type 2 Diabetes Mellitus Affecting Pregnancy in First Trimester, Antepartum       Previous notes reviewed.   No changes to medical, surgical, family, social, or obstetric history.    Interval history since last [facility] visit:   Patient has no complaints today. She is overall feeling well.  Patient denies any contractions/cramping, vaginal bleeding or leakage of fluid.  She reports good fetal movement.    Medications:  Current Outpatient Medications   Medication Instructions    aspirin (ECOTRIN) 81 mg, Oral, Daily    blood-glucose sensor (DEXCOM G7 SENSOR) Rena 1 Device, Misc.(Non-Drug; Combo Route), Continuous    famotidine (PEPCID) 40 mg, Oral, Nightly    ferrous sulfate 325 mg, Oral, Daily    folic acid (FOLVITE) 400 mcg, Oral, Daily    insulin aspart U-100 (NOVOLOG) 100 unit/mL (3 mL) InPn pen Inject 5 Units into the skin before breakfast AND 6 Units before dinner.    insulin NPH isoph U-100 human (HUMULIN N NPH INSULIN KWIKPEN) 100 unit/mL (3 mL) InPn Inject 22 Units into the skin before breakfast AND 38 Units every evening.    metFORMIN (GLUCOPHAGE) 850 MG tablet 1 tablet with breakfast Orally Once a day    metoclopramide HCl (REGLAN) 10 mg, Oral, Every 6  "hours PRN    multivit with minerals/lutein (MULTIVITAMIN 50 PLUS ORAL) Oral    nitrofurantoin, macrocrystal-monohydrate, (MACROBID) 100 MG capsule 100 mg, Oral, 2 times daily    pen needle, diabetic 32 gauge x /32" Ndle 1 Syringe, Misc.(Non-Drug; Combo Route), 4 times daily    polyethylene glycol (GLYCOLAX) 17 g, Oral, Daily    senna (SENOKOT) 8.6 mg tablet 1 tablet, Oral, Daily    terconazole (TERAZOL 3) 0.8 % vaginal cream 1 applicator, Vaginal, Nightly    TRUE METRIX GLUCOSE METER Misc use TO test blood glucose FOUR TIMES DAILY    TRUE METRIX GLUCOSE TEST STRIP Strp 1 strip, Subcutaneous, 4 times daily    TRUEPLUS LANCETS 33 gauge Misc 1 lancet , Subcutaneous, 4 times daily     Care team members:  Edmundo - Primary OB     OBJECTIVE:   LMP 01/10/2024 (Approximate)     Physical Exam:  Deferred - virtual    Significant labs/imaging:      ASSESSMENT/PLAN:     34 y.o.  female with IUP at 33w6d    Type 2 diabetes mellitus affecting pregnancy in first trimester, antepartum  Age of Dx:  (in [place] 9 years prior)  Comorbidities: None  Prepregnancy regimen:           Lantus 44u daily  Baseline HgA1c: 12.2% (24)  Last A1c:   Lab Results   Component Value Date    HGBA1C 6.2 (H) 2024     Current regimen (as of 2024):   NPH 22/38  Aspart 5/X/6 WM  FETAL ECHO - WNL  MATERNAL ECHO - still needed  Dietary counseling - done    Blood sugars per above. All within goal. No concerns and no issues with insulin tolerance.    Recommendations:  Continue current insulin regimen.  NPH 22/38  Aspart 5/x/6 WM   Continue to check BS 4x daily  Hypoglycemia precautions were once again reviewed.  F/U check in 2 weeks in person as previously scheduled.  Twice weekly BPP/ NST + AFV starting at 32 weeks  (Should be ordered and arranged by the patient's primary OB provider).    Delivery timing:  Per prior recs      Patient was counseled that prenatal ultrasound studies have limitations. They do not detect all fetal, genetic, " placental, and maternal abnormalities. A normal appearing prenatal ultrasound is reassuring. However, it does not guarantee the absence of an abnormality or predict a normal outcome for the fetus or the mother.     FOLLOW UP: Per above    The patient was given an opportunity to ask questions about the management of her high risk pregnancy problems. She expressed an understanding of and agreement to the above impression and plan. All questions were answered to her satisfaction.        Robinson Joiner MD   Maternal-Fetal Medicine      Electronically Signed by Robinson Joiner September 17, 2024

## 2024-09-18 ENCOUNTER — HOSPITAL ENCOUNTER (OUTPATIENT)
Facility: HOSPITAL | Age: 34
Discharge: HOME | End: 2024-09-18
Attending: STUDENT IN AN ORGANIZED HEALTH CARE EDUCATION/TRAINING PROGRAM | Admitting: STUDENT IN AN ORGANIZED HEALTH CARE EDUCATION/TRAINING PROGRAM
Payer: MEDICAID

## 2024-09-18 VITALS
TEMPERATURE: 98 F | SYSTOLIC BLOOD PRESSURE: 113 MMHG | DIASTOLIC BLOOD PRESSURE: 67 MMHG | RESPIRATION RATE: 18 BRPM | HEART RATE: 94 BPM | OXYGEN SATURATION: 99 %

## 2024-09-18 DIAGNOSIS — Z3A.35 35 WEEKS GESTATION OF PREGNANCY: ICD-10-CM

## 2024-09-18 LAB
ALBUMIN SERPL BCP-MCNC: 2.4 G/DL (ref 3.5–5.2)
ALP SERPL-CCNC: 194 U/L (ref 55–135)
ALT SERPL W/O P-5'-P-CCNC: 8 U/L (ref 10–44)
ANION GAP SERPL CALC-SCNC: 7 MMOL/L (ref 8–16)
AST SERPL-CCNC: 10 U/L (ref 10–40)
BACTERIA UR CULT: NORMAL
BACTERIA UR CULT: NORMAL
BASOPHILS # BLD AUTO: 0.04 K/UL (ref 0–0.2)
BASOPHILS NFR BLD: 0.5 % (ref 0–1.9)
BILIRUB SERPL-MCNC: 0.3 MG/DL (ref 0.1–1)
BUN SERPL-MCNC: 11 MG/DL (ref 6–20)
CALCIUM SERPL-MCNC: 8.4 MG/DL (ref 8.7–10.5)
CHLORIDE SERPL-SCNC: 113 MMOL/L (ref 95–110)
CO2 SERPL-SCNC: 14 MMOL/L (ref 23–29)
CREAT SERPL-MCNC: 0.7 MG/DL (ref 0.5–1.4)
DIFFERENTIAL METHOD BLD: ABNORMAL
EOSINOPHIL # BLD AUTO: 0.1 K/UL (ref 0–0.5)
EOSINOPHIL NFR BLD: 1.3 % (ref 0–8)
ERYTHROCYTE [DISTWIDTH] IN BLOOD BY AUTOMATED COUNT: 15.7 % (ref 11.5–14.5)
EST. GFR  (NO RACE VARIABLE): >60 ML/MIN/1.73 M^2
GLUCOSE SERPL-MCNC: 199 MG/DL (ref 70–110)
HCT VFR BLD AUTO: 29.4 % (ref 37–48.5)
HGB BLD-MCNC: 9.5 G/DL (ref 12–16)
IMM GRANULOCYTES # BLD AUTO: 0.04 K/UL (ref 0–0.04)
IMM GRANULOCYTES NFR BLD AUTO: 0.5 % (ref 0–0.5)
LYMPHOCYTES # BLD AUTO: 2.3 K/UL (ref 1–4.8)
LYMPHOCYTES NFR BLD: 29.1 % (ref 18–48)
MCH RBC QN AUTO: 24.1 PG (ref 27–31)
MCHC RBC AUTO-ENTMCNC: 32.3 G/DL (ref 32–36)
MCV RBC AUTO: 75 FL (ref 82–98)
MONOCYTES # BLD AUTO: 0.5 K/UL (ref 0.3–1)
MONOCYTES NFR BLD: 6.2 % (ref 4–15)
NEUTROPHILS # BLD AUTO: 4.8 K/UL (ref 1.8–7.7)
NEUTROPHILS NFR BLD: 62.4 % (ref 38–73)
NRBC BLD-RTO: 0 /100 WBC
PLATELET # BLD AUTO: 312 K/UL (ref 150–450)
PMV BLD AUTO: 11.3 FL (ref 9.2–12.9)
POTASSIUM SERPL-SCNC: 4.2 MMOL/L (ref 3.5–5.1)
PROT SERPL-MCNC: 6.3 G/DL (ref 6–8.4)
RBC # BLD AUTO: 3.94 M/UL (ref 4–5.4)
SODIUM SERPL-SCNC: 134 MMOL/L (ref 136–145)
WBC # BLD AUTO: 7.74 K/UL (ref 3.9–12.7)

## 2024-09-18 PROCEDURE — 36415 COLL VENOUS BLD VENIPUNCTURE: CPT | Performed by: STUDENT IN AN ORGANIZED HEALTH CARE EDUCATION/TRAINING PROGRAM

## 2024-09-18 PROCEDURE — 80053 COMPREHEN METABOLIC PANEL: CPT | Performed by: STUDENT IN AN ORGANIZED HEALTH CARE EDUCATION/TRAINING PROGRAM

## 2024-09-18 PROCEDURE — 25000003 PHARM REV CODE 250: Performed by: STUDENT IN AN ORGANIZED HEALTH CARE EDUCATION/TRAINING PROGRAM

## 2024-09-18 PROCEDURE — 85025 COMPLETE CBC W/AUTO DIFF WBC: CPT | Performed by: STUDENT IN AN ORGANIZED HEALTH CARE EDUCATION/TRAINING PROGRAM

## 2024-09-18 RX ORDER — ACETAMINOPHEN 500 MG
1000 TABLET ORAL ONCE
Status: COMPLETED | OUTPATIENT
Start: 2024-09-18 | End: 2024-09-18

## 2024-09-18 RX ADMIN — ACETAMINOPHEN 1000 MG: 500 TABLET ORAL at 01:09

## 2024-09-18 NOTE — NURSING
" via wheelchair to unit  from ER. Pt complains of complaints of upper and lower abdominal pain that started last night (7/10) constant pain. Took 2 Tylenol last night without relief.  Reports worsening pain this AM. Denies vaginal bleeding or leaking of fluid. Reports good fetal movement. Complains of "green colored urine" but is currently taking antibiotics, have taken 3 doses since prescribed. Denies any form of back pain. Reports history of diabetes and reports taking insulin and checking blood glucose today as ordered. Reports eating a bagel, 20 minutes before coming to the hospital. VSS. SVE closed/thick/high.  "

## 2024-09-19 ENCOUNTER — HOSPITAL ENCOUNTER (OUTPATIENT)
Facility: HOSPITAL | Age: 34
Discharge: HOME | End: 2024-09-19
Attending: STUDENT IN AN ORGANIZED HEALTH CARE EDUCATION/TRAINING PROGRAM | Admitting: STUDENT IN AN ORGANIZED HEALTH CARE EDUCATION/TRAINING PROGRAM
Payer: MEDICAID

## 2024-09-19 VITALS — TEMPERATURE: 98 F | RESPIRATION RATE: 16 BRPM

## 2024-09-19 DIAGNOSIS — Z36.89 NST (NON-STRESS TEST) REACTIVE: ICD-10-CM

## 2024-09-19 LAB — GROUP B STREPTOCOCCUS, PCR: POSITIVE

## 2024-09-19 PROCEDURE — 59025 FETAL NON-STRESS TEST: CPT

## 2024-09-19 PROCEDURE — 99211 OFF/OP EST MAY X REQ PHY/QHP: CPT | Mod: 25

## 2024-09-19 NOTE — PLAN OF CARE
Pt arrived on unit for scheduled nst, efm applied.  Pt denies pain, vag bleeding and leaking fluid.

## 2024-09-19 NOTE — PLAN OF CARE
Fetal Assessment Report     Patient admitted to prenatal testing for NST.     Indication: insulin dependent dm    GP     GA:  35.2    EDC: 10/22  Test results: Reactive and reassuring     FHR baseline: 130    Accelerations last 15 seconds or greater with a minimum of 2 accelerations noted in 20 minutes.     Decelerations: 0    Contractions: 0    Reports: denies headache, blurred vision or seeing spots     VS:  BP-  110/65  P-  82  R-  16 O2%  100    Patient educated on normal fetal movement, signs of labor, and when to come to L &D. Return for headache blurred vision or seeing spots     Repeat testing as scheduled.

## 2024-09-23 ENCOUNTER — PROCEDURE VISIT (OUTPATIENT)
Dept: MATERNAL FETAL MEDICINE | Facility: CLINIC | Age: 34
End: 2024-09-23
Payer: MEDICAID

## 2024-09-23 ENCOUNTER — ROUTINE PRENATAL (OUTPATIENT)
Dept: OBSTETRICS AND GYNECOLOGY | Facility: CLINIC | Age: 34
End: 2024-09-23
Payer: MEDICAID

## 2024-09-23 VITALS
BODY MASS INDEX: 30.15 KG/M2 | SYSTOLIC BLOOD PRESSURE: 112 MMHG | WEIGHT: 170.19 LBS | DIASTOLIC BLOOD PRESSURE: 68 MMHG

## 2024-09-23 DIAGNOSIS — O24.111 TYPE 2 DIABETES MELLITUS AFFECTING PREGNANCY IN FIRST TRIMESTER, ANTEPARTUM: Primary | ICD-10-CM

## 2024-09-23 DIAGNOSIS — Z34.03 ENCOUNTER FOR SUPERVISION OF NORMAL FIRST PREGNANCY IN THIRD TRIMESTER: ICD-10-CM

## 2024-09-23 DIAGNOSIS — O24.312 PRE-EXISTING DIABETES MELLITUS DURING PREGNANCY IN SECOND TRIMESTER: ICD-10-CM

## 2024-09-23 PROCEDURE — 76819 FETAL BIOPHYS PROFIL W/O NST: CPT | Mod: PBBFAC,PO | Performed by: STUDENT IN AN ORGANIZED HEALTH CARE EDUCATION/TRAINING PROGRAM

## 2024-09-23 PROCEDURE — 99999 PR PBB SHADOW E&M-EST. PATIENT-LVL III: CPT | Mod: PBBFAC,,, | Performed by: STUDENT IN AN ORGANIZED HEALTH CARE EDUCATION/TRAINING PROGRAM

## 2024-09-23 PROCEDURE — 99213 OFFICE O/P EST LOW 20 MIN: CPT | Mod: PBBFAC,25,TH,PO | Performed by: STUDENT IN AN ORGANIZED HEALTH CARE EDUCATION/TRAINING PROGRAM

## 2024-09-26 ENCOUNTER — HOSPITAL ENCOUNTER (OUTPATIENT)
Dept: OBSTETRICS AND GYNECOLOGY | Facility: HOSPITAL | Age: 34
Discharge: HOME | End: 2024-09-26
Payer: MEDICAID

## 2024-09-26 VITALS
TEMPERATURE: 99 F | DIASTOLIC BLOOD PRESSURE: 63 MMHG | SYSTOLIC BLOOD PRESSURE: 107 MMHG | OXYGEN SATURATION: 99 % | RESPIRATION RATE: 20 BRPM | HEART RATE: 91 BPM

## 2024-09-26 DIAGNOSIS — O24.312 PRE-EXISTING DIABETES MELLITUS DURING PREGNANCY IN SECOND TRIMESTER: ICD-10-CM

## 2024-09-26 PROCEDURE — 59025 FETAL NON-STRESS TEST: CPT

## 2024-09-27 ENCOUNTER — PATIENT MESSAGE (OUTPATIENT)
Dept: MATERNAL FETAL MEDICINE | Facility: CLINIC | Age: 34
End: 2024-09-27

## 2024-09-27 ENCOUNTER — OFFICE VISIT (OUTPATIENT)
Dept: MATERNAL FETAL MEDICINE | Facility: CLINIC | Age: 34
End: 2024-09-27
Payer: MEDICAID

## 2024-09-27 ENCOUNTER — ANESTHESIA (OUTPATIENT)
Dept: OBSTETRICS AND GYNECOLOGY | Facility: OTHER | Age: 34
End: 2024-09-27
Payer: MEDICAID

## 2024-09-27 ENCOUNTER — PROCEDURE VISIT (OUTPATIENT)
Dept: MATERNAL FETAL MEDICINE | Facility: CLINIC | Age: 34
End: 2024-09-27
Payer: MEDICAID

## 2024-09-27 ENCOUNTER — ANESTHESIA EVENT (OUTPATIENT)
Dept: OBSTETRICS AND GYNECOLOGY | Facility: OTHER | Age: 34
End: 2024-09-27
Payer: MEDICAID

## 2024-09-27 ENCOUNTER — HOSPITAL ENCOUNTER (INPATIENT)
Facility: OTHER | Age: 34
LOS: 6 days | Discharge: HOME | End: 2024-10-03
Attending: OBSTETRICS & GYNECOLOGY | Admitting: OBSTETRICS & GYNECOLOGY
Payer: MEDICAID

## 2024-09-27 VITALS
WEIGHT: 171.94 LBS | SYSTOLIC BLOOD PRESSURE: 127 MMHG | BODY MASS INDEX: 30.46 KG/M2 | DIASTOLIC BLOOD PRESSURE: 82 MMHG

## 2024-09-27 DIAGNOSIS — Z3A.36 36 WEEKS GESTATION OF PREGNANCY: ICD-10-CM

## 2024-09-27 DIAGNOSIS — Z98.891 STATUS POST PRIMARY LOW TRANSVERSE CESAREAN SECTION: Primary | ICD-10-CM

## 2024-09-27 DIAGNOSIS — Z92.89 HISTORY OF FETAL BIOPHYSICAL PROFILE: ICD-10-CM

## 2024-09-27 DIAGNOSIS — O99.013 ANEMIA AFFECTING PREGNANCY IN THIRD TRIMESTER: ICD-10-CM

## 2024-09-27 DIAGNOSIS — O36.8390 NON-REASSURING ELECTRONIC FETAL MONITORING TRACING: ICD-10-CM

## 2024-09-27 DIAGNOSIS — O36.8130 DECREASED FETAL MOVEMENTS IN THIRD TRIMESTER: ICD-10-CM

## 2024-09-27 DIAGNOSIS — O36.8130 DECREASED FETAL MOVEMENTS IN THIRD TRIMESTER, SINGLE OR UNSPECIFIED FETUS: ICD-10-CM

## 2024-09-27 DIAGNOSIS — O24.111 TYPE 2 DIABETES MELLITUS AFFECTING PREGNANCY IN FIRST TRIMESTER, ANTEPARTUM: Primary | ICD-10-CM

## 2024-09-27 DIAGNOSIS — Z36.89 ENCOUNTER FOR ULTRASOUND TO CHECK FETAL GROWTH: ICD-10-CM

## 2024-09-27 DIAGNOSIS — O24.111 TYPE 2 DIABETES MELLITUS AFFECTING PREGNANCY IN FIRST TRIMESTER, ANTEPARTUM: ICD-10-CM

## 2024-09-27 DIAGNOSIS — Z34.93 ENCOUNTER FOR SUPERVISION OF NORMAL PREGNANCY IN THIRD TRIMESTER: ICD-10-CM

## 2024-09-27 DIAGNOSIS — O46.90 VAGINAL BLEEDING DURING PREGNANCY: ICD-10-CM

## 2024-09-27 DIAGNOSIS — O36.8131 DECREASED FETAL MOVEMENTS IN THIRD TRIMESTER, FETUS 1 OF MULTIPLE GESTATION: ICD-10-CM

## 2024-09-27 DIAGNOSIS — O13.3 GESTATIONAL HYPERTENSION, THIRD TRIMESTER: ICD-10-CM

## 2024-09-27 DIAGNOSIS — A49.1 GBS (GROUP B STREPTOCOCCUS) INFECTION: ICD-10-CM

## 2024-09-27 LAB
ABO + RH BLD: NORMAL
ALBUMIN SERPL BCP-MCNC: 2.5 G/DL (ref 3.5–5.2)
ALP SERPL-CCNC: 219 U/L (ref 55–135)
ALT SERPL W/O P-5'-P-CCNC: 10 U/L (ref 10–44)
ANION GAP SERPL CALC-SCNC: 9 MMOL/L (ref 8–16)
AST SERPL-CCNC: 13 U/L (ref 10–40)
BASOPHILS # BLD AUTO: 0.04 K/UL (ref 0–0.2)
BASOPHILS NFR BLD: 0.5 % (ref 0–1.9)
BILIRUB SERPL-MCNC: 0.3 MG/DL (ref 0.1–1)
BLD GP AB SCN CELLS X3 SERPL QL: NORMAL
BUN SERPL-MCNC: 10 MG/DL (ref 6–20)
CALCIUM SERPL-MCNC: 8.5 MG/DL (ref 8.7–10.5)
CHLORIDE SERPL-SCNC: 113 MMOL/L (ref 95–110)
CO2 SERPL-SCNC: 14 MMOL/L (ref 23–29)
CREAT SERPL-MCNC: 0.6 MG/DL (ref 0.5–1.4)
DIFFERENTIAL METHOD BLD: ABNORMAL
EOSINOPHIL # BLD AUTO: 0.2 K/UL (ref 0–0.5)
EOSINOPHIL NFR BLD: 2.6 % (ref 0–8)
ERYTHROCYTE [DISTWIDTH] IN BLOOD BY AUTOMATED COUNT: 16.7 % (ref 11.5–14.5)
EST. GFR  (NO RACE VARIABLE): >60 ML/MIN/1.73 M^2
GLUCOSE SERPL-MCNC: 111 MG/DL (ref 70–110)
HCT VFR BLD AUTO: 34 % (ref 37–48.5)
HGB BLD-MCNC: 10.9 G/DL (ref 12–16)
IMM GRANULOCYTES # BLD AUTO: 0.04 K/UL (ref 0–0.04)
IMM GRANULOCYTES NFR BLD AUTO: 0.5 % (ref 0–0.5)
LYMPHOCYTES # BLD AUTO: 2.9 K/UL (ref 1–4.8)
LYMPHOCYTES NFR BLD: 33.6 % (ref 18–48)
MCH RBC QN AUTO: 23.6 PG (ref 27–31)
MCHC RBC AUTO-ENTMCNC: 32.1 G/DL (ref 32–36)
MCV RBC AUTO: 74 FL (ref 82–98)
MONOCYTES # BLD AUTO: 0.4 K/UL (ref 0.3–1)
MONOCYTES NFR BLD: 5 % (ref 4–15)
NEUTROPHILS # BLD AUTO: 5 K/UL (ref 1.8–7.7)
NEUTROPHILS NFR BLD: 57.8 % (ref 38–73)
NRBC BLD-RTO: 0 /100 WBC
PLATELET # BLD AUTO: 291 K/UL (ref 150–450)
PMV BLD AUTO: 10.8 FL (ref 9.2–12.9)
POTASSIUM SERPL-SCNC: 4.1 MMOL/L (ref 3.5–5.1)
PROT SERPL-MCNC: 6.5 G/DL (ref 6–8.4)
RBC # BLD AUTO: 4.62 M/UL (ref 4–5.4)
SODIUM SERPL-SCNC: 136 MMOL/L (ref 136–145)
TREPONEMA PALLIDUM IGG+IGM AB [PRESENCE] IN SERUM OR PLASMA BY IMMUNOASSAY: NONREACTIVE
WBC # BLD AUTO: 8.72 K/UL (ref 3.9–12.7)

## 2024-09-27 PROCEDURE — 86900 BLOOD TYPING SEROLOGIC ABO: CPT

## 2024-09-27 PROCEDURE — 59025 FETAL NON-STRESS TEST: CPT | Mod: 26,,, | Performed by: STUDENT IN AN ORGANIZED HEALTH CARE EDUCATION/TRAINING PROGRAM

## 2024-09-27 PROCEDURE — 86901 BLOOD TYPING SEROLOGIC RH(D): CPT

## 2024-09-27 PROCEDURE — 99285 EMERGENCY DEPT VISIT HI MDM: CPT | Mod: 25,27

## 2024-09-27 PROCEDURE — 99213 OFFICE O/P EST LOW 20 MIN: CPT | Mod: PBBFAC,TH | Performed by: OBSTETRICS & GYNECOLOGY

## 2024-09-27 PROCEDURE — 80053 COMPREHEN METABOLIC PANEL: CPT

## 2024-09-27 PROCEDURE — 4A1HXCZ MONITORING OF PRODUCTS OF CONCEPTION, CARDIAC RATE, EXTERNAL APPROACH: ICD-10-PCS | Performed by: OBSTETRICS & GYNECOLOGY

## 2024-09-27 PROCEDURE — 86593 SYPHILIS TEST NON-TREP QUANT: CPT

## 2024-09-27 PROCEDURE — 85025 COMPLETE CBC W/AUTO DIFF WBC: CPT

## 2024-09-27 PROCEDURE — 99284 EMERGENCY DEPT VISIT MOD MDM: CPT | Mod: ,,, | Performed by: STUDENT IN AN ORGANIZED HEALTH CARE EDUCATION/TRAINING PROGRAM

## 2024-09-27 PROCEDURE — 99285 EMERGENCY DEPT VISIT HI MDM: CPT | Mod: ,,, | Performed by: OBSTETRICS & GYNECOLOGY

## 2024-09-27 PROCEDURE — 76815 OB US LIMITED FETUS(S): CPT | Mod: 26,,, | Performed by: OBSTETRICS & GYNECOLOGY

## 2024-09-27 PROCEDURE — 59025 FETAL NON-STRESS TEST: CPT

## 2024-09-27 PROCEDURE — 59025 FETAL NON-STRESS TEST: CPT | Mod: 26,,, | Performed by: OBSTETRICS & GYNECOLOGY

## 2024-09-27 PROCEDURE — 25000003 PHARM REV CODE 250: Performed by: OBSTETRICS & GYNECOLOGY

## 2024-09-27 PROCEDURE — 99999 PR PBB SHADOW E&M-EST. PATIENT-LVL III: CPT | Mod: PBBFAC,,, | Performed by: OBSTETRICS & GYNECOLOGY

## 2024-09-27 PROCEDURE — 11000001 HC ACUTE MED/SURG PRIVATE ROOM

## 2024-09-27 PROCEDURE — 86850 RBC ANTIBODY SCREEN: CPT

## 2024-09-27 PROCEDURE — G0378 HOSPITAL OBSERVATION PER HR: HCPCS

## 2024-09-27 RX ORDER — GLUCAGON 1 MG
1 KIT INJECTION
Status: DISCONTINUED | OUTPATIENT
Start: 2024-09-28 | End: 2024-09-29

## 2024-09-27 RX ORDER — INSULIN ASPART 100 [IU]/ML
5 INJECTION, SOLUTION INTRAVENOUS; SUBCUTANEOUS
Status: DISCONTINUED | OUTPATIENT
Start: 2024-09-28 | End: 2024-09-29

## 2024-09-27 RX ORDER — SIMETHICONE 80 MG
1 TABLET,CHEWABLE ORAL EVERY 6 HOURS PRN
Status: DISCONTINUED | OUTPATIENT
Start: 2024-09-27 | End: 2024-09-29

## 2024-09-27 RX ORDER — ACETAMINOPHEN 325 MG/1
650 TABLET ORAL EVERY 6 HOURS PRN
Status: DISCONTINUED | OUTPATIENT
Start: 2024-09-27 | End: 2024-09-29

## 2024-09-27 RX ORDER — ACETAMINOPHEN 500 MG
1000 TABLET ORAL ONCE
Status: COMPLETED | OUTPATIENT
Start: 2024-09-27 | End: 2024-09-27

## 2024-09-27 RX ORDER — FAMOTIDINE 20 MG/1
40 TABLET, FILM COATED ORAL NIGHTLY
Status: DISCONTINUED | OUTPATIENT
Start: 2024-09-27 | End: 2024-09-30

## 2024-09-27 RX ORDER — AMOXICILLIN 250 MG
1 CAPSULE ORAL NIGHTLY PRN
Status: DISCONTINUED | OUTPATIENT
Start: 2024-09-27 | End: 2024-09-29

## 2024-09-27 RX ORDER — PRENATAL WITH FERROUS FUM AND FOLIC ACID 3080; 920; 120; 400; 22; 1.84; 3; 20; 10; 1; 12; 200; 27; 25; 2 [IU]/1; [IU]/1; MG/1; [IU]/1; MG/1; MG/1; MG/1; MG/1; MG/1; MG/1; UG/1; MG/1; MG/1; MG/1; MG/1
1 TABLET ORAL DAILY
Status: DISCONTINUED | OUTPATIENT
Start: 2024-09-28 | End: 2024-09-29

## 2024-09-27 RX ORDER — INSULIN ASPART 100 [IU]/ML
6 INJECTION, SOLUTION INTRAVENOUS; SUBCUTANEOUS
Status: DISCONTINUED | OUTPATIENT
Start: 2024-09-28 | End: 2024-09-29

## 2024-09-27 RX ORDER — ASPIRIN 81 MG/1
81 TABLET ORAL DAILY
Status: DISCONTINUED | OUTPATIENT
Start: 2024-09-28 | End: 2024-09-29

## 2024-09-27 RX ORDER — DIPHENHYDRAMINE HCL 25 MG
25 CAPSULE ORAL EVERY 4 HOURS PRN
Status: DISCONTINUED | OUTPATIENT
Start: 2024-09-27 | End: 2024-09-29

## 2024-09-27 RX ORDER — LANOLIN ALCOHOL/MO/W.PET/CERES
1 CREAM (GRAM) TOPICAL DAILY
Status: DISCONTINUED | OUTPATIENT
Start: 2024-09-28 | End: 2024-09-30

## 2024-09-27 RX ORDER — DIPHENHYDRAMINE HYDROCHLORIDE 50 MG/ML
25 INJECTION INTRAMUSCULAR; INTRAVENOUS EVERY 4 HOURS PRN
Status: DISCONTINUED | OUTPATIENT
Start: 2024-09-27 | End: 2024-09-29

## 2024-09-27 RX ORDER — ONDANSETRON 8 MG/1
8 TABLET, ORALLY DISINTEGRATING ORAL EVERY 8 HOURS PRN
Status: DISCONTINUED | OUTPATIENT
Start: 2024-09-27 | End: 2024-09-29

## 2024-09-27 RX ORDER — SODIUM CHLORIDE 0.9 % (FLUSH) 0.9 %
10 SYRINGE (ML) INJECTION
Status: DISCONTINUED | OUTPATIENT
Start: 2024-09-27 | End: 2024-09-29

## 2024-09-27 RX ORDER — INSULIN ASPART 100 [IU]/ML
0-10 INJECTION, SOLUTION INTRAVENOUS; SUBCUTANEOUS EVERY 6 HOURS PRN
Status: DISCONTINUED | OUTPATIENT
Start: 2024-09-28 | End: 2024-09-29

## 2024-09-27 RX ADMIN — ACETAMINOPHEN 1000 MG: 500 TABLET ORAL at 05:09

## 2024-09-27 NOTE — Clinical Note
Transfer To (Destination): [facility] LABOR AND DELIVERY [[id number]]   Diagnosis: Fetal heart rate non-reassuring affecting management of mother [[id number]]   Future Attending Provider: SULMA MIMS [[id number]]

## 2024-09-27 NOTE — ASSESSMENT & PLAN NOTE
Patient reports decreased movement over the past day, with no movement since 5am this morning.   BPP per above, points off for gross movements.  Will send to [facility] for prolonged monitoring and determination of best course forward.  Patient in agreement.  Call placed to alert of patient coming up.

## 2024-09-27 NOTE — PROGRESS NOTES
"Maternal Fetal Medicine follow up consult    SUBJECTIVE:     Sri Gomez is a 34 y.o.  female with IUP at 36w3d who is seen in follow up consultation by MFM.  Pregnancy complications include:   Problem   Decreased Fetal Movements in Third Trimester   Type 2 Diabetes Mellitus Affecting Pregnancy in First Trimester, Antepartum     Previous notes reviewed.   No changes to medical, surgical, family, social, or obstetric history.    Interval history since last MFM visit:   Patient has no complaints today. She is overall feeling well.  Patient denies any contractions/cramping, vaginal bleeding or leakage of fluid.  Decreased fetal movement today    Medications:  Current Outpatient Medications   Medication Instructions    aspirin (ECOTRIN) 81 mg, Oral, Daily    blood-glucose sensor (DEXCOM G7 SENSOR) Rena 1 Device, Misc.(Non-Drug; Combo Route), Continuous    famotidine (PEPCID) 40 mg, Oral, Nightly    ferrous sulfate 325 mg, Oral, Daily    folic acid (FOLVITE) 400 mcg, Oral, Daily    insulin aspart U-100 (NOVOLOG) 100 unit/mL (3 mL) InPn pen Inject 5 Units into the skin before breakfast AND 6 Units before dinner.    insulin NPH isoph U-100 human (HUMULIN N NPH INSULIN KWIKPEN) 100 unit/mL (3 mL) InPn Inject 22 Units into the skin before breakfast AND 38 Units every evening.    metFORMIN (GLUCOPHAGE) 850 MG tablet     metoclopramide HCl (REGLAN) 10 mg, Oral, Every 6 hours PRN    multivit with minerals/lutein (MULTIVITAMIN 50 PLUS ORAL) Oral    pen needle, diabetic 32 gauge x 5/32" Ndle 1 Syringe, Misc.(Non-Drug; Combo Route), 4 times daily    polyethylene glycol (GLYCOLAX) 17 g, Oral, Daily    senna (SENOKOT) 8.6 mg tablet 1 tablet, Oral, Daily    terconazole (TERAZOL 3) 0.8 % vaginal cream 1 applicator, Vaginal, Nightly    TRUE METRIX GLUCOSE METER Misc use TO test blood glucose FOUR TIMES DAILY    TRUE METRIX GLUCOSE TEST STRIP Strp 1 strip, Subcutaneous, 4 times daily    TRUEPLUS LANCETS 33 " gauge Misc 1 lancet , Subcutaneous, 4 times daily     Care team members:  Edmundo - Primary OB     OBJECTIVE:   /82 (BP Location: Right arm, Patient Position: Sitting, BP Method: Medium (Automatic))   Wt 78 kg (171 lb 15.3 oz)   LMP 01/10/2024 (Approximate)   BMI 30.46 kg/m²     Physical Exam:  Deferred    Ultrasound performed. See viewpoint for full ultrasound report.  Severino live IUP  Fetal size is appropriate for gestational age, with the EFW (2929 g) plotting at the 41% and the AC plotting at the 81%.   A limited repeat fetal anatomic survey appears normal.   The BPP score is 6/8 (gross body movements).  The MVP is normal.  cephalic presentation.     Significant labs/imaging:      ASSESSMENT/PLAN:     34 y.o.  female with IUP at 36w3d    Type 2 diabetes mellitus affecting pregnancy in first trimester, antepartum  Age of Dx:  (in [place] 9 years prior)  Comorbidities: None  Prepregnancy regimen:           Lantus 44u daily  Baseline HgA1c: 12.2% (24)  Last A1c:   Lab Results   Component Value Date    HGBA1C 6.2 (H) 2024     Current regimen (as of 2024):   NPH 22/38  Aspart 5/X/6 WM  FETAL ECHO - WNL  MATERNAL ECHO - still needed  Dietary counseling - done    Blood sugars per above. All within goal. No concerns and no issues with insulin tolerance.    Recommendations:  Continue current insulin regimen. No changes  NPH 22/38  Aspart 5/x/6 WM   Continue to check BS 4x daily  Hypoglycemia precautions were once again reviewed.  Twice weekly BPP/ NST + AFV until delivery  (Should be ordered and arranged by the patient's primary OB provider).    Delivery timing:  Per prior recs      Decreased fetal movements in third trimester  Patient reports decreased movement over the past day, with no movement since 5am this morning.   BPP per above, points off for gross movements.  Will send to [facility] for prolonged monitoring and determination of best course forward.  Patient in agreement.  Call placed  to alert of patient coming up.      FOLLOW UP: No further ultrasounds or visits were scheduled.     The patient was given an opportunity to ask questions about the management of her high risk pregnancy problems. She expressed an understanding of and agreement to the above impression and plan. All questions were answered to her satisfaction.    20 minutes of total time spent on the encounter, which includes face to face time and non-face to face time preparing to see the patient (eg, review of tests), obtaining and/or reviewing separately obtained history, documenting clinical information in the electronic or other health record, independently interpreting results (not separately reported) and communicating results to the patient/family/caregiver, or care coordination (not separately reported).        Robinson Joiner MD   Maternal-Fetal Medicine      Electronically Signed by Robinson Joiner September 27, 2024

## 2024-09-27 NOTE — ASSESSMENT & PLAN NOTE
Age of Dx: 2015 (in [place] 9 years prior)  Comorbidities: None  Prepregnancy regimen:           Lantus 44u daily  Baseline HgA1c: 12.2% (2/20/24)  Last A1c:   Lab Results   Component Value Date    HGBA1C 6.2 (H) 06/17/2024     Current regimen (as of 09/27/2024):   NPH 22/38  Aspart 5/X/6 WM  FETAL ECHO - WNL  MATERNAL ECHO - still needed  Dietary counseling - done    Blood sugars per above. All within goal. No concerns and no issues with insulin tolerance.    Recommendations:  Continue current insulin regimen. No changes  NPH 22/38  Aspart 5/x/6 WM   Continue to check BS 4x daily  Hypoglycemia precautions were once again reviewed.  Twice weekly BPP/ NST + AFV until delivery  (Should be ordered and arranged by the patient's primary OB provider).    Delivery timing:  Per prior recs

## 2024-09-27 NOTE — PROGRESS NOTES
Reviewed Patient's blood sugar log as below:      Elevated Cor1wwctg: 0/10  Elevated post prandial Breakfast: 0/10  Elevated post prandial Lunch: 0/8  Elevated post prandial Dinner: 0/8    Current regimen:   º NPH 22/38  º Aspart 5/x/6 WM   New Regimen: No Changes    Roxi Clemons PGY5  Maternal Fetal Medicine Fellow

## 2024-09-27 NOTE — Clinical Note
Here is the note from our most recent visit. Decreased FM, sent to [facility] Please let me know if you have any questions. -Robinson

## 2024-09-28 LAB
GLUCOSE SERPL-MCNC: 81 MG/DL (ref 70–110)
POCT GLUCOSE: 69 MG/DL (ref 70–110)
POCT GLUCOSE: 76 MG/DL (ref 70–110)
POCT GLUCOSE: 78 MG/DL (ref 70–110)
POCT GLUCOSE: 83 MG/DL (ref 70–110)
POCT GLUCOSE: 88 MG/DL (ref 70–110)
POCT GLUCOSE: 96 MG/DL (ref 70–110)

## 2024-09-28 PROCEDURE — 63600175 PHARM REV CODE 636 W HCPCS

## 2024-09-28 PROCEDURE — 76818 FETAL BIOPHYS PROFILE W/NST: CPT | Mod: 26,,, | Performed by: OBSTETRICS & GYNECOLOGY

## 2024-09-28 PROCEDURE — G0378 HOSPITAL OBSERVATION PER HR: HCPCS

## 2024-09-28 PROCEDURE — 96372 THER/PROPH/DIAG INJ SC/IM: CPT

## 2024-09-28 PROCEDURE — 25000003 PHARM REV CODE 250

## 2024-09-28 PROCEDURE — 82947 ASSAY GLUCOSE BLOOD QUANT: CPT

## 2024-09-28 PROCEDURE — 36415 COLL VENOUS BLD VENIPUNCTURE: CPT

## 2024-09-28 PROCEDURE — 99232 SBSQ HOSP IP/OBS MODERATE 35: CPT | Mod: ,,, | Performed by: OBSTETRICS & GYNECOLOGY

## 2024-09-28 PROCEDURE — 11000001 HC ACUTE MED/SURG PRIVATE ROOM

## 2024-09-28 RX ORDER — IBUPROFEN 200 MG
16 TABLET ORAL
Status: DISCONTINUED | OUTPATIENT
Start: 2024-09-28 | End: 2024-09-29

## 2024-09-28 RX ORDER — ACETAMINOPHEN 500 MG
1000 TABLET ORAL EVERY 6 HOURS PRN
Status: DISCONTINUED | OUTPATIENT
Start: 2024-09-28 | End: 2024-09-30

## 2024-09-28 RX ORDER — SODIUM CHLORIDE, SODIUM LACTATE, POTASSIUM CHLORIDE, CALCIUM CHLORIDE 600; 310; 30; 20 MG/100ML; MG/100ML; MG/100ML; MG/100ML
INJECTION, SOLUTION INTRAVENOUS CONTINUOUS
Status: DISCONTINUED | OUTPATIENT
Start: 2024-09-28 | End: 2024-09-28

## 2024-09-28 RX ORDER — ACETAMINOPHEN 500 MG
1000 TABLET ORAL EVERY 6 HOURS PRN
Status: DISCONTINUED | OUTPATIENT
Start: 2024-09-28 | End: 2024-09-28

## 2024-09-28 RX ORDER — IBUPROFEN 200 MG
24 TABLET ORAL
Status: DISCONTINUED | OUTPATIENT
Start: 2024-09-28 | End: 2024-09-29

## 2024-09-28 RX ADMIN — HUMAN INSULIN 38 UNITS: 100 INJECTION, SUSPENSION SUBCUTANEOUS at 09:09

## 2024-09-28 RX ADMIN — SODIUM CHLORIDE, POTASSIUM CHLORIDE, SODIUM LACTATE AND CALCIUM CHLORIDE: 600; 310; 30; 20 INJECTION, SOLUTION INTRAVENOUS at 09:09

## 2024-09-28 RX ADMIN — FAMOTIDINE 40 MG: 20 TABLET ORAL at 09:09

## 2024-09-28 RX ADMIN — FERROUS SULFATE TAB 325 MG (65 MG ELEMENTAL FE) 1 EACH: 325 (65 FE) TAB at 09:09

## 2024-09-28 RX ADMIN — ACETAMINOPHEN 1000 MG: 500 TABLET, FILM COATED ORAL at 07:09

## 2024-09-28 RX ADMIN — FAMOTIDINE 40 MG: 20 TABLET ORAL at 12:09

## 2024-09-28 RX ADMIN — DEXTROSE MONOHYDRATE 3 MILLION UNITS: 50 INJECTION, SOLUTION INTRAVENOUS at 09:09

## 2024-09-28 RX ADMIN — ASPIRIN 81 MG: 81 TABLET ORAL at 09:09

## 2024-09-28 RX ADMIN — HUMAN INSULIN 22 UNITS: 100 INJECTION, SUSPENSION SUBCUTANEOUS at 06:09

## 2024-09-28 RX ADMIN — DEXTROSE MONOHYDRATE 3 MILLION UNITS: 50 INJECTION, SOLUTION INTRAVENOUS at 04:09

## 2024-09-28 RX ADMIN — DEXTROSE MONOHYDRATE 5 MILLION UNITS: 5 INJECTION INTRAVENOUS at 12:09

## 2024-09-28 RX ADMIN — PRENATAL VIT W/ FE FUMARATE-FA TAB 27-0.8 MG 1 TABLET: 27-0.8 TAB at 09:09

## 2024-09-28 RX ADMIN — ACETAMINOPHEN 1000 MG: 500 TABLET, FILM COATED ORAL at 12:09

## 2024-09-28 RX ADMIN — DEXTROSE MONOHYDRATE 3 MILLION UNITS: 50 INJECTION, SOLUTION INTRAVENOUS at 01:09

## 2024-09-28 RX ADMIN — INSULIN ASPART 6 UNITS: 100 INJECTION, SOLUTION INTRAVENOUS; SUBCUTANEOUS at 05:09

## 2024-09-28 NOTE — ED NOTES
34 y.o.  36w4d by 7wk US       **Swedish**  Dx: Cat 2 tracing, T2DM, Anemia, GBS   PPHRisk: low  SVE: 3/50/-3 @ 0015   Mem: I  US: vtx  Job: 6#  Meds: PCN, NPH , Aspart , SSI, Fe/Colace, ASA, PNV, Pepcid   BC: ASK  Labs:O POS/RI/HIVnr/Trepnr/GBBSpos       F ppB ppL ppD     [ ] OB - Wyatt  [ ] Boy- NEEDS circ consents  [x] Consents  [ ] Repeat BPP

## 2024-09-28 NOTE — PLAN OF CARE
Problem:  Fall Injury Risk  Goal: Absence of Fall, Infant Drop and Related Injury  Outcome: Progressing     Problem: Adult Inpatient Plan of Care  Goal: Plan of Care Review  Outcome: Progressing  Goal: Patient-Specific Goal (Individualized)  Outcome: Progressing  Goal: Absence of Hospital-Acquired Illness or Injury  Outcome: Progressing  Goal: Optimal Comfort and Wellbeing  Outcome: Progressing  Goal: Readiness for Transition of Care  Outcome: Progressing     Problem: Diabetes Comorbidity  Goal: Blood Glucose Level Within Targeted Range  Outcome: Progressing     Problem: Pain Acute  Goal: Optimal Pain Control and Function  Outcome: Progressing

## 2024-09-28 NOTE — PROGRESS NOTES
Maternal Fetal Medicine  Progress Note          Subjective:    Sri Gomez is a 34 y.o.  at 36w4d admitted for observation in the setting of FHT decelerations and BPP 6/8 noted on . Please see H&P for details regarding presentation at admission. This pregnancy is complicated by .    Additionally, patient did have contractions on HD#1, and SVE with cervical changed from 1/50/-3 to 3/50/-3 initially noted at 0015 on .     Interim HPI:   Patient reports irregular contractions, however unable to state frequency. She is feeling hungry despite CLD. Denies f/c, n/v. No vaginal bleeding or LOF. Normal FM. Otherwise asymptomatic this AM.    Medical, Surgical, Social, Family, and Obstetric History: reviewed in chart  Current Medications:    aspirin  81 mg Oral Daily    famotidine  40 mg Oral QHS    ferrous sulfate  1 tablet Oral Daily    insulin aspart U-100  5 Units Subcutaneous with breakfast    And    insulin aspart U-100  6 Units Subcutaneous with dinner    insulin NPH  22 Units Subcutaneous Before breakfast    And    insulin NPH  38 Units Subcutaneous QHS    pencillin G potassium IV (PEDS and ADULTS)  3 Million Units Intravenous Q4H    prenatal vitamin  1 tablet Oral Daily      Current Facility-Administered Medications:     acetaminophen, 1,000 mg, Oral, Q6H PRN    acetaminophen, 650 mg, Oral, Q6H PRN    dextrose 10%, 12.5 g, Intravenous, PRN    dextrose 10%, 25 g, Intravenous, PRN    diphenhydrAMINE, 25 mg, Oral, Q4H PRN    diphenhydrAMINE, 25 mg, Intravenous, Q4H PRN    glucagon (human recombinant), 1 mg, Intramuscular, PRN    glucose, 16 g, Oral, PRN    glucose, 24 g, Oral, PRN    insulin aspart U-100, 0-10 Units, Subcutaneous, Q6H PRN    ondansetron, 8 mg, Oral, Q8H PRN    senna-docusate 8.6-50 mg, 1 tablet, Oral, Nightly PRN    simethicone, 1 tablet, Oral, Q6H PRN    sodium chloride 0.9%, 10 mL, Intravenous, PRN   Objective:   /80   Pulse 82   Temp 98 °F (36.7 °C)   Resp  16   LMP 01/10/2024 (Approximate)   SpO2 99%   Breastfeeding No     Focused Physical Examination   General: well developed, no acute distress  Pulmonary: respiratory effort normal with no retractions  Abdomen: gravid  Cervix: tight 3cm / 50 / -3, posterior and non-laboring      Fetal Monitoring  EFM: 155 baseline/ moderate variability/+ accels/- decels  Tocometer: no ctx    Lab Results                Assessment:   34 y.o.  at 36w4d admitted for monitoring in the setting of fetal decels, BPP 6/8, and new development of tPTL  Plan:     FHT decelerations, BPP 6/8  - Consents signed on admit  - EFW (2929 g) plotting at the 41% and the AC plotting at the 81%.   - US: cephalic on admit  - Admit to L&D for observation  - monitoring has been reassuring since admit, no decels overnight  - plan to repeat BPP today    tPTL  - patient reported ctx overnight, SVE changed 1/50/-3 > 3/50/-3 as of 0015 on   - repeat exam at 0600 and 0945 unchanged, tight 3cm  - given clinically stable with no signs of active labor, will deescalate and give diet  - plan to recheck in PM to ensure no cervical change     T2DM  - NPH 22/x/38, Aspart 5/x/6  - BS check 3x post-prandial and fasting      Anemia  - Asymptomatic  - Iron/colace  - CBC on admit      GBBS +  - Will need PCN in labor        Nikki Robert MD   OB/GYN PGY-3

## 2024-09-28 NOTE — PROGRESS NOTES
Chief Complaint   Patient presents with    Routine Prenatal Visit       34 y.o., at 36w4d by Estimated Date of Delivery: 10/22/24    Complaints today: Patient doing well. No complaints at this point. No signs of labor and fetal movments are active. Endorses adequate glycemic control.     ROS  OBSTETRICS:   Contractions: n   Bleeding: n   Loss of fluid: n   Fetal movement: y  GASTRO:   Nausea: n   Vomiting: n      OB History    Para Term  AB Living   1             SAB IAB Ectopic Multiple Live Births                  # Outcome Date GA Lbr Juan/2nd Weight Sex Type Anes PTL Lv   1 Current                Dating reviewed  Allergies and problem list reviewed and updated  Medical and surgical history reviewed  Prenatal labs reviewed and updated    PHYSICAL EXAM  /68   Wt 77.2 kg (170 lb 3.1 oz)   LMP 01/10/2024 (Approximate)   BMI 30.15 kg/m²     GENERAL: No acute distress  NEURO: Alert and oriented x3  PSYCH: Normal mood and affect  PULMONARY: Non-labored respiration  ABDomen: Soft, gravid, nontender    ASSESSMENT AND PLAN     Problems (from 24 to present)       Problem Noted Diagnosed Resolved    Encounter for supervision of normal pregnancy in third trimester 2024 by Uvaldo Wyatt MD  No    Type 2 diabetes mellitus affecting pregnancy in first trimester, antepartum 3/8/2024 by Red Cid III, MD  No    Overview Signed 3/8/2024  9:52 AM by Red Cid III, MD     [facility] checklist for antepartum care of pregestational DM  First Trimester  [ ] Record onset age of DM  [ ] History of DM complications  - ketoacidosis, hypoglycemia, gastroparesis, nephropathy, retinopathy, HTN, CAD  [ ] PE of lower extremities  [ ] referral for retinal exam  [ ] Pneumococcal vaccine  [ ] Lab tests  - A1c   - CMP  - TSH  - P/Cr   - EKG if >36 yo or cardiac risk factors present    Second trimester  [ ] ASA-81 between 12 and 28 weeks  [ ] Detailed anatomy US  [ ] Fetal echo    Third trimester  [ ]  Antepartum surveillance at 32-34 weeks     Delivery planning  [ ] US for fetal growth (consider CB if EFW >4500g)  [ ] Delivery timing  - Good glycemic control: 39 0/7 to 39 6/7  - Poor glycemic control: 36 0/7 to 38 6/7  - Complications such as FGR, Pre-E: individualized                 Continue  testing  Expectant management at this time      labor precautions given  Follow-up: 1 weeks

## 2024-09-28 NOTE — PLAN OF CARE
Problem:  Fall Injury Risk  Goal: Absence of Fall, Infant Drop and Related Injury  Outcome: Progressing     Problem: Adult Inpatient Plan of Care  Goal: Plan of Care Review  Outcome: Progressing  Goal: Patient-Specific Goal (Individualized)  Outcome: Progressing  Goal: Absence of Hospital-Acquired Illness or Injury  Outcome: Progressing  Goal: Optimal Comfort and Wellbeing  Outcome: Progressing  Goal: Readiness for Transition of Care  Outcome: Progressing     Problem: Diabetes Comorbidity  Goal: Blood Glucose Level Within Targeted Range  Outcome: Progressing     Latest Reference Range & Units 24 09:45 24 12:38 24 17:07   POCT Glucose 70 - 110 mg/dL 96 69 (L) 88   (L): Data is abnormally low    MD aware of blood sugar results. Pt denies signs of hypoglycemia. Pt safety maintained.     Problem: Pain Acute  Goal: Optimal Pain Control and Function  Outcome: Progressing     Problem:  Labor  Goal: Delayed  Delivery  Reactivated

## 2024-09-28 NOTE — ANESTHESIA PREPROCEDURE EVALUATION
[facility]  Anesthesia Pre-Operative Evaluation         Patient Name: Sri Gomez  YOB: 1990  MRN: [id number]    2024      Sri Gomez is a 34 y.o. female  @ 36w4d who presents for observation after Cat 2 tracing, decreased fetal movement and BPP of 6/8. Patient notes decreased fetal movement since 5AM today. She went to clinic today, where she had a BPP and lost points for gross body movements. Decision was made to send patient up to [facility] and stay for prolonged monitoring. Intermittent late decelerations noted in [facility] and decision was made to admit for observation.      This IUP is complicated by GBS+, T2DM, and Anemia.     OB History    Para Term  AB Living   1             SAB IAB Ectopic Multiple Live Births                  # Outcome Date GA Lbr Juan/2nd Weight Sex Type Anes PTL Lv   1 Current                Review of patient's allergies indicates:  No Known Allergies    Wt Readings from Last 1 Encounters:   24 1438 78 kg (171 lb 15.3 oz)       BP Readings from Last 3 Encounters:   24 (!) 104/59   24 127/82   24 107/63       Patient Active Problem List   Diagnosis    Type 2 diabetes mellitus affecting pregnancy in first trimester, antepartum    Vaginal bleeding during pregnancy    Encounter for supervision of normal pregnancy in third trimester    Decreased fetal movements in third trimester       No past surgical history on file.    Social History     Socioeconomic History    Marital status: Single   Tobacco Use    Smoking status: Never    Smokeless tobacco: Never   Substance and Sexual Activity    Alcohol use: Not Currently    Drug use: Never    Sexual activity: Yes     Partners: Male         Chemistry        Component Value Date/Time     2024    K 4.1 2024     (H) 2024    CO2 14 (L) 2024    BUN 10 2024     "CREATININE 0.6 09/27/2024 2031     (H) 09/27/2024 2031        Component Value Date/Time    CALCIUM 8.5 (L) 09/27/2024 2031    ALKPHOS 219 (H) 09/27/2024 2031    AST 13 09/27/2024 2031    ALT 10 09/27/2024 2031    BILITOT 0.3 09/27/2024 2031            Lab Results   Component Value Date    WBC 8.72 09/27/2024    HGB 10.9 (L) 09/27/2024    HCT 34.0 (L) 09/27/2024    MCV 74 (L) 09/27/2024     09/27/2024       No results for input(s): "PT", "INR", "PROTIME", "APTT" in the last 72 hours.                                                                                                             09/28/2024  Sri Gomez is a 34 y.o., female.      Pre-op Assessment    I have reviewed the Patient Summary Reports.     I have reviewed the Nursing Notes. I have reviewed the NPO Status.   I have reviewed the Medications.     Review of Systems  Anesthesia Hx:             Denies Family Hx of Anesthesia complications.    Denies Personal Hx of Anesthesia complications.                    Hematology/Oncology:       -- Anemia:                                  Pulmonary:     Denies Asthma.                    Neurological:       Denies Seizures.                                Endocrine:  Diabetes, type 2         Obesity / BMI > 30      Physical Exam  General: Well nourished, Cooperative, Alert and Oriented    Airway:  Mallampati: II   Mouth Opening: Normal  TM Distance: Normal  Tongue: Normal  Neck ROM: Normal ROM    Dental:  Intact        Anesthesia Plan  Type of Anesthesia, risks & benefits discussed:    Anesthesia Type: Epidural, Gen ETT, MAC, Spinal, CSE  Intra-op Monitoring Plan: Standard ASA Monitors  Post Op Pain Control Plan: epidural analgesia and multimodal analgesia  Induction:  IV  Airway Plan: Video, Post-Induction  Informed Consent: Informed consent signed with the Patient and all parties understand the risks and agree with anesthesia plan.  All questions answered.   ASA Score: 2  Day of " Surgery Review of History & Physical: H&P Update referred to the surgeon/provider.    Ready For Surgery From Anesthesia Perspective.     .

## 2024-09-28 NOTE — H&P
HISTORY AND PHYSICAL  ANTEPARTUM          Subjective:       Sri Gomez is a 34 y.o.  female with IUP at 36w3d measured by 7wk ultrasound with significant hx of T2DM and anemia who is being admitted for observation after Cat 2 tracing, decreased fetal movement and BPP of 6/8. Patient notes decreased fetal movement since 5AM today. She went to clinic today, where she had a BPP and lost points for gross body movements. Decision was made to send patient up to [facility] and stay for prolonged monitoring. Intermittent late decelerations noted in [facility] and decision was made to admit for observation.     During course of [facility] stay patient did note a slight increase in fetal movements.     Patient denies contractions, denies vaginal bleeding, denies LOF.   Fetal Movement: decreased.     This IUP is complicated by GBS+, T2DM, and Anemia.    PMHx:   Past Medical History:   Diagnosis Date    Diabetes mellitus        PSHx: No past surgical history on file.    All: Review of patient's allergies indicates:  No Known Allergies    Meds:   Medications Prior to Admission   Medication Sig Dispense Refill Last Dose    aspirin (ECOTRIN) 81 MG EC tablet Take 1 tablet (81 mg total) by mouth once daily. 30 tablet 5     blood-glucose sensor (DEXCOM G7 SENSOR) Rena 1 Device by Misc.(Non-Drug; Combo Route) route continuous. 1 each 12     famotidine (PEPCID) 40 MG tablet Take 1 tablet (40 mg total) by mouth every evening. 30 tablet 5     ferrous sulfate 325 (65 FE) MG EC tablet Take 1 tablet (325 mg total) by mouth once daily. 90 tablet 0     folic acid (FOLVITE) 400 MCG tablet Take 400 mcg by mouth once daily.       insulin aspart U-100 (NOVOLOG) 100 unit/mL (3 mL) InPn pen Inject 5 Units into the skin before breakfast AND 6 Units before dinner. 9 mL 4     insulin NPH isoph U-100 human (HUMULIN N NPH INSULIN KWIKPEN) 100 unit/mL (3 mL) InPn Inject 22 Units into the skin before breakfast AND 38 Units every evening. 12 mL 2  "    metFORMIN (GLUCOPHAGE) 850 MG tablet  (Patient not taking: Reported on 2024)       metoclopramide HCl (REGLAN) 10 MG tablet Take 1 tablet (10 mg total) by mouth every 6 (six) hours as needed (nausea and vomiting). 30 tablet 1     multivit with minerals/lutein (MULTIVITAMIN 50 PLUS ORAL) Take by mouth.       pen needle, diabetic 32 gauge x 5/32" Ndle 1 Syringe by Misc.(Non-Drug; Combo Route) route 4 (four) times daily. 200 each 3     polyethylene glycol (GLYCOLAX) 17 gram PwPk Take 17 g by mouth once daily. 10 each 0     senna (SENOKOT) 8.6 mg tablet Take 1 tablet by mouth once daily. 30 tablet 1     terconazole (TERAZOL 3) 0.8 % vaginal cream Place 1 applicator vaginally every evening. 20 g 0     TRUE METRIX GLUCOSE METER Misc use TO test blood glucose FOUR TIMES DAILY       TRUE METRIX GLUCOSE TEST STRIP Strp Inject 1 strip into the skin 4 (four) times daily. 200 each 2     TRUEPLUS LANCETS 33 gauge Misc Inject 1 lancet  into the skin 4 (four) times daily. 200 each 3        SH:   Social History     Socioeconomic History    Marital status: Single   Tobacco Use    Smoking status: Never    Smokeless tobacco: Never   Substance and Sexual Activity    Alcohol use: Not Currently    Drug use: Never    Sexual activity: Yes     Partners: Male       FH:   Family History   Problem Relation Name Age of Onset    Diabetes Paternal Grandmother      Hypertension Paternal Grandmother      Hypertension Father      Diabetes Father      Hypertension Mother      Diabetes Mother         OBHx:   OB History    Para Term  AB Living   1 0 0 0 0 0   SAB IAB Ectopic Multiple Live Births   0 0 0 0 0      # Outcome Date GA Lbr Juan/2nd Weight Sex Type Anes PTL Lv   1 Current                Objective:       BP (!) 104/59   Pulse 76   Temp 97.9 °F (36.6 °C)   Resp 18 Comment: Patient complaint of difficulty of breathing. O2 sat and RR checked. MD notified.  LMP 01/10/2024 (Approximate)   SpO2 97%   Breastfeeding No "     Vitals:    09/27/24 2324 09/27/24 2326 09/27/24 2329 09/27/24 2330   BP:       BP Location:       Patient Position:       Pulse: 81 87 86 76   Resp:       Temp:       TempSrc:       SpO2: 98%  97%        General:   alert, appears stated age, and cooperative   Lungs:   No respiratory distress   Heart:   Normal rate   Abdomen:  soft, non-tender; bowel sounds normal; no masses,  no organomegaly   Extremities negative edema, negative erythema   FHT: 140/btbv/+accels/-decels Cat 1 (reassuring)                 TOCO: Quiet   Presentations: cephalic by U/s   Cervix:     Dilation: 1cm    Effacement: 50%    Station:  -3    Consistency: medium    Position: posterior         Lab Review  Blood Type O POS  GBBS: positive  Rubella: Immune  RPR: Neg  HIV: negative  HepB: negative      Assessment:       36w3d weeks gestation presents for observation for decreased fetal movement.     There are no hospital problems to display for this patient.        Plan:      Risks, benefits, alternatives and possible complications have been discussed in detail with the patient.   - Consents signed and to chart  - EFW (2929 g) plotting at the 41% and the AC plotting at the 81%.   - US: cephalic  - Twice weekly BPP/NST + AFV until delivery  - Admit to L&D for observation    T2DM  - NPH 22/x/38, Aspart 5/x/6  - BS check 3x post-prandial and fasting   - Fasting glucose tomorrow AM    Anemia  - Asymptomatic  - Iron/colace  - CBC on admit      GBBS +  - Will need PCN in labor    Caity Diaz MD  OBGYN   PGY-1

## 2024-09-28 NOTE — ED PROVIDER NOTES
Encounter Date: 2024       History     Chief Complaint   Patient presents with    prolonged monitoring     Sri Gomez is a 34 y.o.  at 36w4d who presents to the OB ED today (2024) with CC of presented from PNT clinic.     Patient notes decreased fetal movement since 5AM today. Patient notes very rare movements. She went to PNT today and had a BPP 6/8. Points lost for lack of gross movements.     She reports no vaginal bleeding, no leakage of fluid, and no contractions.     This pregnancy is complicated by T2DM, Anemia and GBS+.          Review of patient's allergies indicates:  No Known Allergies  Past Medical History:   Diagnosis Date    Diabetes mellitus      No past surgical history on file.  Family History   Problem Relation Name Age of Onset    Diabetes Paternal Grandmother      Hypertension Paternal Grandmother      Hypertension Father      Diabetes Father      Hypertension Mother      Diabetes Mother       Social History     Tobacco Use    Smoking status: Never    Smokeless tobacco: Never   Substance Use Topics    Alcohol use: Not Currently    Drug use: Never     Review of Systems   Constitutional:  Negative for fever.   HENT:  Negative for sore throat.    Respiratory:  Negative for shortness of breath.    Cardiovascular:  Negative for chest pain.   Gastrointestinal:  Negative for nausea.   Genitourinary:  Negative for dysuria.   Musculoskeletal:  Negative for back pain.   Skin:  Negative for rash.   Neurological:  Negative for weakness.   Hematological:  Does not bruise/bleed easily.       Physical Exam     Initial Vitals [24 1550]   BP Pulse Resp Temp SpO2   119/78 96 16 98.3 °F (36.8 °C) 99 %      MAP       --         Physical Exam    Vitals reviewed.  Constitutional: She appears well-developed and well-nourished.   HENT:   Head: Normocephalic and atraumatic.   Eyes: EOM are normal.   Neck:   Normal range of motion.  Cardiovascular:  Normal rate.            Pulmonary/Chest: No respiratory distress.   Abdominal: Abdomen is soft. There is no abdominal tenderness.   Musculoskeletal:         General: Normal range of motion.      Cervical back: Normal range of motion.     Neurological: She is oriented to person, place, and time.   Skin: Skin is warm and dry.   Psychiatric: She has a normal mood and affect.     OB LABOR EXAM:         Vaginal Bleeding: none present.     Dilatation: 1.   Station: -3.   Effacement: 50%.   Amniotic Fluid Color: no fluid.     Comments: Yellow vaginal discharge, nitrazine negative, ferning negative, no pooling       ED Course   Fetal non-stress test    Date/Time: 9/27/2024 5:00 PM    Performed by: Caity Diaz MD  Authorized by: Nikki Robert MD    Nonstress Test:     Variability:  6-25 BPM    Decelerations:  Late    Accelerations:  15 bpm    Baseline:  140    Contractions:  Not present  Biophysical Profile:     Nonstress Test Interpretation: reactive      Overall Impression:  Non - reassuring    US: cephalic presentation      Labs Reviewed   CBC W/ AUTO DIFFERENTIAL - Abnormal       Result Value    WBC 8.72      RBC 4.62      Hemoglobin 10.9 (*)     Hematocrit 34.0 (*)     MCV 74 (*)     MCH 23.6 (*)     MCHC 32.1      RDW 16.7 (*)     Platelets 291      MPV 10.8      Immature Granulocytes 0.5      Gran # (ANC) 5.0      Immature Grans (Abs) 0.04      Lymph # 2.9      Mono # 0.4      Eos # 0.2      Baso # 0.04      nRBC 0      Gran % 57.8      Lymph % 33.6      Mono % 5.0      Eosinophil % 2.6      Basophil % 0.5      Differential Method Automated            Imaging Results    None          Medications   oxytocin 30 units/500 mL (60 milliunits/mL) in 0.9% NaCl (non-titrating) (has no administration in time range)   sodium chloride 0.9% flush 10 mL (has no administration in time range)   oxytocin 30 units/500 mL (60 milliunits/mL) in 0.9% NaCl (non-titrating) (95 clare-units/min Intravenous New Bag 9/30/24 0603)   oxyCODONE-acetaminophen  5-325 mg per tablet 1 tablet (has no administration in time range)   oxyCODONE-acetaminophen  mg per tablet 1 tablet (has no administration in time range)   lanolin cream (has no administration in time range)   ondansetron disintegrating tablet 8 mg (has no administration in time range)   senna-docusate 8.6-50 mg per tablet 1 tablet (has no administration in time range)   docusate sodium capsule 200 mg (has no administration in time range)   simethicone chewable tablet 80 mg (has no administration in time range)   diphenhydrAMINE capsule 25 mg (has no administration in time range)   prenatal vitamin oral tablet (has no administration in time range)   magnesium hydroxide 400 mg/5 ml suspension 2,400 mg (has no administration in time range)   bisacodyL suppository 10 mg (has no administration in time range)   measles, mumps and rubella vaccine 1,000-12,500 TCID50/0.5 mL injection 0.5 mL (has no administration in time range)   Tdap (BOOSTRIX) vaccine injection 0.5 mL (has no administration in time range)   oxytocin 30 units/500 mL (60 milliunits/mL) in 0.9% NaCl IV bolus from bag (has no administration in time range)   oxytocin 30 units/500 mL (60 milliunits/mL) in 0.9% NaCl (non-titrating) (has no administration in time range)   oxytocin injection 10 Units (has no administration in time range)   miSOPROStoL tablet 800 mcg (has no administration in time range)   miSOPROStoL tablet 800 mcg (has no administration in time range)   methylergonovine injection 200 mcg (has no administration in time range)   carboprost injection 250 mcg (has no administration in time range)   tranexamic acid in NaCl,iso-os IVPB 1,000 mg (has no administration in time range)   diphenoxylate-atropine 2.5-0.025 mg per tablet 2 tablet (has no administration in time range)   ketorolac injection 30 mg (has no administration in time range)     Followed by   ibuprofen tablet 800 mg (has no administration in time range)   insulin glargine U-100  (Lantus) pen 44 Units (has no administration in time range)   glucose chewable tablet 16 g (has no administration in time range)   glucose chewable tablet 24 g (has no administration in time range)   glucagon (human recombinant) injection 1 mg (has no administration in time range)   insulin aspart U-100 pen 0-5 Units (has no administration in time range)   dextrose 10% bolus 125 mL 125 mL (has no administration in time range)   dextrose 10% bolus 250 mL 250 mL (has no administration in time range)   acetaminophen tablet 1,000 mg (1,000 mg Oral Given 24 1755)   penicillin G potassium 5 Million Units in D5W 100 mL IVPB (MB+) (0 Million Units Intravenous Stopped 24 0119)   penicillin G potassium 5 Million Units in D5W 100 mL IVPB (MB+) (0 Million Units Intravenous Stopped 24 1532)   fentanyl 2mcg/mL with BUPivacaine 0.1% in sdoium chloride 0.9% Epidural 2 mcg/mL- 0.1 % Soln (  Override pull for Anesthesia 24 0235)   sodium citrate-citric acid 500-334 mg/5 ml solution 30 mL (30 mLs Oral Given 24 1513)   famotidine (PF) injection 20 mg (20 mg Intravenous Given 24 1501)   azithromycin (ZITHROMAX) 500 mg injection (500 mg  Given 24 1523)     Medical Decision Making  Sri Gomez is a 34 y.o.  at 36w4d who presents to the OB ED today (2024) from PNT clinic for prolonged monitoring .      Temp:  [97.7 °F (36.5 °C)-98.3 °F (36.8 °C)] 97.7 °F (36.5 °C)  Pulse:  [62-97] 70  Resp:  [16-18] 16  SpO2:  [96 %-100 %] 97 %  BP: (102-130)/(57-87) 119/81        DFM  - Since this 5AM today   - Patient noting mild increase in movements in [facility]    Cat 2 tracing   - Sent in for prolonged monitoring after BPP 6/8, points lost for lack of gross movement  - Cat 2 tracing observed with spontaneous late deceleration  - Admit to L&D for observation   - Consents signed  - US: Cephalic  - Continue close monitoring    Caity Diaz MD  OBGYN   PGY-1      Amount and/or Complexity  of Data Reviewed  Labs: ordered.    Risk  OTC drugs.              Attending Attestation:   Physician Attestation Statement for Resident:  As the supervising MD   Physician Attestation Statement: I have personally seen and examined this patient.   I agree with the above history.  -:   As the supervising MD I agree with the above PE.     As the supervising MD I agree with the above treatment, course, plan, and disposition.   I was personally present during the critical portions of the procedure(s) performed by the resident and was immediately available in the ED to provide services and assistance as needed during the entire procedure.  I have reviewed and agree with the residents interpretation of the following: lab data.  I have reviewed the following: old records at this facility.            Attending ED Notes:   I saw and examined the patient myself along with the resident. I have personally reviewed pertinent prior records, labs, imaging, and procedures. I have reviewed the documentation and agree with the findings and plan as documented.      at 36w4d presenting for prolonged monitoring after 6/8 BPP in MFM clinic. Overall reactive, however, with spontaneous late deceleration and subjective decrease in fetal movement. Also reporting contractions and leakage of fluid, no evidence of ROM or labor at this time, Cvx /-3. Will admit for prolonged monitoring.     Gretta Calero MD FACOG  OB Hospitalist                               Clinical Impression:  Final diagnoses:  [Z3A.36] 36 weeks gestation of pregnancy  [O36.8131] Decreased fetal movements in third trimester, fetus 1 of multiple gestation  [O36.8390] Non-reassuring electronic fetal monitoring tracing (Primary)          ED Disposition Condition    Send to L&D Stable                Caity Diaz MD  Resident  [phone]       Gretta Calero MD  [phone]

## 2024-09-29 PROBLEM — A49.1 GBS (GROUP B STREPTOCOCCUS) INFECTION: Status: ACTIVE | Noted: 2024-09-29

## 2024-09-29 PROBLEM — O99.013 ANEMIA AFFECTING PREGNANCY IN THIRD TRIMESTER: Status: ACTIVE | Noted: 2024-09-29

## 2024-09-29 LAB
POCT GLUCOSE: 116 MG/DL (ref 70–110)
POCT GLUCOSE: 121 MG/DL (ref 70–110)
POCT GLUCOSE: 137 MG/DL (ref 70–110)
POCT GLUCOSE: 68 MG/DL (ref 70–110)
POCT GLUCOSE: 73 MG/DL (ref 70–110)
POCT GLUCOSE: 74 MG/DL (ref 70–110)
POCT GLUCOSE: 80 MG/DL (ref 70–110)
POCT GLUCOSE: 84 MG/DL (ref 70–110)
POCT GLUCOSE: 90 MG/DL (ref 70–110)

## 2024-09-29 PROCEDURE — 11000001 HC ACUTE MED/SURG PRIVATE ROOM

## 2024-09-29 PROCEDURE — 63600175 PHARM REV CODE 636 W HCPCS

## 2024-09-29 PROCEDURE — 25000003 PHARM REV CODE 250

## 2024-09-29 PROCEDURE — 99232 SBSQ HOSP IP/OBS MODERATE 35: CPT | Mod: 25,,, | Performed by: OBSTETRICS & GYNECOLOGY

## 2024-09-29 PROCEDURE — 96372 THER/PROPH/DIAG INJ SC/IM: CPT

## 2024-09-29 PROCEDURE — 76818 FETAL BIOPHYS PROFILE W/NST: CPT | Mod: 26,,, | Performed by: OBSTETRICS & GYNECOLOGY

## 2024-09-29 RX ORDER — LIDOCAINE HYDROCHLORIDE 10 MG/ML
10 INJECTION, SOLUTION INFILTRATION; PERINEURAL ONCE AS NEEDED
Status: DISCONTINUED | OUTPATIENT
Start: 2024-09-29 | End: 2024-09-30

## 2024-09-29 RX ORDER — GLUCAGON 1 MG
1 KIT INJECTION
Status: DISCONTINUED | OUTPATIENT
Start: 2024-09-29 | End: 2024-09-30

## 2024-09-29 RX ORDER — TRANEXAMIC ACID 10 MG/ML
1000 INJECTION, SOLUTION INTRAVENOUS EVERY 30 MIN PRN
Status: DISCONTINUED | OUTPATIENT
Start: 2024-09-29 | End: 2024-09-30

## 2024-09-29 RX ORDER — CALCIUM CARBONATE 200(500)MG
500 TABLET,CHEWABLE ORAL 3 TIMES DAILY PRN
Status: DISCONTINUED | OUTPATIENT
Start: 2024-09-29 | End: 2024-09-30

## 2024-09-29 RX ORDER — OXYTOCIN-SODIUM CHLORIDE 0.9% IV SOLN 30 UNIT/500ML 30-0.9/5 UT/ML-%
95 SOLUTION INTRAVENOUS ONCE AS NEEDED
Status: DISCONTINUED | OUTPATIENT
Start: 2024-09-29 | End: 2024-09-30

## 2024-09-29 RX ORDER — MISOPROSTOL 200 UG/1
800 TABLET ORAL ONCE AS NEEDED
Status: DISCONTINUED | OUTPATIENT
Start: 2024-09-29 | End: 2024-09-30

## 2024-09-29 RX ORDER — METHYLERGONOVINE MALEATE 0.2 MG/ML
200 INJECTION INTRAVENOUS ONCE AS NEEDED
Status: DISCONTINUED | OUTPATIENT
Start: 2024-09-29 | End: 2024-09-30

## 2024-09-29 RX ORDER — SODIUM CHLORIDE, SODIUM LACTATE, POTASSIUM CHLORIDE, CALCIUM CHLORIDE 600; 310; 30; 20 MG/100ML; MG/100ML; MG/100ML; MG/100ML
INJECTION, SOLUTION INTRAVENOUS CONTINUOUS
Status: DISCONTINUED | OUTPATIENT
Start: 2024-09-29 | End: 2024-09-30

## 2024-09-29 RX ORDER — OXYTOCIN-SODIUM CHLORIDE 0.9% IV SOLN 30 UNIT/500ML 30-0.9/5 UT/ML-%
0-32 SOLUTION INTRAVENOUS CONTINUOUS
Status: DISCONTINUED | OUTPATIENT
Start: 2024-09-29 | End: 2024-09-30

## 2024-09-29 RX ORDER — SIMETHICONE 80 MG
1 TABLET,CHEWABLE ORAL 4 TIMES DAILY PRN
Status: DISCONTINUED | OUTPATIENT
Start: 2024-09-29 | End: 2024-09-30

## 2024-09-29 RX ORDER — ONDANSETRON 8 MG/1
8 TABLET, ORALLY DISINTEGRATING ORAL EVERY 8 HOURS PRN
Status: DISCONTINUED | OUTPATIENT
Start: 2024-09-29 | End: 2024-09-30

## 2024-09-29 RX ORDER — OXYTOCIN-SODIUM CHLORIDE 0.9% IV SOLN 30 UNIT/500ML 30-0.9/5 UT/ML-%
10 SOLUTION INTRAVENOUS ONCE AS NEEDED
Status: DISCONTINUED | OUTPATIENT
Start: 2024-09-29 | End: 2024-09-30

## 2024-09-29 RX ORDER — CARBOPROST TROMETHAMINE 250 UG/ML
250 INJECTION, SOLUTION INTRAMUSCULAR
Status: DISCONTINUED | OUTPATIENT
Start: 2024-09-29 | End: 2024-09-30

## 2024-09-29 RX ORDER — OXYTOCIN 10 [USP'U]/ML
10 INJECTION, SOLUTION INTRAMUSCULAR; INTRAVENOUS ONCE AS NEEDED
Status: DISCONTINUED | OUTPATIENT
Start: 2024-09-29 | End: 2024-09-30

## 2024-09-29 RX ORDER — INSULIN ASPART 100 [IU]/ML
0-10 INJECTION, SOLUTION INTRAVENOUS; SUBCUTANEOUS EVERY 6 HOURS PRN
Status: DISCONTINUED | OUTPATIENT
Start: 2024-09-29 | End: 2024-09-30

## 2024-09-29 RX ORDER — SODIUM CHLORIDE 9 MG/ML
INJECTION, SOLUTION INTRAVENOUS
Status: DISCONTINUED | OUTPATIENT
Start: 2024-09-29 | End: 2024-09-30

## 2024-09-29 RX ORDER — DIPHENOXYLATE HYDROCHLORIDE AND ATROPINE SULFATE 2.5; .025 MG/1; MG/1
2 TABLET ORAL EVERY 6 HOURS PRN
Status: DISCONTINUED | OUTPATIENT
Start: 2024-09-29 | End: 2024-09-30

## 2024-09-29 RX ADMIN — PRENATAL VIT W/ FE FUMARATE-FA TAB 27-0.8 MG 1 TABLET: 27-0.8 TAB at 08:09

## 2024-09-29 RX ADMIN — INSULIN ASPART 5 UNITS: 100 INJECTION, SOLUTION INTRAVENOUS; SUBCUTANEOUS at 08:09

## 2024-09-29 RX ADMIN — FAMOTIDINE 40 MG: 20 TABLET ORAL at 08:09

## 2024-09-29 RX ADMIN — Medication 2 MILLI-UNITS/MIN: at 02:09

## 2024-09-29 RX ADMIN — FERROUS SULFATE TAB 325 MG (65 MG ELEMENTAL FE) 1 EACH: 325 (65 FE) TAB at 08:09

## 2024-09-29 RX ADMIN — DEXTROSE MONOHYDRATE 5 MILLION UNITS: 5 INJECTION INTRAVENOUS at 03:09

## 2024-09-29 RX ADMIN — DEXTROSE MONOHYDRATE 3 MILLION UNITS: 50 INJECTION, SOLUTION INTRAVENOUS at 11:09

## 2024-09-29 RX ADMIN — ACETAMINOPHEN 1000 MG: 500 TABLET, FILM COATED ORAL at 05:09

## 2024-09-29 RX ADMIN — ASPIRIN 81 MG: 81 TABLET ORAL at 08:09

## 2024-09-29 RX ADMIN — DEXTROSE MONOHYDRATE 3 MILLION UNITS: 50 INJECTION, SOLUTION INTRAVENOUS at 07:09

## 2024-09-29 RX ADMIN — HUMAN INSULIN 22 UNITS: 100 INJECTION, SUSPENSION SUBCUTANEOUS at 05:09

## 2024-09-29 RX ADMIN — SODIUM CHLORIDE, POTASSIUM CHLORIDE, SODIUM LACTATE AND CALCIUM CHLORIDE: 600; 310; 30; 20 INJECTION, SOLUTION INTRAVENOUS at 03:09

## 2024-09-29 NOTE — ED NOTES
34 y.o.  36w4d by 7wk US    *Slovenian*  Dx: tPTL, FHT decels, BPP , T2DM, Anemia, GBS+   PPHRisk: low  SVE: 3/50/-3 @ [id number]   Mem: I  US: vtx  Job: 6  Meds: PCN, NPH 22/x/38, Aspart 5/x/6, SSI, Fe/Colace, ASA, PNV, Pepcid   BC: ASK  Labs:O POS/RI/HIVnr/Trepnr/GBBSpos       F ppB ppL ppD       78        96          Diet: Reg  Mon: NST BID  [id number] [ ] OB - [facility]  [ ] Boy- NEEDS circ consents [o] BG F, postprandial [x] repeat BPP  on

## 2024-09-29 NOTE — PROGRESS NOTES
Maternal Fetal Medicine  Progress Note          Subjective:    Sri Gomez is a 34 y.o.  admitted at 36w3d for observation in the setting of FHT decelerations and BPP 6/8 noted on . Please see H&P for details regarding presentation at admission. This pregnancy is complicated by T2DM, Anemia, GBS+ .    Additionally, patient did have contractions on HD#1, and SVE with cervical changed from 1/50/-3 to 3/50/-3 initially noted at 0015 on . Patient was monitored closely and remained unchanged on cervical exam at 1800 . Continuous monitoring since admission has been reactive and reassuring. Repeat BPP was 8/8 on . Overall clinically stable.    Interim HPI:   Today, patient is 36w5d. NAEON, resting comfortably in bed. Reporting she has not felt fetal movement overnight. Denies pelvic cramping/contractions. Reports back pain. Denies n/v, f/c, VB, LOF.     Medical, Surgical, Social, Family, and Obstetric History: reviewed in chart  Current Medications:    aspirin  81 mg Oral Daily    famotidine  40 mg Oral QHS    ferrous sulfate  1 tablet Oral Daily    insulin aspart U-100  5 Units Subcutaneous with breakfast    And    insulin aspart U-100  6 Units Subcutaneous with dinner    insulin NPH  22 Units Subcutaneous Before breakfast    And    insulin NPH  38 Units Subcutaneous QHS    pencillin G potassium IV (PEDS and ADULTS)  3 Million Units Intravenous Q4H    prenatal vitamin  1 tablet Oral Daily      Current Facility-Administered Medications:     acetaminophen, 1,000 mg, Oral, Q6H PRN    acetaminophen, 650 mg, Oral, Q6H PRN    dextrose 10%, 12.5 g, Intravenous, PRN    dextrose 10%, 25 g, Intravenous, PRN    diphenhydrAMINE, 25 mg, Oral, Q4H PRN    diphenhydrAMINE, 25 mg, Intravenous, Q4H PRN    glucagon (human recombinant), 1 mg, Intramuscular, PRN    glucose, 16 g, Oral, PRN    glucose, 24 g, Oral, PRN    insulin aspart U-100, 0-10 Units, Subcutaneous, Q6H PRN    ondansetron, 8 mg,  Oral, Q8H PRN    senna-docusate 8.6-50 mg, 1 tablet, Oral, Nightly PRN    simethicone, 1 tablet, Oral, Q6H PRN    sodium chloride 0.9%, 10 mL, Intravenous, PRN   Objective:   /65   Pulse 81   Temp 97.8 °F (36.6 °C) (Oral)   Resp 16   LMP 01/10/2024 (Approximate)   SpO2 100%   Breastfeeding No     Focused Physical Examination   General: well developed, no acute distress  Pulmonary: respiratory effort normal with no retractions  Abdomen: gravid  Cervix: deferred this AM as exam has been stable and patient is not endorsing contractions    Fetal Monitoring  EFM: 130 baseline/ moderate variability/+ accels/- decels  Tocometer: no ctx    Lab Results                Assessment:   34 y.o.  at 36w5d admitted for monitoring in the setting of fetal decels, BPP 6/8, and new development of tPTL  Plan:     FHT decelerations, BPP 6/8 ()  - monitoring has been reassuring since admit; current NST R&R  - BPP repeated on : 8  - overall reassuring fetal status    tPTL  - SVE initially changed 150/-3 > 3/50/-3 as of 0015 on   - repeat SVE at 0600, 0945, and 1800 on  unchanged, tight 3cm  - clinically stable with no signs of active labor  - no contractions on toco     T2DM  - NPH 22/x/38, Aspart 5/x/6  - BS check 3x post-prandial and fasting      Anemia  - Asymptomatic  - Iron/colace  - CBC on admit      GBBS +  - Will need PCN intrapartum      Nikki Robert MD   OB/GYN PGY-3

## 2024-09-29 NOTE — PLAN OF CARE
Problem:  Fall Injury Risk  Goal: Absence of Fall, Infant Drop and Related Injury  Outcome: Progressing     Problem: Adult Inpatient Plan of Care  Goal: Plan of Care Review  Outcome: Progressing  Goal: Patient-Specific Goal (Individualized)  Outcome: Progressing  Goal: Absence of Hospital-Acquired Illness or Injury  Outcome: Progressing  Goal: Optimal Comfort and Wellbeing  Outcome: Progressing  Goal: Readiness for Transition of Care  Outcome: Progressing     Problem: Diabetes Comorbidity  Goal: Blood Glucose Level Within Targeted Range  Outcome: Progressing     Problem: Pain Acute  Goal: Optimal Pain Control and Function  Outcome: Progressing     Problem:  Labor  Goal: Delayed  Delivery  Outcome: Progressing       Blood sugar for 2hrs post dinner, bedtime and fasting are as follows:     Latest Reference Range & Units 24 20:06 24 22:39 24 05:50   POCT Glucose 70 - 110 mg/dL 76 90 74     No signs of hypoglycemia.  At 0425, patient complains of decrease fetal movement. MD notified. Hooked to fetal monitor for a 30 minute strip. 135 FHR +accels -decels, no ctx.  At 0500, patient complains of 6/10 back ache and abdominal pain. Heat packs applied and 1g of tylenol given.  At 0600, pain score is 4/10. Patient wants to wait for the tylenol to take full effect. For reassessment after an hour.

## 2024-09-30 PROBLEM — O36.8130 DECREASED FETAL MOVEMENTS IN THIRD TRIMESTER: Status: RESOLVED | Noted: 2024-09-27 | Resolved: 2024-09-30

## 2024-09-30 PROBLEM — A49.1 GBS (GROUP B STREPTOCOCCUS) INFECTION: Status: RESOLVED | Noted: 2024-09-29 | Resolved: 2024-09-30

## 2024-09-30 PROBLEM — Z98.891 STATUS POST PRIMARY LOW TRANSVERSE CESAREAN SECTION: Status: ACTIVE | Noted: 2024-09-30

## 2024-09-30 LAB
ALLENS TEST: ABNORMAL
HCO3 UR-SCNC: 20.3 MMOL/L (ref 17–27)
PCO2 BLDA: 57 MMHG (ref 32–66)
PH SMN: 7.16 [PH] (ref 7.18–7.38)
PO2 BLDA: 18 MMHG (ref 6–31)
POC BE: -8 MMOL/L
POC SATURATED O2: 17 %
POC TCO2: 22 MMOL/L (ref 23–27)
POCT GLUCOSE: 102 MG/DL (ref 70–110)
POCT GLUCOSE: 117 MG/DL (ref 70–110)
POCT GLUCOSE: 218 MG/DL (ref 70–110)
POCT GLUCOSE: 228 MG/DL (ref 70–110)
POCT GLUCOSE: 68 MG/DL (ref 70–110)
POCT GLUCOSE: 74 MG/DL (ref 70–110)
POCT GLUCOSE: 76 MG/DL (ref 70–110)
POCT GLUCOSE: 77 MG/DL (ref 70–110)
POCT GLUCOSE: 80 MG/DL (ref 70–110)
POCT GLUCOSE: 82 MG/DL (ref 70–110)
POCT GLUCOSE: 92 MG/DL (ref 70–110)
SAMPLE: ABNORMAL
SITE: ABNORMAL

## 2024-09-30 PROCEDURE — 36004724 HC OB OR TIME LEV III - 1ST 15 MIN: Performed by: OBSTETRICS & GYNECOLOGY

## 2024-09-30 PROCEDURE — 63600175 PHARM REV CODE 636 W HCPCS

## 2024-09-30 PROCEDURE — 27200710 HC EPIDURAL INFUSION PUMP SET: Performed by: ANESTHESIOLOGY

## 2024-09-30 PROCEDURE — 59409 OBSTETRICAL CARE: CPT | Mod: ,,, | Performed by: ANESTHESIOLOGY

## 2024-09-30 PROCEDURE — 25000003 PHARM REV CODE 250

## 2024-09-30 PROCEDURE — 82803 BLOOD GASES ANY COMBINATION: CPT

## 2024-09-30 PROCEDURE — 59514 CESAREAN DELIVERY ONLY: CPT | Mod: AT,,, | Performed by: OBSTETRICS & GYNECOLOGY

## 2024-09-30 PROCEDURE — 71000039 HC RECOVERY, EACH ADD'L HOUR: Performed by: OBSTETRICS & GYNECOLOGY

## 2024-09-30 PROCEDURE — 01968 ANES/ANALG CS DLVR NEURAXIAL: CPT | Mod: ,,, | Performed by: ANESTHESIOLOGY

## 2024-09-30 PROCEDURE — 27000181 HC CABLE, IUPC

## 2024-09-30 PROCEDURE — 11000001 HC ACUTE MED/SURG PRIVATE ROOM

## 2024-09-30 PROCEDURE — 36004725 HC OB OR TIME LEV III - EA ADD 15 MIN: Performed by: OBSTETRICS & GYNECOLOGY

## 2024-09-30 PROCEDURE — C1751 CATH, INF, PER/CENT/MIDLINE: HCPCS | Performed by: ANESTHESIOLOGY

## 2024-09-30 PROCEDURE — 71000033 HC RECOVERY, INTIAL HOUR: Performed by: OBSTETRICS & GYNECOLOGY

## 2024-09-30 PROCEDURE — 3E033VJ INTRODUCTION OF OTHER HORMONE INTO PERIPHERAL VEIN, PERCUTANEOUS APPROACH: ICD-10-PCS | Performed by: OBSTETRICS & GYNECOLOGY

## 2024-09-30 PROCEDURE — 10907ZC DRAINAGE OF AMNIOTIC FLUID, THERAPEUTIC FROM PRODUCTS OF CONCEPTION, VIA NATURAL OR ARTIFICIAL OPENING: ICD-10-PCS | Performed by: OBSTETRICS & GYNECOLOGY

## 2024-09-30 PROCEDURE — 36416 COLLJ CAPILLARY BLOOD SPEC: CPT

## 2024-09-30 PROCEDURE — 37000009 HC ANESTHESIA EA ADD 15 MINS: Performed by: OBSTETRICS & GYNECOLOGY

## 2024-09-30 PROCEDURE — 37000008 HC ANESTHESIA 1ST 15 MINUTES: Performed by: OBSTETRICS & GYNECOLOGY

## 2024-09-30 PROCEDURE — 62326 NJX INTERLAMINAR LMBR/SAC: CPT

## 2024-09-30 PROCEDURE — 51702 INSERT TEMP BLADDER CATH: CPT

## 2024-09-30 PROCEDURE — 99900035 HC TECH TIME PER 15 MIN (STAT)

## 2024-09-30 RX ORDER — OXYTOCIN 10 [USP'U]/ML
INJECTION, SOLUTION INTRAMUSCULAR; INTRAVENOUS
Status: DISCONTINUED | OUTPATIENT
Start: 2024-09-30 | End: 2024-09-30

## 2024-09-30 RX ORDER — METHYLERGONOVINE MALEATE 0.2 MG/ML
200 INJECTION INTRAVENOUS ONCE AS NEEDED
Status: DISCONTINUED | OUTPATIENT
Start: 2024-09-30 | End: 2024-10-03 | Stop reason: HOSPADM

## 2024-09-30 RX ORDER — PRENATAL WITH FERROUS FUM AND FOLIC ACID 3080; 920; 120; 400; 22; 1.84; 3; 20; 10; 1; 12; 200; 27; 25; 2 [IU]/1; [IU]/1; MG/1; [IU]/1; MG/1; MG/1; MG/1; MG/1; MG/1; MG/1; UG/1; MG/1; MG/1; MG/1; MG/1
1 TABLET ORAL DAILY
Status: DISCONTINUED | OUTPATIENT
Start: 2024-10-01 | End: 2024-10-03 | Stop reason: HOSPADM

## 2024-09-30 RX ORDER — PROCHLORPERAZINE EDISYLATE 5 MG/ML
2.5 INJECTION INTRAMUSCULAR; INTRAVENOUS EVERY 6 HOURS PRN
Status: DISCONTINUED | OUTPATIENT
Start: 2024-09-30 | End: 2024-09-30

## 2024-09-30 RX ORDER — OXYCODONE HYDROCHLORIDE 10 MG/1
10 TABLET ORAL EVERY 4 HOURS PRN
Status: DISCONTINUED | OUTPATIENT
Start: 2024-09-30 | End: 2024-09-30

## 2024-09-30 RX ORDER — AZITHROMYCIN 100 MG/ML
INJECTION, POWDER, LYOPHILIZED, FOR SOLUTION INTRAVENOUS
Status: COMPLETED
Start: 2024-09-30 | End: 2024-09-30

## 2024-09-30 RX ORDER — PROCHLORPERAZINE EDISYLATE 5 MG/ML
5 INJECTION INTRAMUSCULAR; INTRAVENOUS EVERY 6 HOURS PRN
Status: DISCONTINUED | OUTPATIENT
Start: 2024-09-30 | End: 2024-09-30

## 2024-09-30 RX ORDER — TRANEXAMIC ACID 10 MG/ML
1000 INJECTION, SOLUTION INTRAVENOUS EVERY 30 MIN PRN
Status: DISCONTINUED | OUTPATIENT
Start: 2024-09-30 | End: 2024-10-03 | Stop reason: HOSPADM

## 2024-09-30 RX ORDER — DIPHENHYDRAMINE HCL 25 MG
25 CAPSULE ORAL EVERY 4 HOURS PRN
Status: DISCONTINUED | OUTPATIENT
Start: 2024-09-30 | End: 2024-10-03 | Stop reason: HOSPADM

## 2024-09-30 RX ORDER — KETOROLAC TROMETHAMINE 30 MG/ML
30 INJECTION, SOLUTION INTRAMUSCULAR; INTRAVENOUS EVERY 6 HOURS
Status: COMPLETED | OUTPATIENT
Start: 2024-10-01 | End: 2024-10-01

## 2024-09-30 RX ORDER — IBUPROFEN 400 MG/1
800 TABLET ORAL EVERY 8 HOURS
Status: DISCONTINUED | OUTPATIENT
Start: 2024-10-02 | End: 2024-10-03 | Stop reason: HOSPADM

## 2024-09-30 RX ORDER — OXYTOCIN 10 [USP'U]/ML
10 INJECTION, SOLUTION INTRAMUSCULAR; INTRAVENOUS ONCE AS NEEDED
Status: DISCONTINUED | OUTPATIENT
Start: 2024-09-30 | End: 2024-10-03 | Stop reason: HOSPADM

## 2024-09-30 RX ORDER — BISACODYL 10 MG/1
10 SUPPOSITORY RECTAL ONCE AS NEEDED
Status: DISCONTINUED | OUTPATIENT
Start: 2024-09-30 | End: 2024-10-03 | Stop reason: HOSPADM

## 2024-09-30 RX ORDER — MISOPROSTOL 200 UG/1
800 TABLET ORAL ONCE AS NEEDED
Status: DISCONTINUED | OUTPATIENT
Start: 2024-09-30 | End: 2024-10-03 | Stop reason: HOSPADM

## 2024-09-30 RX ORDER — INSULIN GLARGINE 100 [IU]/ML
44 INJECTION, SOLUTION SUBCUTANEOUS DAILY
Status: DISCONTINUED | OUTPATIENT
Start: 2024-10-01 | End: 2024-10-01

## 2024-09-30 RX ORDER — KETOROLAC TROMETHAMINE 30 MG/ML
30 INJECTION, SOLUTION INTRAMUSCULAR; INTRAVENOUS EVERY 6 HOURS
Status: DISCONTINUED | OUTPATIENT
Start: 2024-09-30 | End: 2024-09-30

## 2024-09-30 RX ORDER — ONDANSETRON HYDROCHLORIDE 2 MG/ML
INJECTION, SOLUTION INTRAMUSCULAR; INTRAVENOUS
Status: DISCONTINUED | OUTPATIENT
Start: 2024-09-30 | End: 2024-09-30

## 2024-09-30 RX ORDER — OXYCODONE AND ACETAMINOPHEN 10; 325 MG/1; MG/1
1 TABLET ORAL EVERY 4 HOURS PRN
Status: DISCONTINUED | OUTPATIENT
Start: 2024-09-30 | End: 2024-10-03 | Stop reason: HOSPADM

## 2024-09-30 RX ORDER — IBUPROFEN 200 MG
24 TABLET ORAL
Status: DISCONTINUED | OUTPATIENT
Start: 2024-09-30 | End: 2024-10-03 | Stop reason: HOSPADM

## 2024-09-30 RX ORDER — FENTANYL/BUPIVACAINE/NS/PF 2MCG/ML-.1
PLASTIC BAG, INJECTION (ML) INJECTION
Status: COMPLETED
Start: 2024-09-30 | End: 2024-09-30

## 2024-09-30 RX ORDER — OXYTOCIN-SODIUM CHLORIDE 0.9% IV SOLN 30 UNIT/500ML 30-0.9/5 UT/ML-%
95 SOLUTION INTRAVENOUS ONCE AS NEEDED
Status: DISCONTINUED | OUTPATIENT
Start: 2024-09-30 | End: 2024-10-03 | Stop reason: HOSPADM

## 2024-09-30 RX ORDER — SODIUM CHLORIDE, SODIUM LACTATE, POTASSIUM CHLORIDE, CALCIUM CHLORIDE 600; 310; 30; 20 MG/100ML; MG/100ML; MG/100ML; MG/100ML
INJECTION, SOLUTION INTRAVENOUS CONTINUOUS PRN
Status: DISCONTINUED | OUTPATIENT
Start: 2024-09-30 | End: 2024-09-30

## 2024-09-30 RX ORDER — OXYTOCIN-SODIUM CHLORIDE 0.9% IV SOLN 30 UNIT/500ML 30-0.9/5 UT/ML-%
10 SOLUTION INTRAVENOUS ONCE AS NEEDED
Status: DISCONTINUED | OUTPATIENT
Start: 2024-09-30 | End: 2024-09-30

## 2024-09-30 RX ORDER — OXYTOCIN-SODIUM CHLORIDE 0.9% IV SOLN 30 UNIT/500ML 30-0.9/5 UT/ML-%
95 SOLUTION INTRAVENOUS CONTINUOUS PRN
Status: DISCONTINUED | OUTPATIENT
Start: 2024-09-30 | End: 2024-10-01

## 2024-09-30 RX ORDER — ADHESIVE BANDAGE
30 BANDAGE TOPICAL 2 TIMES DAILY PRN
Status: DISCONTINUED | OUTPATIENT
Start: 2024-10-01 | End: 2024-10-03 | Stop reason: HOSPADM

## 2024-09-30 RX ORDER — LIDOCAINE HCL/EPINEPHRINE/PF 2%-1:200K
VIAL (ML) INJECTION
Status: DISCONTINUED | OUTPATIENT
Start: 2024-09-30 | End: 2024-09-30

## 2024-09-30 RX ORDER — FENTANYL/BUPIVACAINE/NS/PF 2MCG/ML-.1
PLASTIC BAG, INJECTION (ML) INJECTION
Status: DISCONTINUED | OUTPATIENT
Start: 2024-09-30 | End: 2024-09-30

## 2024-09-30 RX ORDER — OXYCODONE AND ACETAMINOPHEN 5; 325 MG/1; MG/1
1 TABLET ORAL EVERY 4 HOURS PRN
Status: DISCONTINUED | OUTPATIENT
Start: 2024-09-30 | End: 2024-10-03 | Stop reason: HOSPADM

## 2024-09-30 RX ORDER — AMOXICILLIN 250 MG
1 CAPSULE ORAL NIGHTLY PRN
Status: DISCONTINUED | OUTPATIENT
Start: 2024-09-30 | End: 2024-10-03 | Stop reason: HOSPADM

## 2024-09-30 RX ORDER — NALBUPHINE HYDROCHLORIDE 10 MG/ML
2.5 INJECTION, SOLUTION INTRAMUSCULAR; INTRAVENOUS; SUBCUTANEOUS ONCE AS NEEDED
Status: DISCONTINUED | OUTPATIENT
Start: 2024-09-30 | End: 2024-09-30

## 2024-09-30 RX ORDER — OXYCODONE HYDROCHLORIDE 5 MG/1
5 TABLET ORAL EVERY 4 HOURS PRN
Status: DISCONTINUED | OUTPATIENT
Start: 2024-09-30 | End: 2024-09-30

## 2024-09-30 RX ORDER — OXYTOCIN-SODIUM CHLORIDE 0.9% IV SOLN 30 UNIT/500ML 30-0.9/5 UT/ML-%
95 SOLUTION INTRAVENOUS CONTINUOUS PRN
Status: DISCONTINUED | OUTPATIENT
Start: 2024-09-30 | End: 2024-10-03 | Stop reason: HOSPADM

## 2024-09-30 RX ORDER — CARBOPROST TROMETHAMINE 250 UG/ML
250 INJECTION, SOLUTION INTRAMUSCULAR
Status: DISCONTINUED | OUTPATIENT
Start: 2024-09-30 | End: 2024-10-03 | Stop reason: HOSPADM

## 2024-09-30 RX ORDER — ACETAMINOPHEN 325 MG/1
650 TABLET ORAL EVERY 6 HOURS
Status: DISCONTINUED | OUTPATIENT
Start: 2024-09-30 | End: 2024-09-30

## 2024-09-30 RX ORDER — FENTANYL CITRATE 50 UG/ML
INJECTION, SOLUTION INTRAMUSCULAR; INTRAVENOUS
Status: DISCONTINUED | OUTPATIENT
Start: 2024-09-30 | End: 2024-09-30

## 2024-09-30 RX ORDER — IBUPROFEN 200 MG
16 TABLET ORAL
Status: DISCONTINUED | OUTPATIENT
Start: 2024-09-30 | End: 2024-10-03 | Stop reason: HOSPADM

## 2024-09-30 RX ORDER — INSULIN ASPART 100 [IU]/ML
0-5 INJECTION, SOLUTION INTRAVENOUS; SUBCUTANEOUS
Status: DISCONTINUED | OUTPATIENT
Start: 2024-09-30 | End: 2024-10-03 | Stop reason: HOSPADM

## 2024-09-30 RX ORDER — GLUCAGON 1 MG
1 KIT INJECTION
Status: DISCONTINUED | OUTPATIENT
Start: 2024-09-30 | End: 2024-10-03 | Stop reason: HOSPADM

## 2024-09-30 RX ORDER — PHENYLEPHRINE HYDROCHLORIDE 10 MG/ML
INJECTION INTRAVENOUS
Status: DISCONTINUED | OUTPATIENT
Start: 2024-09-30 | End: 2024-09-30

## 2024-09-30 RX ORDER — SODIUM CHLORIDE 0.9 % (FLUSH) 0.9 %
10 SYRINGE (ML) INJECTION
Status: DISCONTINUED | OUTPATIENT
Start: 2024-09-30 | End: 2024-10-03 | Stop reason: HOSPADM

## 2024-09-30 RX ORDER — ONDANSETRON HYDROCHLORIDE 2 MG/ML
4 INJECTION, SOLUTION INTRAVENOUS EVERY 6 HOURS PRN
Status: DISCONTINUED | OUTPATIENT
Start: 2024-09-30 | End: 2024-09-30

## 2024-09-30 RX ORDER — FENTANYL/BUPIVACAINE/NS/PF 2MCG/ML-.1
PLASTIC BAG, INJECTION (ML) INJECTION CONTINUOUS
Status: DISCONTINUED | OUTPATIENT
Start: 2024-09-30 | End: 2024-09-30

## 2024-09-30 RX ORDER — DOCUSATE SODIUM 100 MG/1
200 CAPSULE, LIQUID FILLED ORAL 2 TIMES DAILY
Status: DISCONTINUED | OUTPATIENT
Start: 2024-09-30 | End: 2024-10-03 | Stop reason: HOSPADM

## 2024-09-30 RX ORDER — KETOROLAC TROMETHAMINE 30 MG/ML
INJECTION, SOLUTION INTRAMUSCULAR; INTRAVENOUS
Status: DISCONTINUED | OUTPATIENT
Start: 2024-09-30 | End: 2024-09-30

## 2024-09-30 RX ORDER — DIPHENOXYLATE HYDROCHLORIDE AND ATROPINE SULFATE 2.5; .025 MG/1; MG/1
2 TABLET ORAL EVERY 6 HOURS PRN
Status: DISCONTINUED | OUTPATIENT
Start: 2024-09-30 | End: 2024-10-03 | Stop reason: HOSPADM

## 2024-09-30 RX ORDER — MORPHINE SULFATE 0.5 MG/ML
INJECTION, SOLUTION EPIDURAL; INTRATHECAL; INTRAVENOUS
Status: DISCONTINUED | OUTPATIENT
Start: 2024-09-30 | End: 2024-09-30

## 2024-09-30 RX ORDER — SIMETHICONE 80 MG
1 TABLET,CHEWABLE ORAL EVERY 6 HOURS PRN
Status: DISCONTINUED | OUTPATIENT
Start: 2024-09-30 | End: 2024-10-03 | Stop reason: HOSPADM

## 2024-09-30 RX ORDER — SODIUM CITRATE AND CITRIC ACID MONOHYDRATE 334; 500 MG/5ML; MG/5ML
30 SOLUTION ORAL
Status: COMPLETED | OUTPATIENT
Start: 2024-09-30 | End: 2024-09-30

## 2024-09-30 RX ORDER — DEXAMETHASONE SODIUM PHOSPHATE 4 MG/ML
INJECTION, SOLUTION INTRA-ARTICULAR; INTRALESIONAL; INTRAMUSCULAR; INTRAVENOUS; SOFT TISSUE
Status: DISCONTINUED | OUTPATIENT
Start: 2024-09-30 | End: 2024-09-30

## 2024-09-30 RX ORDER — LIDOCAINE HYDROCHLORIDE AND EPINEPHRINE 15; 5 MG/ML; UG/ML
INJECTION, SOLUTION EPIDURAL
Status: DISCONTINUED | OUTPATIENT
Start: 2024-09-30 | End: 2024-09-30

## 2024-09-30 RX ORDER — OXYTOCIN-SODIUM CHLORIDE 0.9% IV SOLN 30 UNIT/500ML 30-0.9/5 UT/ML-%
30 SOLUTION INTRAVENOUS ONCE AS NEEDED
Status: DISCONTINUED | OUTPATIENT
Start: 2024-09-30 | End: 2024-10-03 | Stop reason: HOSPADM

## 2024-09-30 RX ORDER — ONDANSETRON 8 MG/1
8 TABLET, ORALLY DISINTEGRATING ORAL EVERY 8 HOURS PRN
Status: DISCONTINUED | OUTPATIENT
Start: 2024-09-30 | End: 2024-10-03 | Stop reason: HOSPADM

## 2024-09-30 RX ORDER — FAMOTIDINE 10 MG/ML
20 INJECTION INTRAVENOUS
Status: COMPLETED | OUTPATIENT
Start: 2024-09-30 | End: 2024-09-30

## 2024-09-30 RX ADMIN — SODIUM CHLORIDE, POTASSIUM CHLORIDE, SODIUM LACTATE AND CALCIUM CHLORIDE: 600; 310; 30; 20 INJECTION, SOLUTION INTRAVENOUS at 11:09

## 2024-09-30 RX ADMIN — LIDOCAINE HYDROCHLORIDE,EPINEPHRINE BITARTRATE 3 ML: 15; .005 INJECTION, SOLUTION EPIDURAL; INFILTRATION; INTRACAUDAL; PERINEURAL at 01:09

## 2024-09-30 RX ADMIN — FAMOTIDINE 20 MG: 10 INJECTION, SOLUTION INTRAVENOUS at 03:09

## 2024-09-30 RX ADMIN — ONDANSETRON 4 MG: 2 INJECTION INTRAMUSCULAR; INTRAVENOUS at 03:09

## 2024-09-30 RX ADMIN — DEXTROSE MONOHYDRATE 3 MILLION UNITS: 50 INJECTION, SOLUTION INTRAVENOUS at 03:09

## 2024-09-30 RX ADMIN — DEXTROSE MONOHYDRATE 3 MILLION UNITS: 50 INJECTION, SOLUTION INTRAVENOUS at 12:09

## 2024-09-30 RX ADMIN — PHENYLEPHRINE HYDROCHLORIDE 200 MCG: 10 INJECTION INTRAVENOUS at 03:09

## 2024-09-30 RX ADMIN — DOCUSATE SODIUM 200 MG: 100 CAPSULE, LIQUID FILLED ORAL at 11:09

## 2024-09-30 RX ADMIN — OXYTOCIN 5 UNITS: 10 INJECTION, SOLUTION INTRAMUSCULAR; INTRAVENOUS at 04:09

## 2024-09-30 RX ADMIN — AZITHROMYCIN MONOHYDRATE 500 MG: 500 INJECTION, POWDER, LYOPHILIZED, FOR SOLUTION INTRAVENOUS at 03:09

## 2024-09-30 RX ADMIN — LIDOCAINE HYDROCHLORIDE,EPINEPHRINE BITARTRATE 5 ML: 20; .005 INJECTION, SOLUTION EPIDURAL; INFILTRATION; INTRACAUDAL; PERINEURAL at 03:09

## 2024-09-30 RX ADMIN — SODIUM CHLORIDE, SODIUM LACTATE, POTASSIUM CHLORIDE, AND CALCIUM CHLORIDE: 600; 310; 30; 20 INJECTION, SOLUTION INTRAVENOUS at 04:09

## 2024-09-30 RX ADMIN — FENTANYL CITRATE 100 MCG: 0.05 INJECTION, SOLUTION INTRAMUSCULAR; INTRAVENOUS at 03:09

## 2024-09-30 RX ADMIN — PHENYLEPHRINE HYDROCHLORIDE 100 MCG: 10 INJECTION INTRAVENOUS at 03:09

## 2024-09-30 RX ADMIN — SODIUM CITRATE AND CITRIC ACID MONOHYDRATE 30 ML: 500; 334 SOLUTION ORAL at 03:09

## 2024-09-30 RX ADMIN — PHENYLEPHRINE HYDROCHLORIDE 100 MCG: 10 INJECTION INTRAVENOUS at 04:09

## 2024-09-30 RX ADMIN — MORPHINE SULFATE 1 MG: 0.5 INJECTION, SOLUTION EPIDURAL; INTRATHECAL; INTRAVENOUS at 04:09

## 2024-09-30 RX ADMIN — ACETAMINOPHEN 1000 MG: 500 TABLET, FILM COATED ORAL at 03:09

## 2024-09-30 RX ADMIN — PROCHLORPERAZINE EDISYLATE 2.5 MG: 5 INJECTION INTRAMUSCULAR; INTRAVENOUS at 01:09

## 2024-09-30 RX ADMIN — Medication 95 MILLI-UNITS/MIN: at 05:09

## 2024-09-30 RX ADMIN — FENTANYL CITRATE 3 ML: 50 INJECTION INTRAMUSCULAR; INTRAVENOUS at 01:09

## 2024-09-30 RX ADMIN — FERROUS SULFATE TAB 325 MG (65 MG ELEMENTAL FE) 1 EACH: 325 (65 FE) TAB at 10:09

## 2024-09-30 RX ADMIN — DEXTROSE 2 G: 50 INJECTION, SOLUTION INTRAVENOUS at 03:09

## 2024-09-30 RX ADMIN — ONDANSETRON 8 MG: 8 TABLET, ORALLY DISINTEGRATING ORAL at 01:09

## 2024-09-30 RX ADMIN — KETOROLAC TROMETHAMINE 30 MG: 30 INJECTION, SOLUTION INTRAMUSCULAR; INTRAVENOUS at 04:09

## 2024-09-30 RX ADMIN — DEXTROSE MONOHYDRATE 3 MILLION UNITS: 50 INJECTION, SOLUTION INTRAVENOUS at 08:09

## 2024-09-30 RX ADMIN — DEXAMETHASONE SODIUM PHOSPHATE 4 MG: 4 INJECTION INTRA-ARTICULAR; INTRALESIONAL; INTRAMUSCULAR; INTRAVENOUS; SOFT TISSUE at 03:09

## 2024-09-30 RX ADMIN — FENTANYL CITRATE 10 ML/HR: 50 INJECTION INTRAMUSCULAR; INTRAVENOUS at 01:09

## 2024-09-30 RX ADMIN — SODIUM CHLORIDE, SODIUM LACTATE, POTASSIUM CHLORIDE, AND CALCIUM CHLORIDE: 600; 310; 30; 20 INJECTION, SOLUTION INTRAVENOUS at 02:09

## 2024-09-30 NOTE — L&D DELIVERY NOTE
[facility] - Labor & Delivery   Section   Operative Note    SUMMARY      Section Procedure Note    Procedure:   1. Primary Low Transverse   Section via Pfannensteil skin incision    Indications:   1. failed induction    Pre-operative Diagnosis:   1. IUP at 36 week 6 day pregnancy, non reassuring fetal status on  testing (BPP )  2. gHTN  3. T2DM  4. Anemia    Post-operative Diagnosis:   same    Surgeon: Kenyetta Paniagua MD     Assistants: Abi Newell MD - PGY 4 and Ebonie Bustos MD - PGY1    Anesthesia: Epidural anesthesia    Findings:    1. Single viable  male infant, with APGARS 7/9, weight 3250g.   2. Normal appearing uterus, ovaries, and fallopian tubes.  3. Normal placenta    Estimated Blood Loss: 545cc           Total IV Fluids: see anesthesia note     UOP: see anesthesia note    Specimens: none    PreOp CBC:   Lab Results   Component Value Date    WBC 8.72 2024    HGB 10.9 (L) 2024    HCT 34.0 (L) 2024    MCV 74 (L) 2024     2024                     Complications:  None; patient tolerated the procedure well.           Disposition: PACU - hemodynamically stable.           Condition: stable    Procedure Details   The patient was seen in the labor room to discuss failed induction of labor. The risks, benefits, complications, treatment options, and expected outcomes were discussed with the patient.  The patient concurred with the proposed plan, giving informed consent.  The patient was taken to Operating Room, identified as Sri Gomez and the procedure verified as Primary Low Transverse  Delivery. A Time Out was held and the above information confirmed. A  was present in the operating room.     After induction of anesthesia, the patient was prepped and draped in the usual sterile manner while placed in a dorsal supine position with a left lateral tilt.  A muñoz catheter was also placed per  nursing. Preoperative antibiotics Ancef 2 g and azithromycin 500 mg were administered. An allis test was performed to confirm adequate anesthesia.  A Pfannenstiel skin incision was made and carried down through the subcutaneous tissue to the fascia. Fascial incision was made and extended transversely. The fascia was grasped with [facility] clamps and  from the underlying rectus tissue superiorly and inferiorly. The peritoneum was identified, found to be free of adherent bowel, and entered bluntly. The peritoneal incision was extended longitudinally. The vesico-uterine peritoneum was identified, and bladder blade was inserted. The vesico-uterine peritoneum was incised transversely and the bladder flap was bluntly freed from the lower uterine segment. The bladder blade was reinserted to keep the bladder out of the operative field. A low transverse uterine incision was made with knife and extended with bluntly. The amniotic sac was already ruptured upon entry to the hysterotomy and the infant was noted to be in cephalic presentation. The head was brought to the incision. A vacuum was applied with 1 pull and 1 pop off. The fetal head was delivered without the vacuum applied, and elevated out of the pelvis. The patient delivered a single viable male infant without difficulty.  Infant weighed 3250 grams with Apgar scores of 7/9 at one and five minutes respectively. After the umbilical cord was clamped and cut, cord blood was obtained for evaluation. The placenta was removed intact, appeared normal, and was discarded. The uterus was exteriorized. The uterine outline, tubes and ovaries appeared normal. Extra pitocin was given for uterine bogginess with significant improvement. The uterine incision was closed with running locked sutures of 1 chromic. Excellent hemostasis was observed.  The uterus was then returned to the abdominal cavity. Incision was reinspected and good hemostasis was noted. The abdominal cavity was  "irrigated to remove clots. The muscle was reapproximated with 2-0 vicryl and 1-0 chromic respectively. The fascia was then reapproximated with running sutures of 1 PDS. The subcutaneous fat was reapproximated with vicryl, and skin was reapproximated with 4-0 monocryl in a subcuticular fashion.    Instrument, sponge, and needle counts were correct prior the abdominal closure and at the conclusion of the case.     The patient tolerated procedure well and was taken to the recovery room in stable condition after the procedure.    **NOTE: This patient IS a candidate for trial of labor after  delivery**    Ebonie Bustos MD  Obstetrics & Gynecology, PGY-1           Specimens:   Specimen (24h ago, onward)      None            Condition: Good    VTE Risk Mitigation (From admission, onward)           Ordered     IP VTE HIGH RISK PATIENT  Once         24 1700     Place sequential compression device  Until discontinued         24 1700     Place sequential compression device  Until discontinued         24 1347                    Disposition: PACU - hemodynamically stable.    Attestation: Good         Delivery Information for Jordon Gomez    Birth information:  YOB: 2024   Time of birth: 4:05 PM   Sex: male   Head Delivery Date/Time: 2024  4:05 PM   Delivery type: , Low Transverse   Gestational Age: 36w6d       Delivery Providers    Delivering clinician: Kenyetta Paniagua MD   Provider Role    Abi Timmons MD Resident    Ebonie Bustos MD Resident    Shayna Parekh ST Scrub Person    Debbie Marquez RN Circulator    Angela Lee MD Anesthesiologist              Measurements    Weight: 3250 g  Weight (lbs): 7 lb 2.6 oz  Length: 49.5 cm  Length (in): 19.5"  Head circumference: 34.3 cm  Chest circumference: 33 cm         Apgars    Living status: Living  Apgar Component Scores:  1 min.:  5 min.:  10 min.:  15 min.:  20 min.:    Skin color:  0  1     "   Heart rate:  2  2       Reflex irritability:  2  2       Muscle tone:  2  2       Respiratory effort:  1  2       Total:  7  9       Apgars assigned by: CLARKE MORALES RN         Operative Delivery    Forceps attempted?: No  Vacuum extractor attempted?: No         Shoulder Dystocia    Shoulder dystocia present?: No                 Interventions/Resuscitation    Method: Bulb Suctioning, Tactile Stimulation, Deep Suctioning, CPAP       Cord    Vessels: 3 vessels  Complications: Nuchal  Delayed Cord Clamping?: No  Cord Clamped Date/Time: 2024  4:05 PM  Cord Blood Disposition: Sent with Baby  Gases Sent?: Yes  Stem Cell Collection (by MD): No       Placenta    Placenta delivery date/time: 2024 1607  Placenta removal: Spontaneous  Placenta appearance: Intact  Placenta disposition: Discarded           Labor Events:       labor: Yes     Labor Onset Date/Time:         Dilation Complete Date/Time:         Start Pushing Date/Time:         Start Pushing Date/Time:       Rupture Date/Time: 24 0308        Rupture type: ARM (Artificial Rupture)        Fluid Amount:       Fluid Color: Clear              steroids:       Antibiotics given for GBS: Yes     Induction: oxytocin     Indications for induction:        Augmentation: amniotomy     Indications for augmentation: Ineffective Contraction Pattern     Labor complications: Failure to Progress in First Stage     Additional complications:          Cervical ripening:                     Delivery:      Episiotomy:       Indication for Episiotomy:       Perineal Lacerations:   Repaired:      Periurethral Laceration:   Repaired:     Labial Laceration:   Repaired:     Sulcus Laceration:   Repaired:     Vaginal Laceration:   Repaired:     Cervical Laceration:   Repaired:     Repair suture:       Repair # of packets:       Last Value - EBL - Nursing (mL):       Sum - EBL - Nursing (mL): 0     Last Value - EBL - Anesthesia (mL):      Calculated QBL (mL):  545     Running total QBL (mL): 545     Vaginal Sweep Performed:       Surgicount Correct:       Vaginal Packing:   Quantity:       Other providers:       Anesthesia    Method: Epidural          Details (if applicable):  Trial of Labor Yes    Categorization: Primary    Priority: Urgent   Indications for : Failed induction   Incision Type: low transverse     Additional  information:  Forceps:    Vacuum:    Breech:    Observed anomalies    Other (Comments):         I was present and scrubbed for the entire procedure and agree with above resident documentation.     Kenyetta Paniagua

## 2024-09-30 NOTE — ANESTHESIA PROCEDURE NOTES
Epidural    Patient location during procedure: OB   Reason for block: primary anesthetic   Reason for block: labor analgesia requested by patient and obstetrician  Diagnosis: IUP   Start time: 9/30/2024 12:45 AM  Timeout: 9/30/2024 12:40 AM  End time: 9/30/2024 1:00 AM  Surgery related to: vaginal delivery    Staffing  Performing Provider: Michael Torres MD  Authorizing Provider: Freedom Robertson Jr., MD    Staffing  Performed by: Michael Torres MD  Authorized by: Freedom Robertson Jr., MD        Preanesthetic Checklist  Completed: patient identified, IV checked, site marked, risks and benefits discussed, surgical consent, monitors and equipment checked, pre-op evaluation, timeout performed, anesthesia consent given, hand hygiene performed and patient being monitored  Preparation  Patient position: sitting  Prep: ChloraPrep  Patient monitoring: ECG, Pulse Ox and Blood Pressure  Reason for block: primary anesthetic   Epidural  Skin Anesthetic: lidocaine 1%  Skin Wheal: 3 mL  Administration type: continuous  Approach: midline  Interspace: L3-4    Injection technique: NIMO air  Needle and Epidural Catheter  Needle type: Tuohy   Needle gauge: 17  Needle length: 3.5 inches  Needle insertion depth: 8 cm  Catheter type: springwound  Catheter size: 19 G  Catheter at skin depth: 12 cm  Insertion Attempts: 1  Test dose: 3 mL of lidocaine 1.5% with Epi 1-to-200,000  Additional Documentation: no paresthesia on injection, incremental injection, no significant pain on injection, negative aspiration for heme and CSF, no signs/symptoms of IV or SA injection and no significant complaints from patient  Needle localization: anatomical landmarks  Medications:  Volume per aspiration: 5 mL  Time between aspirations: 5 minutes   Assessment  Ease of block: easy  Patient's tolerance of the procedure: comfortable throughout block and no complaints No inadvertent dural puncture with Tuohy.  Dural puncture not performed with spinal  needle

## 2024-09-30 NOTE — PLAN OF CARE
Problem:  Fall Injury Risk  Goal: Absence of Fall, Infant Drop and Related Injury  Outcome: Progressing     Problem: Adult Inpatient Plan of Care  Goal: Plan of Care Review  Outcome: Progressing  Goal: Patient-Specific Goal (Individualized)  Outcome: Progressing  Goal: Absence of Hospital-Acquired Illness or Injury  Outcome: Progressing  Goal: Optimal Comfort and Wellbeing  Outcome: Progressing  Goal: Readiness for Transition of Care  Outcome: Progressing     Problem: Diabetes Comorbidity  Goal: Blood Glucose Level Within Targeted Range  Outcome: Progressing     Problem: Pain Acute  Goal: Optimal Pain Control and Function  Outcome: Progressing     Problem:  Labor  Goal: Delayed  Delivery  Outcome: Progressing     Problem: Infection  Goal: Absence of Infection Signs and Symptoms  Outcome: Progressing     Problem: Labor  Goal: Hemostasis  Outcome: Progressing  Goal: Stable Fetal Wellbeing  Outcome: Progressing  Goal: Effective Progression to Delivery  Outcome: Progressing  Goal: Absence of Infection Signs and Symptoms  Outcome: Progressing  Goal: Acceptable Pain Control  Outcome: Progressing  Goal: Normal Uterine Contraction Pattern  Outcome: Progressing     Problem: Anesthesia/Analgesia, Neuraxial  Goal: Safe, Effective Infusion Delivery  Outcome: Progressing  Goal: Stable Patient-Fetal Status  Outcome: Progressing  Goal: Absence of Infection Signs and Symptoms  Outcome: Progressing  Goal: Nausea and Vomiting Relief  Outcome: Progressing  Goal: Effective Pain Control  Outcome: Progressing  Goal: Effective Oxygenation and Ventilation  Outcome: Progressing  Goal: Baseline Motor Function Return  Outcome: Progressing  Goal: Effective Urinary Elimination  Outcome: Progressing     Problem: Fall Injury Risk  Goal: Absence of Fall and Fall-Related Injury  Outcome: Progressing

## 2024-09-30 NOTE — PROGRESS NOTES
LABOR NOTE    S:  Complaints: No.  Epidural Working:  not applicable  Resident to bedside for routine cervical check     O: /69   Pulse 85   Temp 97.8 °F (36.6 °C)   Resp 16   LMP 01/10/2024 (Approximate)   SpO2 100%   Breastfeeding No     FHT: 135, moderate variability, +accels, - decel Cat 1 (reassuring)  CTX: q 2-3 minutes, pit @ 6  SVE: 3/50/-3    TIMELINE:   1300: 3/50/-3  2030: 3/50/-3, pit @6     PLAN:      Continue Close Maternal/Fetal Monitoring  Low dose Pitocin Augmentation per protocol  Recheck 2-4 hours or PRN     Adrianne Rosales MD (Mary)   Obstetrics and Gynecology, PGY1

## 2024-09-30 NOTE — PROGRESS NOTES
LABOR NOTE    S:  Complaints: No.  Epidural Working:  not applicable  Resident to bedside for routine cervical check     Language line used    O: /81   Pulse 75   Temp 98.2 °F (36.8 °C)   Resp 16   LMP 01/10/2024 (Approximate)   SpO2 100%   Breastfeeding No     FHT: 130bpm, moderate variability, +accels, - decel Cat 1 (reassuring)  CTX: q 3-4 minutes, pit @ 8  SVE: 3/50/-3    TIMELINE:   1300: 3/50/-3  2030: 3/50/-3, pit @6   0030: 3/50/-2; pit @ 8    PLAN:      Continue Close Maternal/Fetal Monitoring  Low dose Pitocin Augmentation per protocol  Patient to get epidural anesthesia then AROM once muñoz catheter is placed  Recheck 2-4 hours or PRN     Samira Berry MD  Obstetrics and Gynecology - PGY2

## 2024-09-30 NOTE — PROGRESS NOTES
LABOR NOTE    S:  Complaints: No.  Epidural Working:  Yes  Resident to bedside for routine cervical check     Language line used    O: /82   Pulse 74   Temp 97.9 °F (36.6 °C)   Resp 16   LMP 01/10/2024 (Approximate)   SpO2 100%   Breastfeeding No     FHT: 130bpm, moderate variability, +accels, - decel Cat 1 (reassuring)  CTX: q 2-3 minutes, pit @ 10  SVE: 3/50/-2    TIMELINE:   1300: 3/50/-3  2030: 3/50/-3, pit @6   0030: 3/50/-2; pit @ 8  0300: 3/50/-2; pit @ 10; AROM cl    PLAN:      Continue Close Maternal/Fetal Monitoring  Low dose Pitocin Augmentation per protocol  Recheck 2-4 hours or PRN     Samira Berry MD  Obstetrics and Gynecology - PGY2

## 2024-09-30 NOTE — BRIEF OP NOTE
[facility] - Labor & Delivery  Brief Operative Note  Cardiology    SUMMARY     Surgery Date: 2024     Surgeons and Role:     * Kenyetta Paniagua MD - Primary     * Abi Timmons MD - Resident - Assisting     * Ebonie Bustos MD - Resident - Assisting         Pre-op Diagnosis:  Failure to Progress/Failed IOL    Post-op Diagnosis: Post-Op Diagnosis Codes:     * Pregnant [Z34.90]  S/p pLTCS    Procedure(s) (LRB):   SECTION (N/A)    Operative Findings: Normal appearing uterus, fallopian tubes, and ovaries. Fetus delivered through hysterotomy in vertex presentation, vacuum applied once with one pop off. Nuchal cord reduced following delivery. Hysterotomy closed with chromic, muscle re-approximated with chromic suture, fascia reapproximated with PDS, subcutaneous layer closed with vicryl, skin closed with monocryl    Estimated Blood Loss: 550 cc         Specimens:   Specimen (24h ago, onward)      None          Abi Timmons MD, MPH  OBGYN PGY-4

## 2024-09-30 NOTE — PROGRESS NOTES
LABOR NOTE    S:  Complaints: No.  Epidural Working:  Yes  Resident to bedside for routine cervical check     Language line used    O: /78   Pulse 74   Temp 97.8 °F (36.6 °C) (Oral)   Resp 18   LMP 01/10/2024 (Approximate)   SpO2 100%   Breastfeeding No     FHT: 140bpm, moderate variability, +accels, - decel Cat 1 (reassuring)  CTX: q 2-3 minutes, pit @ 28  SVE: 3/50/-2     TIMELINE:   1300: 3/50/-3  2030: 3/50/-3, pit @6   0030: 3/50/-2; pit @ 8  0300: 3/50/-2; pit @ 10; AROM cl  0445: 3/50/02, pit @12, IUPC   0930: 3/50/-2, pit @ 28    PLAN:      Continue Close Maternal/Fetal Monitoring  Low dose Pitocin Augmentation per protocol  Recheck 2-4 hours or PRN      Ebonie Bustos MD  Obstetrics & Gynecology, PGY-1

## 2024-09-30 NOTE — CARE UPDATE
[facility]  ID# [id number], [place] used for this encounter.     BSUS performed. Cephalic presentation. BPP 4/8.    Discussed findings with MFM on-call, Dr. Bray, who recommends IOL at this time secondary to findings above.     Discussed findings with patient and partner at bedside. All questions addressed. Patient in agreement with plan and verbalizes understanding.    Plan:  - PCN, LD Pit ordered  - Diet: CLD  - Epidural per Anesthesia  - Continue close maternal/fetal monitoring  - Pitocin Augmentation per protocol  - Recheck 4 hours or PRN      Kwasi Rosales MD PGY2  Obstetrics and Gynecology    
PM NST    Baseline 130, mod vinicio, + accels, no decels  [place] without contractions  VSS and WNL  Per RN, patient denies contractions or pain right now    FHT reactive and reassuring. Continue NST BID, and close maternal symptom monitoring overnight.  
Resident to bedside for repeat SVE after patient reporting consistent contraction pain    SVE unchanged, still 3/50/-3  NST baseline 130, mod BTBV, + accels, - decels  [place]: irregular contractions, q7-8 minutes    Plan:  - Close maternal fetal monitoring  - Consider epidural if patient dilates further  - Start PCN for labor advancement    Deborah Smith MD  OB/GYN PGY-3   
Resident to bedside for repeat SVE after patient reporting consistent contraction pain.    SVE unchanged, still 3/50/-3  NST baseline 130, mod BTBV, + accels, -decels  [place]: irregular contractions, q3-5 minutes    Plan:  - Close maternal fetal monitoring  - Consider epidural if patient dilates further  - Continue PCN    Kwasi Rosales MD PGY2  Obstetrics and Gynecology    
Resident to bedside to discuss that she meets criteria for failed induction of labor.   Discussed risks and benefits with continuing IOL vs proceeding with  section. Patient would like to proceed with  at this time. Primary OB notified.     Ebonie Bustos MD  Obstetrics & Gynecology, PGY-1    
To bedside at 1730 to assess patient. Language line  used for entire encounter. Patient reports contractions and cramping have significantly reduced, she has not had pain for the past few hours. She reports noting some mucous discharge when wiping. Toilet paper visualized with small amount of mucous discharge, nothing concerning. She denies other symptoms. Repeat SVE unchanged, cervix posterior, 3/50/-3, which is unchanged since midnight. FHT R&R, 145 baseline, mod vinicio, + accels, no decels. Patient has been on continuous all day with no decelerations. No ctx on toco. BPP 8/8, MVP 3.27cm. Discussed overall reassuring fetal status and clinical stability with no concern for labor at this time. Discussed plan for NST BID, regular diet, and continued inpatient monitoring overnight, with plan for discharge tomorrow if patient continues to remain stable. All questions answered at this time, and patient agrees with the plan.  
Immediate family member

## 2024-09-30 NOTE — PROGRESS NOTES
Duplicate encounter, closing per Director   LABOR NOTE    S:  Complaints: No.  Epidural Working:  Yes  Resident to bedside for routine cervical check     Language line used    O: /76   Pulse 71   Temp 97.9 °F (36.6 °C)   Resp 16   LMP 01/10/2024 (Approximate)   SpO2 100%   Breastfeeding No     FHT: 125bpm, moderate variability, +accels, - decel Cat 1 (reassuring)  CTX: q 2-3 minutes, pit @ 12  SVE: 3/50/-2, IUPC     TIMELINE:   1300: 3/50/-3  2030: 3/50/-3, pit @6   0030: 3/50/-2; pit @ 8  0300: 3/50/-2; pit @ 10; AROM cl  0445: 3/50/02, pit @12, IUPC     PLAN:      Continue Close Maternal/Fetal Monitoring  Low dose Pitocin Augmentation per protocol  Recheck 2-4 hours or PRN     Adrianne Rosales MD (Mary)   Obstetrics and Gynecology, PGY1

## 2024-09-30 NOTE — TRANSFER OF CARE
"Anesthesia Transfer of Care Note    Patient: Sri Gomez    Procedure(s) Performed: * No procedures listed *    Patient location: Labor and Delivery    Anesthesia Type: epidural    Transport from OR: Transported from OR on room air with adequate spontaneous ventilation    Post pain: adequate analgesia    Post assessment: no apparent anesthetic complications and tolerated procedure well    Post vital signs: stable    Level of consciousness: awake, alert and oriented    Nausea/Vomiting: no nausea/vomiting    Complications: none    Transfer of care protocol was followed      Last vitals: Visit Vitals  /70   Pulse 81   Temp 36.8 °C (98.3 °F) (Oral)   Resp 18   Ht 5' 3" (1.6 m)   Wt 78 kg (171 lb 15.3 oz)   LMP 01/10/2024 (Approximate)   SpO2 100%   Breastfeeding No   BMI 30.46 kg/m²     "

## 2024-09-30 NOTE — NURSING
Blood glucose taken with result of 68mg/dL. MD notified. Patient may have juice and will recheck blood glucose after an hour as ordered.

## 2024-09-30 NOTE — PROGRESS NOTES
LABOR NOTE    S:  Complaints: No.  Epidural Working:  Yes  Resident to bedside for routine cervical check     Language line used    O: /83   Pulse 74   Temp 97.9 °F (36.6 °C) (Oral)   Resp 18   LMP 01/10/2024 (Approximate)   SpO2 100%   Breastfeeding No     FHT: 135bpm, moderate variability, +accels, - decel Cat 1 (reassuring)  CTX: q 5-6 minutes, pit @ 32  SVE: 3/50/-2     TIMELINE:   1300: 3/50/-3  2030: 3/50/-3, pit @6   0030: 3/50/-2; pit @ 8  0300: 3/50/-2; pit @ 10; AROM cl  0445: 3/50/02, pit @12, IUPC   0930: 3/50/-2, pit @ 28  1330: 3/50/-2, pit @ 32, At max dose for 1 hour, pit paused. IUPC replaced     PLAN:      Continue Close Maternal/Fetal Monitoring  Low dose Pitocin Augmentation per protocol  Recheck 2-4 hours or PRN      Ebonie Bustos MD  Obstetrics & Gynecology, PGY-1

## 2024-09-30 NOTE — NURSING
Blood glucose taken with result of 68mg/dL, result did not cross over to epic. MD notified. Patient may have juice and will recheck blood glucose after an hour.

## 2024-09-30 NOTE — L&D DELIVERY NOTE
[facility] - Labor & Delivery   Section   Operative Note    SUMMARY      Section Procedure Note    Procedure:   1. Primary Low Transverse   Section via Pfannensteil skin incision    Indications:   Failed Induction     Pre-operative Diagnosis:   1. IUP at 36 week 6 day pregnancy  2. T2DM  3. gHTN   4. Anemia    Post-operative Diagnosis:   same    Surgeon: Kenyetta Paniagua MD      Assistants: Ebonie Bustos MD - PGY1    Anesthesia: Epidural anesthesia    Findings:    1. Single viable  male infant, with APGARS 7/9, weight 3250g.   2. Normal appearing uterus, ovaries, and fallopian tubes.  3. Normal placenta    Estimated Blood Loss:  545cc           Total IV Fluids:see anesthesia note      UOP: see anesthesia note     Specimens: none    PreOp CBC:   Lab Results   Component Value Date    WBC 8.72 2024    HGB 10.9 (L) 2024    HCT 34.0 (L) 2024    MCV 74 (L) 2024     2024                     Complications:  None; patient tolerated the procedure well.           Disposition: PACU - hemodynamically stable.           Condition: stable    Procedure Details   The patient was seen in the Holding Room. The risks, benefits, complications, treatment options, and expected outcomes were discussed with the patient.  The patient concurred with the proposed plan, giving informed consent.  The patient was taken to Operating Room, identified as Sri Gomez and the procedure verified as Primary Low Transverse  Delivery. A Time Out was held and the above information confirmed.    After induction of anesthesia, the patient was prepped and draped in the usual sterile manner while placed in a dorsal supine position with a left lateral tilt.  A muñoz catheter was also placed per nursing. Preoperative antibiotics Ancef 2 g and azithromycin 500 mg were administered. An allis test was performed to confirm adequate anesthesia.  A Pfannenstiel skin  incision was made and carried down through the subcutaneous tissue to the fascia. Fascial incision was made and extended transversely. The fascia was grasped with [facility] clamps and  from the underlying rectus tissue superiorly and inferiorly. The peritoneum was identified, found to be free of adherent bowel, and entered bluntly. The peritoneal incision was extended longitudinally. The vesico-uterine peritoneum was identified, and bladder blade was inserted. The vesico-uterine peritoneum was incised transversely and the bladder flap was bluntly freed from the lower uterine segment. The bladder blade was reinserted to keep the bladder out of the operative field. A low transverse uterine incision was made with knife and extended bluntly. The amniotic sac was already ruptured upon entry to the hysterotomy and the infant was noted to be in cephalic presentation. The head was brought to the incision and elevated out of the pelvis. The patient delivered a single viable male infant without difficulty.  Infant weighed 3250 grams with Apgar scores of 7/9 at one and five minutes respectively. After the umbilical cord was clamped and cut, cord blood was obtained for evaluation. The placenta was removed intact, appeared normal, and was ***. The uterus was exteriorized. The uterine outline, tubes and ovaries appeared normal***. The uterine incision was closed with running locked sutures of 1 chromic***. Excellent*** hemostasis was observed.  The uterus was then returned to the abdominal cavity. Incision was reinspected and good hemostasis was noted. The abdominal cavity was irrigated to remove clots. The peritoneum*** and muscle were reapproximated with 2-0 vicryl and 1-0 chromic*** respectively. The fascia was then reapproximated with running sutures of 1 PDS***. The subcutaneous fat*** was reapproximated with ***, and skin was reapproximated with 4-0 monocryl*** in a subcuticular fashion.    Instrument, sponge, and  "needle counts were correct prior the abdominal closure and at the conclusion of the case.     The patient tolerated procedure well and was taken to the recovery room in ***stable condition after the procedure.    **NOTE: This patient IS a candidate for trial of labor after  delivery**    Ebonie Bustos MD  Obstetrics & Gynecology, PGY-1           Specimens:   Specimen (24h ago, onward)      None            Condition: Good    VTE Risk Mitigation (From admission, onward)           Ordered     IP VTE HIGH RISK PATIENT  Once         24 1700     Place sequential compression device  Until discontinued         24 1700     Place sequential compression device  Until discontinued         24 1347                    Disposition: PACU - hemodynamically stable.    Attestation: Good         Delivery Information for Jordon Gomez    Birth information:  YOB: 2024   Time of birth: 4:05 PM   Sex: male   Head Delivery Date/Time: 2024  4:05 PM   Delivery type: , Low Transverse   Gestational Age: 36w6d       Delivery Providers    Delivering clinician: Kenyetta Paniagua MD   Provider Role    Abi Timmons MD Resident    Ebonie Bustos MD Resident    Shayna Parekh ST Scrub Person    Debbie Marquez RN Circulator    Angela Lee MD Anesthesiologist              Measurements    Weight: 3250 g  Weight (lbs): 7 lb 2.6 oz  Length: 49.5 cm  Length (in): 19.5"  Head circumference: 34.3 cm  Chest circumference: 33 cm         Apgars    Living status: Living  Apgar Component Scores:  1 min.:  5 min.:  10 min.:  15 min.:  20 min.:    Skin color:  0  1       Heart rate:  2  2       Reflex irritability:  2  2       Muscle tone:  2  2       Respiratory effort:  1  2       Total:  7  9       Apgars assigned by: CLARKE MORALES RN         Operative Delivery    Forceps attempted?: No  Vacuum extractor attempted?: No         Shoulder Dystocia    Shoulder dystocia present?: " No                 Interventions/Resuscitation    Method: Bulb Suctioning, Tactile Stimulation, Deep Suctioning, CPAP       Cord    Vessels: 3 vessels  Complications: Nuchal  Delayed Cord Clamping?: No  Cord Clamped Date/Time: 2024  4:05 PM  Cord Blood Disposition: Sent with Baby  Gases Sent?: Yes  Stem Cell Collection (by MD): No       Placenta    Placenta delivery date/time: 2024 1607  Placenta removal: Spontaneous  Placenta appearance: Intact  Placenta disposition: Discarded           Labor Events:       labor: Yes     Labor Onset Date/Time:         Dilation Complete Date/Time:         Start Pushing Date/Time:         Start Pushing Date/Time:       Rupture Date/Time: 24 030        Rupture type: ARM (Artificial Rupture)        Fluid Amount:       Fluid Color: Clear              steroids:       Antibiotics given for GBS: Yes     Induction: oxytocin     Indications for induction:        Augmentation: amniotomy     Indications for augmentation: Ineffective Contraction Pattern     Labor complications: Failure to Progress in First Stage     Additional complications:          Cervical ripening:                     Delivery:      Episiotomy:       Indication for Episiotomy:       Perineal Lacerations:   Repaired:      Periurethral Laceration:   Repaired:     Labial Laceration:   Repaired:     Sulcus Laceration:   Repaired:     Vaginal Laceration:   Repaired:     Cervical Laceration:   Repaired:     Repair suture:       Repair # of packets:       Last Value - EBL - Nursing (mL):       Sum - EBL - Nursing (mL): 0     Last Value - EBL - Anesthesia (mL):      Calculated QBL (mL): 545     Running total QBL (mL): 545     Vaginal Sweep Performed:       Surgicount Correct:       Vaginal Packing:   Quantity:       Other providers:       Anesthesia    Method: Epidural          Details (if applicable):  Trial of Labor Yes    Categorization: Primary    Priority: Urgent    Indications for : Failed induction   Incision Type: low transverse     Additional  information:  Forceps:    Vacuum:    Breech:    Observed anomalies    Other (Comments):

## 2024-10-01 PROBLEM — O13.3 GESTATIONAL HYPERTENSION, THIRD TRIMESTER: Status: ACTIVE | Noted: 2024-10-01

## 2024-10-01 LAB
BASOPHILS # BLD AUTO: 0.07 K/UL (ref 0–0.2)
BASOPHILS NFR BLD: 0.4 % (ref 0–1.9)
DIFFERENTIAL METHOD BLD: ABNORMAL
EOSINOPHIL # BLD AUTO: 0 K/UL (ref 0–0.5)
EOSINOPHIL NFR BLD: 0.1 % (ref 0–8)
ERYTHROCYTE [DISTWIDTH] IN BLOOD BY AUTOMATED COUNT: 17 % (ref 11.5–14.5)
HCT VFR BLD AUTO: 28 % (ref 37–48.5)
HGB BLD-MCNC: 8.7 G/DL (ref 12–16)
IMM GRANULOCYTES # BLD AUTO: 0.09 K/UL (ref 0–0.04)
IMM GRANULOCYTES NFR BLD AUTO: 0.5 % (ref 0–0.5)
LYMPHOCYTES # BLD AUTO: 2.7 K/UL (ref 1–4.8)
LYMPHOCYTES NFR BLD: 15.6 % (ref 18–48)
MCH RBC QN AUTO: 23.5 PG (ref 27–31)
MCHC RBC AUTO-ENTMCNC: 31.1 G/DL (ref 32–36)
MCV RBC AUTO: 76 FL (ref 82–98)
MONOCYTES # BLD AUTO: 0.7 K/UL (ref 0.3–1)
MONOCYTES NFR BLD: 3.9 % (ref 4–15)
NEUTROPHILS # BLD AUTO: 13.6 K/UL (ref 1.8–7.7)
NEUTROPHILS NFR BLD: 79.5 % (ref 38–73)
NRBC BLD-RTO: 0 /100 WBC
PLATELET # BLD AUTO: 298 K/UL (ref 150–450)
PMV BLD AUTO: 11.2 FL (ref 9.2–12.9)
POCT GLUCOSE: 101 MG/DL (ref 70–110)
POCT GLUCOSE: 124 MG/DL (ref 70–110)
POCT GLUCOSE: 127 MG/DL (ref 70–110)
POCT GLUCOSE: 136 MG/DL (ref 70–110)
POCT GLUCOSE: 72 MG/DL (ref 70–110)
POCT GLUCOSE: 87 MG/DL (ref 70–110)
POCT GLUCOSE: 94 MG/DL (ref 70–110)
RBC # BLD AUTO: 3.71 M/UL (ref 4–5.4)
WBC # BLD AUTO: 17.08 K/UL (ref 3.9–12.7)

## 2024-10-01 PROCEDURE — 25000003 PHARM REV CODE 250

## 2024-10-01 PROCEDURE — 85025 COMPLETE CBC W/AUTO DIFF WBC: CPT

## 2024-10-01 PROCEDURE — 99232 SBSQ HOSP IP/OBS MODERATE 35: CPT | Mod: ,,, | Performed by: OBSTETRICS & GYNECOLOGY

## 2024-10-01 PROCEDURE — 99499 UNLISTED E&M SERVICE: CPT | Mod: ,,, | Performed by: OBSTETRICS & GYNECOLOGY

## 2024-10-01 PROCEDURE — 36415 COLL VENOUS BLD VENIPUNCTURE: CPT

## 2024-10-01 PROCEDURE — 11000001 HC ACUTE MED/SURG PRIVATE ROOM

## 2024-10-01 PROCEDURE — 63600175 PHARM REV CODE 636 W HCPCS

## 2024-10-01 RX ORDER — ACETAMINOPHEN 325 MG/1
650 TABLET ORAL EVERY 6 HOURS PRN
Status: DISCONTINUED | OUTPATIENT
Start: 2024-10-01 | End: 2024-10-03 | Stop reason: HOSPADM

## 2024-10-01 RX ORDER — ACETAMINOPHEN 325 MG/1
650 TABLET ORAL EVERY 6 HOURS
Qty: 30 TABLET | Refills: 0 | Status: SHIPPED | OUTPATIENT
Start: 2024-10-01

## 2024-10-01 RX ORDER — LANOLIN ALCOHOL/MO/W.PET/CERES
1 CREAM (GRAM) TOPICAL DAILY
Status: DISCONTINUED | OUTPATIENT
Start: 2024-10-01 | End: 2024-10-03 | Stop reason: HOSPADM

## 2024-10-01 RX ORDER — IBUPROFEN 600 MG/1
600 TABLET ORAL EVERY 6 HOURS
Qty: 30 TABLET | Refills: 1 | Status: SHIPPED | OUTPATIENT
Start: 2024-10-01

## 2024-10-01 RX ORDER — DOCUSATE SODIUM 100 MG/1
200 CAPSULE, LIQUID FILLED ORAL 2 TIMES DAILY
Qty: 60 CAPSULE | Refills: 0 | Status: SHIPPED | OUTPATIENT
Start: 2024-10-02

## 2024-10-01 RX ORDER — OXYCODONE AND ACETAMINOPHEN 5; 325 MG/1; MG/1
1 TABLET ORAL EVERY 4 HOURS PRN
Qty: 15 TABLET | Refills: 0 | Status: SHIPPED | OUTPATIENT
Start: 2024-10-01

## 2024-10-01 RX ADMIN — ACETAMINOPHEN 650 MG: 325 TABLET, FILM COATED ORAL at 01:10

## 2024-10-01 RX ADMIN — PRENATAL VIT W/ FE FUMARATE-FA TAB 27-0.8 MG 1 TABLET: 27-0.8 TAB at 08:10

## 2024-10-01 RX ADMIN — OXYCODONE AND ACETAMINOPHEN 1 TABLET: 10; 325 TABLET ORAL at 09:10

## 2024-10-01 RX ADMIN — DOCUSATE SODIUM 200 MG: 100 CAPSULE, LIQUID FILLED ORAL at 08:10

## 2024-10-01 RX ADMIN — KETOROLAC TROMETHAMINE 30 MG: 30 INJECTION, SOLUTION INTRAMUSCULAR; INTRAVENOUS at 05:10

## 2024-10-01 RX ADMIN — INSULIN ASPART 2 UNITS: 100 INJECTION, SOLUTION INTRAVENOUS; SUBCUTANEOUS at 12:10

## 2024-10-01 RX ADMIN — ACETAMINOPHEN 650 MG: 325 TABLET, FILM COATED ORAL at 08:10

## 2024-10-01 RX ADMIN — KETOROLAC TROMETHAMINE 30 MG: 30 INJECTION, SOLUTION INTRAMUSCULAR; INTRAVENOUS at 12:10

## 2024-10-01 RX ADMIN — OXYCODONE HYDROCHLORIDE AND ACETAMINOPHEN 1 TABLET: 5; 325 TABLET ORAL at 05:10

## 2024-10-01 RX ADMIN — KETOROLAC TROMETHAMINE 30 MG: 30 INJECTION, SOLUTION INTRAMUSCULAR; INTRAVENOUS at 06:10

## 2024-10-01 RX ADMIN — KETOROLAC TROMETHAMINE 30 MG: 30 INJECTION, SOLUTION INTRAMUSCULAR; INTRAVENOUS at 11:10

## 2024-10-01 RX ADMIN — ACETAMINOPHEN 650 MG: 325 TABLET, FILM COATED ORAL at 04:10

## 2024-10-01 RX ADMIN — FERROUS SULFATE TAB 325 MG (65 MG ELEMENTAL FE) 1 EACH: 325 (65 FE) TAB at 08:10

## 2024-10-01 RX ADMIN — OXYCODONE AND ACETAMINOPHEN 1 TABLET: 10; 325 TABLET ORAL at 03:10

## 2024-10-01 RX ADMIN — OXYCODONE AND ACETAMINOPHEN 1 TABLET: 10; 325 TABLET ORAL at 11:10

## 2024-10-01 NOTE — LACTATION NOTE
10/01/24 1300   Maternal Assessment   Breast Shape Bilateral:;round   Breast Density Bilateral:;soft   Areola Bilateral:;firm   Nipples Bilateral:;retracting;flat   Left Nipple Symptoms   (thin linear scabs noted)   Maternal Infant Feeding   Maternal Emotional State assist needed;relaxed   Infant Positioning clutch/football;cross-cradle   Comfort Measures Before/During Feeding maternal position adjusted;infant position adjusted   Latch Assistance yes  (maximum positioning and latch assistance provided)   Equipment Type   Breast Pump Type double electric, hospital grade   Breast Pump Flange Type hard   Breast Pump Flange Size 21 mm   Breast Pumping   Breast Pumping Interventions post-feed pumping encouraged   Breast Pumping double electric breast pump utilized     Visited patient in room, [place]  present.  Patient stated her feeding plan at home is to pump and bottle feed the baby EBM c occasional breastfeeding sessions.  Basic education provided through the .  Assisted patient to begin use of the Symphony pump c the double collection kit using the Initiation pumping pattern c the most suction that was comfortable.  Pumping session was interrupted due to baby giving feeding cues.  Maximum positioning and latch assistance provided, L and R breasts, football and cross cradle positions, no latch achieved, baby sucks tongue and does not open mouth wide.  Baby latches onto nipple only and holds the nipple in his mouth.  Requested staff nurse to bring donor milk to supplement baby.   Instructions for use and care of the pump and collection kit provided.  Encouraged to call for assistance at the next feeding.

## 2024-10-01 NOTE — ANESTHESIA POSTPROCEDURE EVALUATION
Anesthesia Post Evaluation    Patient: Sri Gomez    Procedure(s) Performed: Procedure(s) (LRB):   SECTION (N/A)    Final Anesthesia Type: epidural      Patient location during evaluation: labor & delivery  Patient participation: Yes- Able to Participate  Level of consciousness: awake and alert, awake and oriented  Post-procedure vital signs: reviewed and stable  Pain management: adequate  Airway patency: patent  ROBIN mitigation strategies: Multimodal analgesia and Use of major conduction anesthesia (spinal/epidural) or peripheral nerve block  PONV status at discharge: No PONV  Anesthetic complications: no      Cardiovascular status: blood pressure returned to baseline and hemodynamically stable  Respiratory status: unassisted, spontaneous ventilation and room air  Hydration status: euvolemic  Follow-up not needed.              Vitals Value Taken Time   /63 10/01/24 1225   Temp 36.5 °C (97.7 °F) 10/01/24 1221   Pulse 84 10/01/24 1221   Resp 18 10/01/24 1221   SpO2 99 % 10/01/24 1221         Event Time   Out of Recovery 2024 19:10:00         Pain/Agustina Score: Pain Rating Prior to Med Admin: 2 (10/1/2024  1:56 PM)  Pain Rating Post Med Admin: 0 (10/1/2024 12:00 PM)

## 2024-10-01 NOTE — NURSING
Breast pumping initiated. Education provided on pump use, frequency and cleaning. Feeding plan discussed with patient to put baby to breast first, then pump and feed whatever amount she gets pumping to baby for each feed. Patient verbalized understanding.

## 2024-10-01 NOTE — LACTATION NOTE
Discussed the desired feeding choice with the patient. Mother offered additional supplementation with formula or donor milk. Mother requests use of donor milk. States understanding of all information and verbalized appropriate recall. Consents signed and placed in chart.

## 2024-10-01 NOTE — PROGRESS NOTES
POSTPARTUM PROGRESS NOTE    Subjective:     PPD/POD#: 1   Procedure: Primary LTCS failed induction of labor.   EGA: 37w0d   N/V: No   F/C: No   Abd Pain: Mild, well-controlled with oral pain medication   Lochia: Mild   Voiding: Yes   Ambulating: No   Bowel fnc: No, pending passing gas   Contraception: Unsure, Continue discussion     Patient reported pain was not well controlled, but only took 1x oxy5 since surgery. She was not aware she could ask for more medications. Patient was counseled about pain control after  and voiced understanding, will continue to monitor pain throughout the day.     Objective:      Temp:  [97.4 °F (36.3 °C)-98.6 °F (37 °C)] 98.2 °F (36.8 °C)  Pulse:  [] 80  Resp:  [16-20] 17  SpO2:  [97 %-100 %] 97 %  BP: ()/(54-98) 93/54    Abdomen: Soft, appropriately tender   Uterus: Firm, no fundal tenderness   Incision: Pressure dressing removed, silver dressing with some shadowing present on left side, marked.      Lab Review    Recent Labs   Lab 24      K 4.1   *   CO2 14*   BUN 10   CREATININE 0.6   *   PROT 6.5   BILITOT 0.3   ALKPHOS 219*   ALT 10   AST 13       Recent Labs   Lab 24   WBC 8.72   HGB 10.9*   HCT 34.0*   MCV 74*            I/O    Intake/Output Summary (Last 24 hours) at 10/1/2024 0632  Last data filed at 10/1/2024 0557  Gross per 24 hour   Intake 3183.18 ml   Output 3370 ml   Net -186.82 ml        Assessment and Plan:   Postpartum care:  - Patient doing well.  - Continue routine management and advances.    T2DM  - Currently Lantus 44u qD (pre preg regimen, 10/01)  - BG as below            F pB/ppB pL/ppL pD/ppD qHS    117>218>228 (2u)>136   10/01 87    - Preg Regimen: NPH 22/x/38, Aspart 5/x/6    gHTN  - BP as above  - asymptomatic  - preE labs as above  - UOP: appropriate  - Mag: not indicated  - Hypertensive agent no meds    Anemia  - continue Fe/colace  - asymptomatic.      Finesse Damico,  MD  Obstetrics and Gynecology- PGY2

## 2024-10-01 NOTE — PLAN OF CARE
"Plan of care:  Patient will hold baby skin-to-skin as much as possible; will nurse baby on cue  "8 or more in 24" for up to 30 min if actively sucking;  if baby is unable to achieve and sustain a deep latch, patient will double pump breasts at each feeding using the Initiation pumping pattern c the most suction that is comfortable; will supplement baby at each feeding c EBM/donor milk ad go; will monitor the baby's 24 hr diaper counts; will care for the collection kit as instructed; will increase calories, fluids, and rest; will call for assistance prn.     "

## 2024-10-02 LAB
POCT GLUCOSE: 101 MG/DL (ref 70–110)
POCT GLUCOSE: 102 MG/DL (ref 70–110)
POCT GLUCOSE: 107 MG/DL (ref 70–110)
POCT GLUCOSE: 116 MG/DL (ref 70–110)
POCT GLUCOSE: 125 MG/DL (ref 70–110)
POCT GLUCOSE: 137 MG/DL (ref 70–110)

## 2024-10-02 PROCEDURE — 25000003 PHARM REV CODE 250

## 2024-10-02 PROCEDURE — 11000001 HC ACUTE MED/SURG PRIVATE ROOM

## 2024-10-02 RX ORDER — LIDOCAINE 50 MG/G
1 PATCH TOPICAL
Status: DISCONTINUED | OUTPATIENT
Start: 2024-10-02 | End: 2024-10-03 | Stop reason: HOSPADM

## 2024-10-02 RX ADMIN — LIDOCAINE 1 PATCH: 50 PATCH CUTANEOUS at 08:10

## 2024-10-02 RX ADMIN — FERROUS SULFATE TAB 325 MG (65 MG ELEMENTAL FE) 1 EACH: 325 (65 FE) TAB at 08:10

## 2024-10-02 RX ADMIN — DOCUSATE SODIUM 200 MG: 100 CAPSULE, LIQUID FILLED ORAL at 07:10

## 2024-10-02 RX ADMIN — OXYCODONE AND ACETAMINOPHEN 1 TABLET: 10; 325 TABLET ORAL at 12:10

## 2024-10-02 RX ADMIN — OXYCODONE AND ACETAMINOPHEN 1 TABLET: 10; 325 TABLET ORAL at 03:10

## 2024-10-02 RX ADMIN — DOCUSATE SODIUM 200 MG: 100 CAPSULE, LIQUID FILLED ORAL at 08:10

## 2024-10-02 RX ADMIN — OXYCODONE AND ACETAMINOPHEN 1 TABLET: 10; 325 TABLET ORAL at 09:10

## 2024-10-02 RX ADMIN — OXYCODONE AND ACETAMINOPHEN 1 TABLET: 10; 325 TABLET ORAL at 08:10

## 2024-10-02 RX ADMIN — IBUPROFEN 800 MG: 400 TABLET ORAL at 10:10

## 2024-10-02 RX ADMIN — IBUPROFEN 800 MG: 400 TABLET ORAL at 03:10

## 2024-10-02 RX ADMIN — OXYCODONE AND ACETAMINOPHEN 1 TABLET: 10; 325 TABLET ORAL at 04:10

## 2024-10-02 RX ADMIN — IBUPROFEN 800 MG: 400 TABLET ORAL at 05:10

## 2024-10-02 RX ADMIN — PRENATAL VIT W/ FE FUMARATE-FA TAB 27-0.8 MG 1 TABLET: 27-0.8 TAB at 08:10

## 2024-10-02 RX ADMIN — SIMETHICONE 80 MG: 80 TABLET, CHEWABLE ORAL at 12:10

## 2024-10-02 NOTE — LACTATION NOTE
10/02/24 1020   Maternal Assessment   Breast Shape Bilateral:;round   Breast Density Bilateral:;soft   Areola Bilateral:;elastic   Nipples Bilateral:;graspable   Maternal Infant Feeding   Maternal Emotional State assist needed   Latch Assistance no  (declines latch at this time)   Breastfeeding Supplementation   Infant Indication for Supplementation maternal request   Breastfeeding Supplementation Type donor breast milk;expressed breast milk;formula   Method of Supplementation paced bottle   Nipple Used For Supplementation slow flow   Equipment Type   Breast Pump Type double electric, hospital grade   Breast Pump Flange Type hard   Breast Pump Flange Size 21 mm   Breast Pumping   Breast Pumping Interventions post-feed pumping encouraged;frequent pumping encouraged;early pumping promoted  (encouraged to pump mroe frequently, last pumped yesterday with LC)   Breast Pumping bilateral breasts pumped until soft;double electric breast pump utilized   Community Referrals   Community Referrals support group;pediatric care provider;outpatient lactation program  ([place] Community resources given/ first alert questionaire, [facility]/DME/LDH paperwork to get a pump)     Utilizing in person , Sondra, all education and questions answered. Discharge lactation education reviewed with [place] breastfeeding guide along with community resources. Plan is to mostly pump and bottle feed, also use formula at home and latch sometimes. Educated to keep practicing latch so baby can learn to latch, patient last pumped yesterday afternoon, educated on supply and demand and encouraged to remain positive of milk supply, today is day 3 and need to pump each time baby feeds in order to demand the supply to match baby's demands.   Assisted with pumping session and provided new white membranes. Educated to pump 8x/day, clean parts after each use, sterilize daily. Milk storage/handling reviewed and engorgement. Reviewed initiate setting  versus maintain setting on symphony pump.     All pap for FOB to go  pump from [facility] to have pump for home.

## 2024-10-02 NOTE — PROGRESS NOTES
POSTPARTUM PROGRESS NOTE    Subjective:     PPD/POD#: 2   Procedure: Primary LTCS failed induction of labor.   EGA: 37w0d   N/V: No   F/C: No   Abd Pain: Mild, well-controlled with oral pain medication   Lochia: Mild   Voiding: Yes   Ambulating: No   Bowel fnc: yes   Contraception: Discussed again today, patient will discuss with partner.        Objective:      Temp:  [97.7 °F (36.5 °C)-98.5 °F (36.9 °C)] 97.9 °F (36.6 °C)  Pulse:  [84-91] 91  Resp:  [16-18] 17  SpO2:  [97 %-100 %] 100 %  BP: (100-107)/(59-67) 106/67    Abdomen: Soft, appropriately tender   Uterus: Firm, no fundal tenderness   Incision: Stable shadowing, bandage in place.     Lab Review    Recent Labs   Lab 09/27/24 2031      K 4.1   *   CO2 14*   BUN 10   CREATININE 0.6   *   PROT 6.5   BILITOT 0.3   ALKPHOS 219*   ALT 10   AST 13       Recent Labs   Lab 09/27/24  2031 10/01/24  0602   WBC 8.72 17.08*   HGB 10.9* 8.7*   HCT 34.0* 28.0*   MCV 74* 76*    298         I/O    Intake/Output Summary (Last 24 hours) at 10/2/2024 0637  Last data filed at 10/1/2024 1418  Gross per 24 hour   Intake --   Output 1100 ml   Net -1100 ml        Assessment and Plan:   Postpartum care:  - Patient doing well.  - Continue routine management and advances.    T2DM  - prePrep regimen in [place] was metformin 850 daily. Stopped 2 years ago when she moved to the [facility].   - Currently holding Lantus 44u qD given well controlled sugar.  - BG as below    09/30 117>218>228 (2u)>136   10/01 87 94 72/101 124 127   10/02 102     - Preg Regimen: NPH 22/x/38, Aspart 5/x/6  - Plan to establish care with endocrinologist for diabetes management after discharge. Will place referral.     gHTN  - BP as above  - asymptomatic  - preE labs as above  - UOP: appropriate  - Mag: not indicated  - Hypertensive agent no meds    Anemia  - continue Fe/colace  - asymptomatic.      Finesse Damico MD  Obstetrics and Gynecology- PGY2

## 2024-10-02 NOTE — PLAN OF CARE
Mother to breastfeed infant 8 or more times in 24hrs on infant's cue until content, frequent skin to skin, and will avoid pacifiers.  Mother is to keep infant actively feeding by keeping infant stimulated and using breast compression. Mother ensure effective nursing by hearing infant swallows and feeling nice tugs and pulls. Latch should not be painful while nursing.  Mother to record all breastfeedings, voids and stools in breastfeeding guide. Mother to call LC for breastfeeding assistance or questions .  Refer breastfeeding guide for lactation education.       Attempt latch, pump each feeding 8x/day, supplement.

## 2024-10-02 NOTE — NURSING
The following message was sent to dr rosales's staff:  Hi, can you please call ms handy to schedule a post partum bp check appt on or before Wednesday 10/9, please remove  dressing at time of appt. Pt was asked to keep bg log to review  (will check bg 4x/day) thank you   NOTE: OB resident informed of and ok'ed above appt.

## 2024-10-03 VITALS
HEART RATE: 75 BPM | BODY MASS INDEX: 30.46 KG/M2 | DIASTOLIC BLOOD PRESSURE: 73 MMHG | WEIGHT: 171.94 LBS | RESPIRATION RATE: 20 BRPM | HEIGHT: 63 IN | SYSTOLIC BLOOD PRESSURE: 119 MMHG | TEMPERATURE: 98 F | OXYGEN SATURATION: 96 %

## 2024-10-03 LAB — POCT GLUCOSE: 96 MG/DL (ref 70–110)

## 2024-10-03 PROCEDURE — 25000003 PHARM REV CODE 250

## 2024-10-03 PROCEDURE — 99238 HOSP IP/OBS DSCHRG MGMT 30/<: CPT | Mod: ,,, | Performed by: OBSTETRICS & GYNECOLOGY

## 2024-10-03 RX ORDER — FUROSEMIDE 20 MG/1
20 TABLET ORAL DAILY
Status: DISCONTINUED | OUTPATIENT
Start: 2024-10-03 | End: 2024-10-03 | Stop reason: HOSPADM

## 2024-10-03 RX ORDER — FUROSEMIDE 20 MG/1
20 TABLET ORAL DAILY
Qty: 4 TABLET | Refills: 0 | Status: SHIPPED | OUTPATIENT
Start: 2024-10-04 | End: 2024-10-08

## 2024-10-03 RX ADMIN — OXYCODONE AND ACETAMINOPHEN 1 TABLET: 10; 325 TABLET ORAL at 05:10

## 2024-10-03 RX ADMIN — PRENATAL VIT W/ FE FUMARATE-FA TAB 27-0.8 MG 1 TABLET: 27-0.8 TAB at 08:10

## 2024-10-03 RX ADMIN — FERROUS SULFATE TAB 325 MG (65 MG ELEMENTAL FE) 1 EACH: 325 (65 FE) TAB at 08:10

## 2024-10-03 RX ADMIN — DOCUSATE SODIUM 200 MG: 100 CAPSULE, LIQUID FILLED ORAL at 08:10

## 2024-10-03 RX ADMIN — LIDOCAINE 1 PATCH: 50 PATCH CUTANEOUS at 08:10

## 2024-10-03 RX ADMIN — IBUPROFEN 800 MG: 400 TABLET ORAL at 05:10

## 2024-10-03 RX ADMIN — FUROSEMIDE 20 MG: 20 TABLET ORAL at 10:10

## 2024-10-03 RX ADMIN — OXYCODONE AND ACETAMINOPHEN 1 TABLET: 10; 325 TABLET ORAL at 12:10

## 2024-10-03 NOTE — PROGRESS NOTES
POSTPARTUM PROGRESS NOTE    Subjective:     PPD/POD#: 3   Procedure: Primary LTCS failed induction of labor.   EGA: 37w0d   N/V: No   F/C: No   Abd Pain: Mild, well-controlled with oral pain medication   Lochia: Mild   Voiding: Yes   Ambulating: Yes   Bowel fnc: yes   Contraception: To discuss with partner.    Pain well controlled with PO meds. Reports increased swelling and discomfort of bilateral lower extremities    Objective:      Temp:  [98 °F (36.7 °C)-98.3 °F (36.8 °C)] 98.3 °F (36.8 °C)  Pulse:  [86-92] 86  Resp:  [16-18] 17  SpO2:  [98 %-100 %] 98 %  BP: (105-109)/(63-64) 109/64    Abdomen: Soft, appropriately tender   Uterus: Firm, no fundal tenderness   Incision: Stable shadowing, bandage in place.   Extremities: no pitting edema, non tender    Lab Review    Recent Labs   Lab 09/27/24 2031      K 4.1   *   CO2 14*   BUN 10   CREATININE 0.6   *   PROT 6.5   BILITOT 0.3   ALKPHOS 219*   ALT 10   AST 13       Recent Labs   Lab 09/27/24  2031 10/01/24  0602   WBC 8.72 17.08*   HGB 10.9* 8.7*   HCT 34.0* 28.0*   MCV 74* 76*    298         I/O  No intake or output data in the 24 hours ending 10/03/24 0633       Assessment and Plan:   Postpartum care:  - Patient doing well.  - Continue routine management and advances.    T2DM  - prePreg regimen in [place] was metformin 850 daily. Stopped 2 years ago when she moved to the [facility].   - Currently holding Lantus 44u qD given well controlled BG  - BG as below    09/30 117>218>228 (2u)>136   10/01 87 94 72/101 124 127   10/02 102   101   107/116  137  125  10/03      - Preg Regimen: NPH 22/x/38, Aspart 5/x/6  - Plan to establish care with endocrinologist for diabetes management after discharge. Will place referral.     gHTN  - BP as above  - asymptomatic  - preE labs as above  - UOP: voiding spontaneously  - Mag: not indicated  - Hypertensive agent: no meds  -Pt reports increased swelling today of bilateral lower extremities causing discomfort,  no pitting edema on exam, encouraged ambulation and elevation of feet  --Can consider lasix if above modifications above do not help relieve the discomfort    Anemia  - continue Fe/colace  - asymptomatic.      Abi Timmons MD, MPH  OBGYN PGY-4

## 2024-10-03 NOTE — DISCHARGE SUMMARY
Delivery Discharge Summary  Obstetrics      Primary OB Clinician: Uvaldo Wyatt MD      Admission date: 2024  Discharge date: 10/03/2024    Disposition: To home, self care    Discharge Diagnosis List:      Patient Active Problem List   Diagnosis    Type 2 diabetes mellitus affecting pregnancy in first trimester, antepartum    Vaginal bleeding during pregnancy    Encounter for supervision of normal pregnancy in third trimester    Anemia affecting pregnancy in third trimester    Status post primary low transverse  section    Gestational hypertension, third trimester       Procedure: , due to failed induction of labor    Hospital Course:  Sri Gomez is a 34 y.o. now , POD #3 who was admitted on 2024 at 36w6d for induction of labor. Patient was subsequently admitted to labor and delivery unit with signed consents.     Labor course was complicated by failed induction of labor, and decision was made to proceed with delivery via  which was performed without complications.    Please see delivery note for further details. Her postpartum course was complicated by T2DM for which her baseline dose of lantus 44u QD was held throughout the entirety of her PP course due to lower/well controlled BG. Ambulatory referral placed to endocrinology upon discharge. Pt also did not require initiation of any anti-hypertensive medications prior to discharge for her gHTN. On discharge day, patient's pain is controlled with oral pain medications. Pt is tolerating ambulation without SOB or CP, and regular diet without N/V. Reports lochia is mild. Denies any HA, vision changes, F/C, LE swelling. Denies any breast pain/soreness.    Pt in stable condition and ready for discharge. She has been instructed to start and/or continue medications and follow up with her obstetrics provider as listed below.    Vital Signs:   Temp:  [98 °F (36.7 °C)-98.3 °F (36.8 °C)] 98 °F (36.7  "°C)  Pulse:  [75-86] 75  Resp:  [17-18] 18  SpO2:  [96 %-98 %] 96 %  BP: (109-119)/(64-73) 119/73      Pertinent studies:  CBC  Recent Labs   Lab 09/27/24  2031 10/01/24  0602   WBC 8.72 17.08*   HGB 10.9* 8.7*   HCT 34.0* 28.0*   MCV 74* 76*    298          There is no immunization history for the selected administration types on file for this patient.     Delivery:    Episiotomy:     Lacerations:     Repair suture:     Repair # of packets:     Blood loss (ml):       Birth information:  YOB: 2024   Time of birth: 4:05 PM   Sex: male   Delivery type: , Low Transverse   Gestational Age: 36w6d     Measurements    Weight: 3250 g  Weight (lbs): 7 lb 2.6 oz  Length: 49.5 cm  Length (in): 19.5"  Head circumference: 34.3 cm  Chest circumference: 33 cm         Delivery Clinician: Delivery Providers    Delivering clinician: Kenyetta Paniagua MD   Provider Role    Abi Timmons MD Resident    Ebonie Bustos MD Resident    [facility]    Debbie Marquez RN Circulator    Angela Lee MD Anesthesiologist    Wily Roman MD Anesthesiologist             Additional  information:  Forceps:    Vacuum:    Breech:    Observed anomalies      Living?:     Apgars    Living status: Living  Apgar Component Scores:  1 min.:  5 min.:  10 min.:  15 min.:  20 min.:    Skin color:  0  1       Heart rate:  2  2       Reflex irritability:  2  2       Muscle tone:  2  2       Respiratory effort:  1  2       Total:  7  9       Apgars assigned by: CLARKE MORALES RN         Placenta: Delivered:       appearance    Patient Instructions:   Current Discharge Medication List        START taking these medications    Details   acetaminophen (TYLENOL) 325 MG tablet Take 2 tablets (650 mg total) by mouth every 6 (six) hours.  Qty: 30 tablet, Refills: 0    Associated Diagnoses: Status post primary low transverse  section      docusate sodium (COLACE) 100 MG capsule Take 2 capsules (200 mg " "total) by mouth 2 (two) times daily.  Qty: 60 capsule, Refills: 0    Associated Diagnoses: Status post primary low transverse  section      furosemide (LASIX) 20 MG tablet Take 1 tablet (20 mg total) by mouth once daily. for 4 days  Qty: 4 tablet, Refills: 0      ibuprofen (ADVIL,MOTRIN) 600 MG tablet Take 1 tablet (600 mg total) by mouth every 6 (six) hours. Alternate between ibuprofen and tylenol every 3 hours. For example: @0800: ibuprofen 600mg @1100: tylenol 650mg @1400: ibuprofen 600mg @1700: tylenol 650 mg @2000: ibuprofen 600mg  Qty: 30 tablet, Refills: 1    Associated Diagnoses: Status post primary low transverse  section      oxyCODONE-acetaminophen (PERCOCET) 5-325 mg per tablet Take 1 tablet by mouth every 4 (four) hours as needed for Pain.  Qty: 15 tablet, Refills: 0    Comments: Quantity prescribed more than 7 day supply? No  Associated Diagnoses: Status post primary low transverse  section           CONTINUE these medications which have NOT CHANGED    Details   blood-glucose sensor (DEXCOM G7 SENSOR) Rena 1 Device by Misc.(Non-Drug; Combo Route) route continuous.  Qty: 1 each, Refills: 12    Associated Diagnoses: Type 2 diabetes mellitus affecting pregnancy in first trimester, antepartum      famotidine (PEPCID) 40 MG tablet Take 1 tablet (40 mg total) by mouth every evening.  Qty: 30 tablet, Refills: 5      ferrous sulfate 325 (65 FE) MG EC tablet Take 1 tablet (325 mg total) by mouth once daily.  Qty: 90 tablet, Refills: 0      metoclopramide HCl (REGLAN) 10 MG tablet Take 1 tablet (10 mg total) by mouth every 6 (six) hours as needed (nausea and vomiting).  Qty: 30 tablet, Refills: 1    Associated Diagnoses: Nausea and vomiting during pregnancy      multivit with minerals/lutein (MULTIVITAMIN 50 PLUS ORAL) Take by mouth.      pen needle, diabetic 32 gauge x 5/32" Ndle 1 Syringe by Misc.(Non-Drug; Combo Route) route 4 (four) times daily.  Qty: 200 each, Refills: 3    " Associated Diagnoses: Type 2 diabetes mellitus affecting pregnancy in first trimester, antepartum      polyethylene glycol (GLYCOLAX) 17 gram PwPk Take 17 g by mouth once daily.  Qty: 10 each, Refills: 0    Associated Diagnoses: Constipation, unspecified constipation type      senna (SENOKOT) 8.6 mg tablet Take 1 tablet by mouth once daily.  Qty: 30 tablet, Refills: 1    Associated Diagnoses: Constipation, unspecified constipation type      TRUE METRIX GLUCOSE METER Misc use TO test blood glucose FOUR TIMES DAILY      TRUE METRIX GLUCOSE TEST STRIP Strp Inject 1 strip into the skin 4 (four) times daily.  Qty: 200 each, Refills: 2    Associated Diagnoses: Type 2 diabetes mellitus affecting pregnancy in first trimester, antepartum      TRUEPLUS LANCETS 33 gauge Misc Inject 1 lancet  into the skin 4 (four) times daily.  Qty: 200 each, Refills: 3    Associated Diagnoses: Type 2 diabetes mellitus affecting pregnancy in first trimester, antepartum           STOP taking these medications       aspirin (ECOTRIN) 81 MG EC tablet Comments:   Reason for Stopping:         folic acid (FOLVITE) 400 MCG tablet Comments:   Reason for Stopping:         insulin aspart U-100 (NOVOLOG) 100 unit/mL (3 mL) InPn pen Comments:   Reason for Stopping:         insulin NPH isoph U-100 human (HUMULIN N NPH INSULIN KWIKPEN) 100 unit/mL (3 mL) InPn Comments:   Reason for Stopping:         metFORMIN (GLUCOPHAGE) 850 MG tablet Comments:   Reason for Stopping:         terconazole (TERAZOL 3) 0.8 % vaginal cream Comments:   Reason for Stopping:               Discharge Procedure Orders   BREAST PUMP FOR HOME USE     Order Specific Question Answer Comments   Type of pump: Electric    Weight: 78 kg (171 lb 15.3 oz)    Length of need (1-99 months): 99      Ambulatory referral/consult to Endocrinology   Standing Status: Future   Referral Priority: Routine Referral Type: Consultation   Requested Specialty: Endocrinology   Number of Visits Requested: 1      Diet Adult Regular     Lifting restrictions   Order Comments: No lifting more than 15 pounds for 6 weeks     No driving until:   Order Comments: - Not taking narcotic pain medications  - You feel that you can safely slam on the brakes     Pelvic Rest   Order Comments: Nothing in vagina until evaluation at postpartum visit (no sex, tampons, or douching)     No dressing needed     Notify your health care provider if you experience any of the following:  temperature >100.4     Notify your health care provider if you experience any of the following:  persistent nausea and vomiting or diarrhea     Notify your health care provider if you experience any of the following:  severe uncontrolled pain     Notify your health care provider if you experience any of the following:  redness, tenderness, or signs of infection (pain, swelling, redness, odor or green/yellow discharge around incision site)     Notify your health care provider if you experience any of the following:  difficulty breathing or increased cough     Notify your health care provider if you experience any of the following:  severe persistent headache     Notify your health care provider if you experience any of the following:  worsening rash     Notify your health care provider if you experience any of the following:  persistent dizziness, light-headedness, or visual disturbances     Notify your health care provider if you experience any of the following:  increased confusion or weakness     Notify your health care provider if you experience any of the following:   Order Comments: - Heavy vaginal bleeding soaking through more than 2 pads/hour for > 2 hours  - Severe abdominal pain  - Drainage from your incision  - Headache not relieved with Tylenol  - Vision changes  - Chest pain or shortness or breath     Activity as tolerated     Shower on day dressing removed (No bath)   Order Comments: Do not submerge your incision in a bathtub until evaluation at  postpartum visit        Follow-up Information       Uvaldo Wyatt MD Follow up in 1 week(s).    Specialty: Obstetrics and Gynecology  Why: 1 week BP check  Contact information:  [address] [id number] [phone]               Uvaldo Wyatt MD Follow up in 6 week(s).    Specialty: Obstetrics and Gynecology  Why: 6 week post partum visit  Contact information:  [address] [id number] [phone]                              Abi Timmons MD, MPH  OBGYN PGY-4

## 2024-10-03 NOTE — LACTATION NOTE
[employer] Maria M [phone] utilized. Pt has no questions at this time.  Lc number on white board. Pt aware to call for any assistance/assessment.

## 2024-10-03 NOTE — PLAN OF CARE
Problem:  Fall Injury Risk  Goal: Absence of Fall, Infant Drop and Related Injury  Outcome: Met     Problem: Adult Inpatient Plan of Care  Goal: Plan of Care Review  Outcome: Met  Goal: Patient-Specific Goal (Individualized)  Outcome: Met  Goal: Absence of Hospital-Acquired Illness or Injury  Outcome: Met  Goal: Optimal Comfort and Wellbeing  Outcome: Met  Goal: Readiness for Transition of Care  Outcome: Met     Problem: Diabetes Comorbidity  Goal: Blood Glucose Level Within Targeted Range  Outcome: Met     Problem: Pain Acute  Goal: Optimal Pain Control and Function  Outcome: Met     Problem:  Labor  Goal: Delayed  Delivery  Outcome: Met     Problem: Infection  Goal: Absence of Infection Signs and Symptoms  Outcome: Met     Problem: Labor  Goal: Effective Progression to Delivery  Outcome: Met     Problem: Anesthesia/Analgesia, Neuraxial  Goal: Baseline Motor Function Return  Outcome: Met  Goal: Effective Urinary Elimination  Outcome: Met     Problem: Fall Injury Risk  Goal: Absence of Fall and Fall-Related Injury  Outcome: Met     Problem: Skin Injury Risk Increased  Goal: Skin Health and Integrity  Outcome: Met     Problem: Breastfeeding  Goal: Effective Breastfeeding  Outcome: Met     Problem: Postpartum ( Delivery)  Goal: Successful Parent Role Transition  Outcome: Met  Goal: Hemostasis  Outcome: Met  Goal: Effective Bowel Elimination  Outcome: Met  Goal: Fluid and Electrolyte Balance  Outcome: Met  Goal: Absence of Infection Signs and Symptoms  Outcome: Met  Goal: Anesthesia/Sedation Recovery  Outcome: Met  Goal: Optimal Pain Control and Function  Outcome: Met  Goal: Nausea and Vomiting Relief  Outcome: Met  Goal: Effective Urinary Elimination  Outcome: Met  Goal: Effective Oxygenation and Ventilation  Outcome: Met     Problem: Wound  Goal: Optimal Coping  Outcome: Met  Goal: Optimal Functional Ability  Outcome: Met  Goal: Absence of Infection Signs and Symptoms  Outcome:  Met  Goal: Improved Oral Intake  Outcome: Met  Goal: Optimal Pain Control and Function  Outcome: Met  Goal: Skin Health and Integrity  Outcome: Met  Goal: Optimal Wound Healing  Outcome: Met

## 2024-10-04 ENCOUNTER — TELEPHONE (OUTPATIENT)
Dept: OBSTETRICS AND GYNECOLOGY | Facility: CLINIC | Age: 34
End: 2024-10-04
Payer: MEDICAID

## 2024-10-04 NOTE — TELEPHONE ENCOUNTER
Lvm to pt informing that her banadge removal appointment will be 10/7 at 2:45pm with dr rosales. If she need to reschedule to give us a call back.           ----- Message from Med Assistant Rodrigues sent at 10/3/2024  9:44 AM CDT -----    ----- Message -----  From: Jessica Ramon RN  Sent: 10/2/2024   5:30 PM CDT  To: Edmundo Bailon Staff    Hi, can you please call ms handy to schedule a post partum bp check appt on or before Wednesday 10/9, please remove  dressing at time of appt. Pt was asked to keep bg log to review  (will check bg 4x/day) thank you

## 2024-10-09 ENCOUNTER — POSTPARTUM VISIT (OUTPATIENT)
Dept: OBSTETRICS AND GYNECOLOGY | Facility: CLINIC | Age: 34
End: 2024-10-09
Payer: MEDICAID

## 2024-10-09 VITALS
BODY MASS INDEX: 27.31 KG/M2 | WEIGHT: 154.13 LBS | DIASTOLIC BLOOD PRESSURE: 80 MMHG | HEIGHT: 63 IN | SYSTOLIC BLOOD PRESSURE: 122 MMHG

## 2024-10-09 DIAGNOSIS — Z30.9 ENCOUNTER FOR CONTRACEPTIVE MANAGEMENT, UNSPECIFIED TYPE: ICD-10-CM

## 2024-10-09 PROCEDURE — 99213 OFFICE O/P EST LOW 20 MIN: CPT | Mod: PBBFAC,PO | Performed by: STUDENT IN AN ORGANIZED HEALTH CARE EDUCATION/TRAINING PROGRAM

## 2024-10-09 PROCEDURE — 99999 PR PBB SHADOW E&M-EST. PATIENT-LVL III: CPT | Mod: PBBFAC,,, | Performed by: STUDENT IN AN ORGANIZED HEALTH CARE EDUCATION/TRAINING PROGRAM

## 2024-10-09 RX ORDER — SULFAMETHOXAZOLE AND TRIMETHOPRIM 800; 160 MG/1; MG/1
1 TABLET ORAL 2 TIMES DAILY
Qty: 10 TABLET | Refills: 0 | Status: SHIPPED | OUTPATIENT
Start: 2024-10-09 | End: 2024-10-14

## 2024-10-09 NOTE — PROGRESS NOTES
History & Physical  Gynecology      SUBJECTIVE:     Chief Complaint: Postpartum Care       History of Present Illness:  Sri is now a 34 yr old  who presents to clinic for a postoperative exam following a 1LTCS on 24 following a failed IOL, 2/2 non-reassuring fetal status during  testing. This IUP was also complicated by GDM2. She is not currently checking her blood sugars and is not on any medications. She complains of some pain at the the site of her incision. She also complaints of constipation and requires the use of a lot of force to have bowel movement. Lochia is mild. Breast feeding. Denies postpartum blues. Tolerating a diet. Desires Nexplanon for contraception.     Review of patient's allergies indicates:  No Known Allergies    Past Medical History:   Diagnosis Date    Diabetes mellitus      Past Surgical History:   Procedure Laterality Date     SECTION N/A 2024    Procedure:  SECTION;  Surgeon: Kenyetta Paniagua MD;  Location: [facility] L&D;  Service: OB/GYN;  Laterality: N/A;     OB History          1    Para   1    Term           1    AB        Living   1         SAB        IAB        Ectopic        Multiple   0    Live Births   1               Family History   Problem Relation Name Age of Onset    Diabetes Paternal Grandmother      Hypertension Paternal Grandmother      Hypertension Father      Diabetes Father      Hypertension Mother      Diabetes Mother       Social History     Tobacco Use    Smoking status: Never    Smokeless tobacco: Never   Substance Use Topics    Alcohol use: Not Currently    Drug use: Never       Current Outpatient Medications   Medication Sig    acetaminophen (TYLENOL) 325 MG tablet Take 2 tablets (650 mg total) by mouth every 6 (six) hours.    blood-glucose sensor (DEXCOM G7 SENSOR) Rena 1 Device by Misc.(Non-Drug; Combo Route) route continuous.    docusate sodium (COLACE) 100 MG capsule Take 2 capsules (200 mg total)  "by mouth 2 (two) times daily.    famotidine (PEPCID) 40 MG tablet Take 1 tablet (40 mg total) by mouth every evening.    ferrous sulfate 325 (65 FE) MG EC tablet Take 1 tablet (325 mg total) by mouth once daily.    ibuprofen (ADVIL,MOTRIN) 600 MG tablet Take 1 tablet (600 mg total) by mouth every 6 (six) hours. Alternate between ibuprofen and tylenol every 3 hours. For example: @0800: ibuprofen 600mg @1100: tylenol 650mg @1400: ibuprofen 600mg @1700: tylenol 650 mg @2000: ibuprofen 600mg    metoclopramide HCl (REGLAN) 10 MG tablet Take 1 tablet (10 mg total) by mouth every 6 (six) hours as needed (nausea and vomiting).    multivit with minerals/lutein (MULTIVITAMIN 50 PLUS ORAL) Take by mouth.    oxyCODONE-acetaminophen (PERCOCET) 5-325 mg per tablet Take 1 tablet by mouth every 4 (four) hours as needed for Pain.    pen needle, diabetic 32 gauge x 5/32" Ndle 1 Syringe by Misc.(Non-Drug; Combo Route) route 4 (four) times daily.    polyethylene glycol (GLYCOLAX) 17 gram PwPk Take 17 g by mouth once daily.    senna (SENOKOT) 8.6 mg tablet Take 1 tablet by mouth once daily.    TRUE METRIX GLUCOSE METER Misc use TO test blood glucose FOUR TIMES DAILY    TRUE METRIX GLUCOSE TEST STRIP Strp Inject 1 strip into the skin 4 (four) times daily.    TRUEPLUS LANCETS 33 gauge Misc Inject 1 lancet  into the skin 4 (four) times daily.    furosemide (LASIX) 20 MG tablet Take 1 tablet (20 mg total) by mouth once daily. for 4 days    sulfamethoxazole-trimethoprim 800-160mg (BACTRIM DS) 800-160 mg Tab Take 1 tablet by mouth 2 (two) times daily. for 5 days     No current facility-administered medications for this visit.       Review of Systems:  Review of Systems   Constitutional:  Negative for chills, fatigue and fever.   HENT:  Negative for congestion.    Eyes:  Negative for visual disturbance.   Respiratory:  Negative for cough and shortness of breath.    Cardiovascular:  Negative for chest pain and palpitations.   Gastrointestinal:  " Negative for abdominal distention, abdominal pain, constipation, diarrhea, nausea and vomiting.   Genitourinary:  Negative for difficulty urinating, dysuria, hematuria, vaginal bleeding and vaginal discharge.   Skin:  Negative for rash.   Neurological:  Negative for dizziness, seizures, light-headedness and headaches.   Hematological:  Does not bruise/bleed easily.   Psychiatric/Behavioral:  Negative for dysphoric mood. The patient is not nervous/anxious.         OBJECTIVE:     Physical Exam:  Vitals:    10/09/24 1533   BP: 122/80      Physical Exam  Vitals and nursing note reviewed.   Constitutional:       General: She is not in acute distress.     Appearance: She is well-developed.   HENT:      Head: Normocephalic and atraumatic.   Eyes:      Pupils: Pupils are equal, round, and reactive to light.   Cardiovascular:      Rate and Rhythm: Normal rate and regular rhythm.   Pulmonary:      Effort: Pulmonary effort is normal. No respiratory distress.   Abdominal:      General: There is no distension.      Palpations: Abdomen is soft. There is no mass.      Tenderness: There is no abdominal tenderness. There is no guarding.       Musculoskeletal:         General: Normal range of motion.      Cervical back: Normal range of motion and neck supple.   Skin:     General: Skin is warm and dry.   Neurological:      Mental Status: She is alert and oriented to person, place, and time.   Psychiatric:         Behavior: Behavior normal.         Thought Content: Thought content normal.         Judgment: Judgment normal.         ASSESSMENT/PLAN:     1. Postpartum exam (Primary)  - Patient meeting appropriate postoperative milestones  - Precautions given  - RTC for PP visit    2. Encounter for contraceptive management, unspecified type  - Nexplanon ordered.   - Placement scheduled for next week  - Device Authorization Order    3. DM2  - Encouraged patient to continue blood glucose logs  - Will consider re-starting Metformin with poor  control  - Endocrine consult    Uvaldo Wyatt M.D.  OB/GYN  [facility]

## 2024-10-10 LAB
ALLENS TEST: ABNORMAL
HCO3 UR-SCNC: 17.6 MMOL/L (ref 12–29)
PCO2 BLDA: 39.8 MMHG (ref 27–49)
PH SMN: 7.25 [PH] (ref 7.25–7.45)
PO2 BLDA: 25 MMHG (ref 17–41)
POC BE: -10 MMOL/L
POC SATURATED O2: 36 %
POC TCO2: 19 MMOL/L (ref 23–27)
SAMPLE: ABNORMAL
SITE: ABNORMAL

## 2024-10-15 ENCOUNTER — PROCEDURE VISIT (OUTPATIENT)
Dept: OBSTETRICS AND GYNECOLOGY | Facility: CLINIC | Age: 34
End: 2024-10-15
Payer: MEDICAID

## 2024-10-15 ENCOUNTER — TELEPHONE (OUTPATIENT)
Dept: OBSTETRICS AND GYNECOLOGY | Facility: CLINIC | Age: 34
End: 2024-10-15

## 2024-10-15 VITALS
WEIGHT: 150.81 LBS | SYSTOLIC BLOOD PRESSURE: 103 MMHG | DIASTOLIC BLOOD PRESSURE: 74 MMHG | BODY MASS INDEX: 26.72 KG/M2 | HEIGHT: 63 IN

## 2024-10-15 DIAGNOSIS — G89.18 POSTOPERATIVE PAIN: ICD-10-CM

## 2024-10-15 DIAGNOSIS — E11.69 TYPE 2 DIABETES MELLITUS WITH OTHER SPECIFIED COMPLICATION, UNSPECIFIED WHETHER LONG TERM INSULIN USE: ICD-10-CM

## 2024-10-15 DIAGNOSIS — Z30.017 NEXPLANON INSERTION: Primary | ICD-10-CM

## 2024-10-15 PROCEDURE — 11981 INSERTION DRUG DLVR IMPLANT: CPT | Mod: PBBFAC,PO | Performed by: STUDENT IN AN ORGANIZED HEALTH CARE EDUCATION/TRAINING PROGRAM

## 2024-10-15 PROCEDURE — 99999PBSHW PR PBB SHADOW TECHNICAL ONLY FILED TO HB: Mod: PBBFAC,,,

## 2024-10-15 RX ORDER — METFORMIN HYDROCHLORIDE 500 MG/1
500 TABLET ORAL 2 TIMES DAILY WITH MEALS
Qty: 180 TABLET | Refills: 3 | Status: SHIPPED | OUTPATIENT
Start: 2024-10-15 | End: 2024-10-15

## 2024-10-15 RX ORDER — OXYCODONE AND ACETAMINOPHEN 5; 325 MG/1; MG/1
1 TABLET ORAL EVERY 4 HOURS PRN
Qty: 10 TABLET | Refills: 0 | Status: SHIPPED | OUTPATIENT
Start: 2024-10-15 | End: 2024-10-15

## 2024-10-15 RX ORDER — OXYCODONE AND ACETAMINOPHEN 5; 325 MG/1; MG/1
1 TABLET ORAL EVERY 4 HOURS PRN
Qty: 10 TABLET | Refills: 0 | Status: SHIPPED | OUTPATIENT
Start: 2024-10-15

## 2024-10-15 RX ORDER — METFORMIN HYDROCHLORIDE 500 MG/1
500 TABLET ORAL 2 TIMES DAILY WITH MEALS
Qty: 180 TABLET | Refills: 3 | Status: SHIPPED | OUTPATIENT
Start: 2024-10-15 | End: 2025-10-15

## 2024-10-15 RX ORDER — AMOXICILLIN AND CLAVULANATE POTASSIUM 875; 125 MG/1; MG/1
1 TABLET, FILM COATED ORAL EVERY 12 HOURS
Qty: 10 TABLET | Refills: 0 | Status: SHIPPED | OUTPATIENT
Start: 2024-10-15 | End: 2024-10-15

## 2024-10-15 RX ORDER — AMOXICILLIN AND CLAVULANATE POTASSIUM 875; 125 MG/1; MG/1
1 TABLET, FILM COATED ORAL EVERY 12 HOURS
Qty: 10 TABLET | Refills: 0 | Status: SHIPPED | OUTPATIENT
Start: 2024-10-15 | End: 2024-10-20

## 2024-10-15 RX ADMIN — ETONOGESTREL 68 MG: 68 IMPLANT SUBCUTANEOUS at 10:10

## 2024-10-15 NOTE — PROCEDURES
Insertion of Nexplanon    Date/Time: 10/15/2024 10:45 AM    Performed by: Uvaldo Wyatt MD  Authorized by: Uvaldo Wyatt MD    Consent:   Consent given by:  Patient  Patient questions answered: yes    Patient agrees, verbalizes understanding, and wants to proceed: yes    Instructions and paperwork completed: yes    Pre-Procedure:   Local anesthetic:  Lidocaine with epinephrine  The site was cleaned and prepped in a sterile fashion: yes    Insertion Procedure:   Left/right:  left arm   68 mg etonogestreL 68 mg  Preloaded Implanon trocar was placed subdermally: yes    Visualization of implant was obtained: yes    Nexplanon was inserted and trocar removed: yes    Visualization of notch in stilette and palpitation of device: yes    Palpation confirms placement by provider and patient: yes    Site was closed with steri-strips and pressure bandage applied: yes

## 2024-10-15 NOTE — TELEPHONE ENCOUNTER
----- Message from Kanika sent at 10/15/2024 11:45 AM CDT -----  Narinder calling from [facility] pharmacy he need DX code for her pain medication  he can be reached at [phone]

## 2024-10-15 NOTE — PROCEDURES
"History & Physical  Gynecology      SUBJECTIVE:     Chief Complaint: Postpartum Exam/Nexplanon Insertion       History of Present Illness:  Sri is now a 34 yr old  who presents to clinic for a postoperative exam following a 1LTCS (at [facility], [place]) on 24 following a failed IOL, 2/2 non-reassuring fetal status during  testing. This IUP was also complicated by GDM2. Patient states that she is now checking her sugars and has had some elevated sugars at home. She continues to have some incisional pain, wasn't able to take the previously prescribed antibiotics because "the caused her harm." Also states she's not taking Ibuprofen because "it doesn't work." Her constipation is better with Miralax She is concerned about some continued bleeding. No longer breast-feeding. Denies postpartum blues.       Review of patient's allergies indicates:  No Known Allergies    Past Medical History:   Diagnosis Date    Diabetes mellitus      Past Surgical History:   Procedure Laterality Date     SECTION N/A 2024    Procedure:  SECTION;  Surgeon: Kenyetta Paniagua MD;  Location: [facility] L&D;  Service: OB/GYN;  Laterality: N/A;     OB History          1    Para   1    Term           1    AB        Living   1         SAB        IAB        Ectopic        Multiple   0    Live Births   1               Family History   Problem Relation Name Age of Onset    Diabetes Paternal Grandmother      Hypertension Paternal Grandmother      Hypertension Father      Diabetes Father      Hypertension Mother      Diabetes Mother       Social History     Tobacco Use    Smoking status: Never    Smokeless tobacco: Never   Substance Use Topics    Alcohol use: Not Currently    Drug use: Never       Current Outpatient Medications   Medication Sig    ibuprofen (ADVIL,MOTRIN) 600 MG tablet Take 1 tablet (600 mg total) by mouth every 6 (six) hours. Alternate between ibuprofen and tylenol every 3 hours. For " "example: @0800: ibuprofen 600mg @1100: tylenol 650mg @1400: ibuprofen 600mg @1700: tylenol 650 mg @2000: ibuprofen 600mg    oxyCODONE-acetaminophen (PERCOCET) 5-325 mg per tablet Take 1 tablet by mouth every 4 (four) hours as needed for Pain.    acetaminophen (TYLENOL) 325 MG tablet Take 2 tablets (650 mg total) by mouth every 6 (six) hours. (Patient not taking: Reported on 10/15/2024)    amoxicillin-clavulanate 875-125mg (AUGMENTIN) 875-125 mg per tablet Take 1 tablet by mouth every 12 (twelve) hours. for 5 days    blood-glucose sensor (DEXCOM G7 SENSOR) Rena 1 Device by Misc.(Non-Drug; Combo Route) route continuous. (Patient not taking: Reported on 10/15/2024)    docusate sodium (COLACE) 100 MG capsule Take 2 capsules (200 mg total) by mouth 2 (two) times daily. (Patient not taking: Reported on 10/15/2024)    famotidine (PEPCID) 40 MG tablet Take 1 tablet (40 mg total) by mouth every evening. (Patient not taking: Reported on 10/15/2024)    ferrous sulfate 325 (65 FE) MG EC tablet Take 1 tablet (325 mg total) by mouth once daily. (Patient not taking: Reported on 10/15/2024)    furosemide (LASIX) 20 MG tablet Take 1 tablet (20 mg total) by mouth once daily. for 4 days    metFORMIN (GLUCOPHAGE) 500 MG tablet Take 1 tablet (500 mg total) by mouth 2 (two) times daily with meals.    metoclopramide HCl (REGLAN) 10 MG tablet Take 1 tablet (10 mg total) by mouth every 6 (six) hours as needed (nausea and vomiting). (Patient not taking: Reported on 10/15/2024)    multivit with minerals/lutein (MULTIVITAMIN 50 PLUS ORAL) Take by mouth. (Patient not taking: Reported on 10/15/2024)    pen needle, diabetic 32 gauge x 5/32" Ndle 1 Syringe by Misc.(Non-Drug; Combo Route) route 4 (four) times daily. (Patient not taking: Reported on 10/15/2024)    polyethylene glycol (GLYCOLAX) 17 gram PwPk Take 17 g by mouth once daily. (Patient not taking: Reported on 10/15/2024)    senna (SENOKOT) 8.6 mg tablet Take 1 tablet by mouth once daily. " (Patient not taking: Reported on 10/15/2024)    TRUE METRIX GLUCOSE METER Misc use TO test blood glucose FOUR TIMES DAILY (Patient not taking: Reported on 10/15/2024)    TRUE METRIX GLUCOSE TEST STRIP Strp Inject 1 strip into the skin 4 (four) times daily. (Patient not taking: Reported on 10/15/2024)    TRUEPLUS LANCETS 33 gauge Misc Inject 1 lancet  into the skin 4 (four) times daily. (Patient not taking: Reported on 10/15/2024)     Current Facility-Administered Medications   Medication    etonogestreL 68 mg intradermal implant 68 mg       Review of Systems:  Review of Systems   Constitutional:  Negative for chills, fatigue and fever.   HENT:  Negative for congestion.    Eyes:  Negative for visual disturbance.   Respiratory:  Negative for cough and shortness of breath.    Cardiovascular:  Negative for chest pain and palpitations.   Gastrointestinal:  Positive for abdominal pain (Incisional pain.). Negative for abdominal distention, constipation, diarrhea, nausea and vomiting.   Genitourinary:  Negative for difficulty urinating, dysuria, hematuria, vaginal bleeding and vaginal discharge.   Skin:  Negative for rash.   Neurological:  Negative for dizziness, seizures, light-headedness and headaches.   Hematological:  Does not bruise/bleed easily.   Psychiatric/Behavioral:  Negative for dysphoric mood. The patient is not nervous/anxious.         OBJECTIVE:     Physical Exam:  Vitals:    10/15/24 1053   BP: 103/74      Physical Exam  Vitals and nursing note reviewed. Exam conducted with a chaperone present.   Constitutional:       General: She is not in acute distress.     Appearance: She is well-developed.   HENT:      Head: Normocephalic and atraumatic.   Eyes:      Pupils: Pupils are equal, round, and reactive to light.   Cardiovascular:      Rate and Rhythm: Normal rate and regular rhythm.   Pulmonary:      Effort: Pulmonary effort is normal. No respiratory distress.   Abdominal:      General: There is no distension.       Palpations: Abdomen is soft. There is no mass.      Tenderness: There is no abdominal tenderness. There is no guarding.       Musculoskeletal:         General: Normal range of motion.      Cervical back: Normal range of motion and neck supple.   Skin:     General: Skin is warm and dry.   Neurological:      Mental Status: She is alert and oriented to person, place, and time.   Psychiatric:         Behavior: Behavior normal.         Thought Content: Thought content normal.         Judgment: Judgment normal.         ASSESSMENT/PLAN:     1. Nexplanon insertion (Primary)  - Insertion of Nexplanon  - etonogestreL 68 mg intradermal implant 68 mg    2. Postpartum exam  - Patient meeting appropriate postpartum   - Stressed importance of multi-modal pain management with NSAIDS. Short course of additional Percocet given    3. Type 2 diabetes mellitus with other specified complication, unspecified whether long term insulin use  - Will restart Metformin. Previously on Metformin 850 prior to pregnancy  - Stressed importance of establishing care with PCP/Endocrine  - Ambulatory referral/consult to Family Practice; Future    Uvaldo Wyatt M.D.  OB/GYN  [facility]

## 2024-10-30 ENCOUNTER — OFFICE VISIT (OUTPATIENT)
Dept: FAMILY MEDICINE | Facility: HOSPITAL | Age: 34
End: 2024-10-30
Payer: MEDICAID

## 2024-10-30 VITALS
DIASTOLIC BLOOD PRESSURE: 75 MMHG | WEIGHT: 151.25 LBS | BODY MASS INDEX: 26.8 KG/M2 | HEIGHT: 63 IN | RESPIRATION RATE: 16 BRPM | HEART RATE: 91 BPM | SYSTOLIC BLOOD PRESSURE: 109 MMHG | OXYGEN SATURATION: 98 %

## 2024-10-30 DIAGNOSIS — Z98.891 STATUS POST PRIMARY LOW TRANSVERSE CESAREAN SECTION: ICD-10-CM

## 2024-10-30 DIAGNOSIS — E11.69 TYPE 2 DIABETES MELLITUS WITH OTHER SPECIFIED COMPLICATION, UNSPECIFIED WHETHER LONG TERM INSULIN USE: Primary | ICD-10-CM

## 2024-10-30 DIAGNOSIS — R30.0 DYSURIA: ICD-10-CM

## 2024-10-30 PROCEDURE — 99214 OFFICE O/P EST MOD 30 MIN: CPT

## 2024-10-31 DIAGNOSIS — E11.69 TYPE 2 DIABETES MELLITUS WITH OTHER SPECIFIED COMPLICATION, UNSPECIFIED WHETHER LONG TERM INSULIN USE: ICD-10-CM

## 2024-11-01 NOTE — TELEPHONE ENCOUNTER
Refill Routing Note   Medication(s) are not appropriate for processing by [facility] for the following reason(s):        New or recently adjusted medication  Should have numerous refills remaining    [facility] action(s):  Defer        Medication Therapy Plan: Recently restarted; REcently pregnant, breastfeeding. Patient should have a year of refills remaining on last order      Appointments  past 12m or future 3m with PCP    Date Provider   Last Visit   10/15/2024 Uvaldo Wyatt MD   Next Visit   Visit date not found Uvaldo Wyatt MD   ED visits in past 90 days: 1        Note composed:6:48 AM 11/01/2024

## 2024-11-02 NOTE — PROGRESS NOTES
"[facility]  History & Physical    SUBJECTIVE:     Chief Complaint:   Chief Complaint   Patient presents with    Diabetes       History of Present Illness:  34 y.o. female who  has a past medical history of Diabetes mellitus. presents to clinic today for follow up on diabetes. Patient currently 1 month postpartum and is currently on Metformin 500 mg BID for management of her diabetes.  Most recent A1c 4 months ago was 6.2. Patient currently requesting medication refills/labs prior insurance ending on .    #Post partum  Patient currently one month post partum after delivery of infant via . Follows up with outpatient OBGYN. Denies any fever, chills, nausea or vomiting. Denies any lesions, erythema or drainage for  incision. Reports pulling sensation with lifting objects particularly  infant. Has been taking Ibuprophen q6h for pain. Reports some GI discomfort with medication.      #Dysuria  Endorses burning with urination for the past 4 days. Denies any vaginal discharge, or increased urinary frequency.      Allergies:  Review of patient's allergies indicates:  No Known Allergies    Home Medications:  Current Outpatient Medications on File Prior to Visit   Medication Sig    acetaminophen (TYLENOL) 325 MG tablet Take 2 tablets (650 mg total) by mouth every 6 (six) hours.    blood-glucose sensor (DEXCOM G7 SENSOR) Rena 1 Device by Misc.(Non-Drug; Combo Route) route continuous.    famotidine (PEPCID) 40 MG tablet Take 1 tablet (40 mg total) by mouth every evening.    ferrous sulfate 325 (65 FE) MG EC tablet Take 1 tablet (325 mg total) by mouth once daily.    metFORMIN (GLUCOPHAGE) 500 MG tablet Take 1 tablet (500 mg total) by mouth 2 (two) times daily with meals.    multivit with minerals/lutein (MULTIVITAMIN 50 PLUS ORAL) Take by mouth.    pen needle, diabetic 32 gauge x 5/32" Ndle 1 Syringe by Misc.(Non-Drug; Combo Route) route 4 (four) times daily.    TRUE METRIX GLUCOSE " METER Misc     TRUE METRIX GLUCOSE TEST STRIP Strp Inject 1 strip into the skin 4 (four) times daily.    TRUEPLUS LANCETS 33 gauge Misc Inject 1 lancet  into the skin 4 (four) times daily.     Current Facility-Administered Medications on File Prior to Visit   Medication    etonogestreL 68 mg intradermal implant 68 mg       Past Medical History:   Diagnosis Date    Diabetes mellitus      Past Surgical History:   Procedure Laterality Date     SECTION N/A 2024    Procedure:  SECTION;  Surgeon: Kenyetta Paniagua MD;  Location: [facility] L&D;  Service: OB/GYN;  Laterality: N/A;     Family History   Problem Relation Name Age of Onset    Diabetes Paternal Grandmother      Hypertension Paternal Grandmother      Hypertension Father      Diabetes Father      Hypertension Mother      Diabetes Mother       Social History     Tobacco Use    Smoking status: Never    Smokeless tobacco: Never   Substance Use Topics    Alcohol use: Not Currently    Drug use: Never        Review of Systems   Constitutional:  Negative for chills and fever.   HENT:  Negative for congestion and sore throat.    Eyes:  Negative for blurred vision.   Respiratory:  Negative for cough, shortness of breath and wheezing.    Cardiovascular:  Negative for chest pain and leg swelling.   Gastrointestinal:  Negative for abdominal pain, nausea and vomiting.   Genitourinary:  Negative for dysuria.   Musculoskeletal:  Negative for joint pain and myalgias.   Skin:  Negative for rash.   Neurological:  Negative for dizziness and headaches.        OBJECTIVE:     Vital Signs:  Pulse: 91 (10/30/24 1458)  Resp: 16 (10/30/24 1458)  BP: 109/75 (10/30/24 1458)  SpO2: 98 % (10/30/24 1458)    Physical Exam  Constitutional:       Appearance: Normal appearance.   HENT:      Head: Normocephalic and atraumatic.      Mouth/Throat:      Pharynx: Oropharynx is clear.   Eyes:      Extraocular Movements: Extraocular movements intact.      Pupils: Pupils are equal,  round, and reactive to light.   Cardiovascular:      Rate and Rhythm: Normal rate and regular rhythm.      Pulses: Normal pulses.      Heart sounds: Normal heart sounds.   Pulmonary:      Effort: Pulmonary effort is normal.      Breath sounds: Normal breath sounds.   Abdominal:      General: Abdomen is flat. Bowel sounds are normal.      Palpations: Abdomen is soft.   Musculoskeletal:         General: Normal range of motion.      Cervical back: Normal range of motion and neck supple.   Skin:     General: Skin is warm and dry.      Comments:  incision well healing, clean, dry and intact. No surrounding erythema or drainage noted.   Neurological:      General: No focal deficit present.      Mental Status: She is alert and oriented to person, place, and time.   Psychiatric:         Mood and Affect: Mood normal.         Behavior: Behavior normal.       Laboratory:  Hemoglobin A1C   Date Value Ref Range Status   10/30/2024 6.3 (H) 4.0 - 5.6 % Final     Comment:     ADA Screening Guidelines:  5.7-6.4%  Consistent with prediabetes  >or=6.5%  Consistent with diabetes    High levels of fetal hemoglobin interfere with the HbA1C  assay. Heterozygous hemoglobin variants (HbS, HgC, etc)do  not significantly interfere with this assay.   However, presence of multiple variants may affect accuracy.     2024 6.2 (H) 4.0 - 5.6 % Final     Comment:     ADA Screening Guidelines:  5.7-6.4%  Consistent with prediabetes  >or=6.5%  Consistent with diabetes    High levels of fetal hemoglobin interfere with the HbA1C  assay. Heterozygous hemoglobin variants (HbS, HgC, etc)do  not significantly interfere with this assay.   However, presence of multiple variants may affect accuracy.     2024 9.6 (H) 4.0 - 5.6 % Final     Comment:     ADA Screening Guidelines:  5.7-6.4%  Consistent with prediabetes  >or=6.5%  Consistent with diabetes    High levels of fetal hemoglobin interfere with the HbA1C  assay. Heterozygous hemoglobin  variants (HbS, HgC, etc)do  not significantly interfere with this assay.   However, presence of multiple variants may affect accuracy.         A/P:  Sri was seen today for diabetes.    Diagnoses and all orders for this visit:    Type 2 diabetes mellitus with other specified complication, unspecified whether long term insulin use  -Most recent A1c reviewed.  We will order repeat testing today.  Continue current regimen for now and will adjust as needed.  -Discussed long-term A1C goal of <7.0, AM blood sugar goal of  and postprandial goal of < 180 two hours after biggest meal.    Annual eye examinations at Ophthalmology discussed.  Patient understands, and all questions were answered.   -     Ambulatory referral/consult to Family Practice  -     Hemoglobin A1C; Future  -     Microalbumin/Creatinine Ratio, Urine; Future    Dysuria  -     Urinalysis, Reflex to Urine Culture Urine, Clean Catch; Future  -Plan to send for culture.  Will treat based on results.    BMI 26.0-26.9,adult  -     Lipid Panel; Future  - Counseled patient on the importance maintaining a balanced healthy diet including but not limited to the following  -At least one-half of food eaten should be whole fruits and vegetables with the core elements of the other half of food  should include grains, whole grains, dairy, protein, and oils with lower saturated fat  Recommend minimizing alcohol use, if patient consumes alcoholic beverages and consumption of foods with added sugar, saturated fat, and sodium.    - Counseled patient on importance of exercising at least 150 minutes of weekly exercise      Status post primary low transverse  section        -C-sections scar healing well.  Incision clean, dry and intact.  Recommend patient do not lift objects greater than the weight of  infant for this period of time while patient recovering from  section.  Recommend Tylenol as needed for management of pain symptoms in light of GI  discomfort with ibuprofen.  Follow up with outpatient OB as scheduled.         -EPDS done today.  Results negative for postpartum depression.  PHQ-9: 2 and GERALDINE-7: 0.  Denies any suicidal ideation, homicidal ideation or auditory/visual hallucinations.        No follow-ups on file.        Catherine Angela MD  [facility], PGY-3  11/02/2024

## 2024-11-07 NOTE — PROGRESS NOTES
I assume primary medical responsibility for this patient. I have reviewed the history, physical, and assessement & treatment plan with the resident and agree that the care is reasonable and necessary. This service has been performed by a resident without the presence of a teaching physician under the primary care exception. If necessary, an addendum of additional findings or evaluation beyond the resident documentation will be noted below.      Georgia Perales MD    [facility]

## 2024-12-30 RX ORDER — METFORMIN HYDROCHLORIDE 500 MG/1
500 TABLET ORAL 2 TIMES DAILY WITH MEALS
Qty: 180 TABLET | Refills: 3 | OUTPATIENT
Start: 2024-12-30

## 2024-12-30 NOTE — TELEPHONE ENCOUNTER
Provider Staff:  Please note Refusal of medication.     Action required for this patient.      Requested Prescriptions     Refused Prescriptions Disp Refills    metFORMIN (GLUCOPHAGE) 500 MG tablet 180 tablet 3     Sig: Take 1 tablet (500 mg total) by mouth 2 (two) times daily with meals.     Refused By: GERONIMO CORONADO A     Reason for Refusal: Other (See comments)      Thanks!  [facility]   Note composed: 12/30/2024 8:33 AM         OBGYN noted that patient needs to now get from PCP

## 2025-02-20 ENCOUNTER — RESULTS FOLLOW-UP (OUTPATIENT)
Dept: FAMILY MEDICINE | Facility: HOSPITAL | Age: 35
End: 2025-02-20